# Patient Record
Sex: FEMALE | Race: WHITE | Employment: OTHER | URBAN - METROPOLITAN AREA
[De-identification: names, ages, dates, MRNs, and addresses within clinical notes are randomized per-mention and may not be internally consistent; named-entity substitution may affect disease eponyms.]

---

## 2017-01-29 ENCOUNTER — HOSPITAL ENCOUNTER (EMERGENCY)
Age: 52
Discharge: HOME | End: 2017-01-29
Attending: EMERGENCY MEDICINE
Payer: MEDICARE

## 2017-01-29 ENCOUNTER — APPOINTMENT (OUTPATIENT)
Dept: GENERAL RADIOLOGY | Age: 52
End: 2017-01-29
Attending: NURSE PRACTITIONER
Payer: MEDICARE

## 2017-01-29 VITALS
TEMPERATURE: 98.5 F | HEART RATE: 95 BPM | DIASTOLIC BLOOD PRESSURE: 87 MMHG | RESPIRATION RATE: 16 BRPM | WEIGHT: 230 LBS | OXYGEN SATURATION: 93 % | BODY MASS INDEX: 33.97 KG/M2 | SYSTOLIC BLOOD PRESSURE: 155 MMHG

## 2017-01-29 DIAGNOSIS — J20.9 ACUTE BRONCHITIS, UNSPECIFIED ORGANISM: Primary | ICD-10-CM

## 2017-01-29 LAB
ALBUMIN SERPL BCP-MCNC: 4.6 G/DL (ref 3.5–5)
ALBUMIN/GLOB SERPL: 1 {RATIO} (ref 1.2–3.5)
ALP SERPL-CCNC: 50 U/L (ref 50–136)
ALT SERPL-CCNC: 67 U/L (ref 12–65)
AMPHET UR QL SCN: NEGATIVE
ANION GAP BLD CALC-SCNC: 9 MMOL/L (ref 7–16)
AST SERPL W P-5'-P-CCNC: 46 U/L (ref 15–37)
BARBITURATES UR QL SCN: NEGATIVE
BASOPHILS # BLD AUTO: 0 K/UL (ref 0–0.2)
BASOPHILS # BLD: 0 % (ref 0–2)
BENZODIAZ UR QL: NEGATIVE
BILIRUB SERPL-MCNC: 0.4 MG/DL (ref 0.2–1.1)
BNP SERPL-MCNC: 25 PG/ML
BUN SERPL-MCNC: 8 MG/DL (ref 6–23)
CALCIUM SERPL-MCNC: 9.5 MG/DL (ref 8.3–10.4)
CANNABINOIDS UR QL SCN: NEGATIVE
CHLORIDE SERPL-SCNC: 98 MMOL/L (ref 98–107)
CO2 SERPL-SCNC: 30 MMOL/L (ref 21–32)
COCAINE UR QL SCN: NEGATIVE
CREAT SERPL-MCNC: 0.85 MG/DL (ref 0.6–1)
CRP SERPL-MCNC: 3.5 MG/DL (ref 0–0.9)
DIFFERENTIAL METHOD BLD: ABNORMAL
EOSINOPHIL # BLD: 0.1 K/UL (ref 0–0.8)
EOSINOPHIL NFR BLD: 1 % (ref 0.5–7.8)
ERYTHROCYTE [DISTWIDTH] IN BLOOD BY AUTOMATED COUNT: 13.4 % (ref 11.9–14.6)
GLOBULIN SER CALC-MCNC: 4.6 G/DL (ref 2.3–3.5)
GLUCOSE SERPL-MCNC: 95 MG/DL (ref 65–100)
HCG UR QL: NEGATIVE
HCT VFR BLD AUTO: 44.3 % (ref 35.8–46.3)
HGB BLD-MCNC: 14.8 G/DL (ref 11.7–15.4)
IMM GRANULOCYTES # BLD: 0.1 K/UL (ref 0–0.5)
IMM GRANULOCYTES NFR BLD AUTO: 0.3 % (ref 0–5)
LYMPHOCYTES # BLD AUTO: 12 % (ref 13–44)
LYMPHOCYTES # BLD: 2.3 K/UL (ref 0.5–4.6)
MCH RBC QN AUTO: 29.8 PG (ref 26.1–32.9)
MCHC RBC AUTO-ENTMCNC: 33.4 G/DL (ref 31.4–35)
MCV RBC AUTO: 89.3 FL (ref 79.6–97.8)
METHADONE UR QL: NEGATIVE
MONOCYTES # BLD: 1 K/UL (ref 0.1–1.3)
MONOCYTES NFR BLD AUTO: 5 % (ref 4–12)
NEUTS SEG # BLD: 15.3 K/UL (ref 1.7–8.2)
NEUTS SEG NFR BLD AUTO: 82 % (ref 43–78)
OPIATES UR QL: POSITIVE
PCP UR QL: NEGATIVE
PLATELET # BLD AUTO: 289 K/UL (ref 150–450)
PMV BLD AUTO: 11.1 FL (ref 10.8–14.1)
POTASSIUM SERPL-SCNC: 4.2 MMOL/L (ref 3.5–5.1)
PROT SERPL-MCNC: 9.2 G/DL (ref 6.3–8.2)
RBC # BLD AUTO: 4.96 M/UL (ref 4.05–5.25)
SODIUM SERPL-SCNC: 137 MMOL/L (ref 136–145)
TROPONIN I SERPL-MCNC: <0.04 NG/ML (ref 0.02–0.05)
WBC # BLD AUTO: 18.8 K/UL (ref 4.3–11.1)

## 2017-01-29 PROCEDURE — 86140 C-REACTIVE PROTEIN: CPT | Performed by: NURSE PRACTITIONER

## 2017-01-29 PROCEDURE — 96361 HYDRATE IV INFUSION ADD-ON: CPT | Performed by: NURSE PRACTITIONER

## 2017-01-29 PROCEDURE — 71020 XR CHEST PA LAT: CPT

## 2017-01-29 PROCEDURE — 96374 THER/PROPH/DIAG INJ IV PUSH: CPT | Performed by: NURSE PRACTITIONER

## 2017-01-29 PROCEDURE — 80053 COMPREHEN METABOLIC PANEL: CPT | Performed by: NURSE PRACTITIONER

## 2017-01-29 PROCEDURE — 77030020120 HC VLV RESP PEP HI -B

## 2017-01-29 PROCEDURE — 80307 DRUG TEST PRSMV CHEM ANLYZR: CPT | Performed by: NURSE PRACTITIONER

## 2017-01-29 PROCEDURE — 99285 EMERGENCY DEPT VISIT HI MDM: CPT | Performed by: NURSE PRACTITIONER

## 2017-01-29 PROCEDURE — 81025 URINE PREGNANCY TEST: CPT

## 2017-01-29 PROCEDURE — 93005 ELECTROCARDIOGRAM TRACING: CPT | Performed by: NURSE PRACTITIONER

## 2017-01-29 PROCEDURE — 94640 AIRWAY INHALATION TREATMENT: CPT

## 2017-01-29 PROCEDURE — 74011250636 HC RX REV CODE- 250/636: Performed by: NURSE PRACTITIONER

## 2017-01-29 PROCEDURE — 84484 ASSAY OF TROPONIN QUANT: CPT | Performed by: NURSE PRACTITIONER

## 2017-01-29 PROCEDURE — 83880 ASSAY OF NATRIURETIC PEPTIDE: CPT | Performed by: NURSE PRACTITIONER

## 2017-01-29 PROCEDURE — 81003 URINALYSIS AUTO W/O SCOPE: CPT | Performed by: NURSE PRACTITIONER

## 2017-01-29 PROCEDURE — 74011000250 HC RX REV CODE- 250: Performed by: NURSE PRACTITIONER

## 2017-01-29 PROCEDURE — 74011250637 HC RX REV CODE- 250/637: Performed by: NURSE PRACTITIONER

## 2017-01-29 PROCEDURE — 94664 DEMO&/EVAL PT USE INHALER: CPT

## 2017-01-29 PROCEDURE — 85025 COMPLETE CBC W/AUTO DIFF WBC: CPT | Performed by: NURSE PRACTITIONER

## 2017-01-29 RX ORDER — HYDROCODONE BITARTRATE AND ACETAMINOPHEN 5; 325 MG/1; MG/1
1 TABLET ORAL ONCE
Status: COMPLETED | OUTPATIENT
Start: 2017-01-29 | End: 2017-01-29

## 2017-01-29 RX ORDER — DOXYCYCLINE HYCLATE 100 MG
100 TABLET ORAL 2 TIMES DAILY
Qty: 14 TAB | Refills: 0 | Status: SHIPPED | OUTPATIENT
Start: 2017-01-29 | End: 2017-02-05

## 2017-01-29 RX ORDER — IPRATROPIUM BROMIDE AND ALBUTEROL SULFATE 2.5; .5 MG/3ML; MG/3ML
3 SOLUTION RESPIRATORY (INHALATION)
Status: COMPLETED | OUTPATIENT
Start: 2017-01-29 | End: 2017-01-29

## 2017-01-29 RX ORDER — GUAIFENESIN 600 MG/1
600 TABLET, EXTENDED RELEASE ORAL 2 TIMES DAILY
Qty: 30 TAB | Refills: 0 | Status: SHIPPED | OUTPATIENT
Start: 2017-01-29 | End: 2017-05-23

## 2017-01-29 RX ORDER — ALBUTEROL SULFATE 90 UG/1
2 AEROSOL, METERED RESPIRATORY (INHALATION)
Qty: 1 INHALER | Refills: 0 | Status: SHIPPED | OUTPATIENT
Start: 2017-01-29 | End: 2018-08-29 | Stop reason: SDUPTHER

## 2017-01-29 RX ORDER — BUDESONIDE AND FORMOTEROL FUMARATE DIHYDRATE 160; 4.5 UG/1; UG/1
2 AEROSOL RESPIRATORY (INHALATION) 2 TIMES DAILY
Qty: 1 INHALER | Refills: 0 | Status: SHIPPED | OUTPATIENT
Start: 2017-01-29

## 2017-01-29 RX ORDER — PREDNISONE 20 MG/1
20 TABLET ORAL DAILY
Qty: 7 TAB | Refills: 0 | Status: SHIPPED | OUTPATIENT
Start: 2017-01-29 | End: 2017-02-05

## 2017-01-29 RX ORDER — SODIUM CHLORIDE 9 MG/ML
125 INJECTION, SOLUTION INTRAVENOUS CONTINUOUS
Status: DISCONTINUED | OUTPATIENT
Start: 2017-01-29 | End: 2017-01-29 | Stop reason: HOSPADM

## 2017-01-29 RX ORDER — PREDNISONE 50 MG/1
50 TABLET ORAL DAILY
Qty: 3 TAB | Refills: 0 | Status: SHIPPED | OUTPATIENT
Start: 2017-01-29 | End: 2017-02-01

## 2017-01-29 RX ADMIN — HYDROCODONE BITARTRATE AND ACETAMINOPHEN 1 TABLET: 5; 325 TABLET ORAL at 18:40

## 2017-01-29 RX ADMIN — SODIUM CHLORIDE 125 ML/HR: 900 INJECTION, SOLUTION INTRAVENOUS at 17:22

## 2017-01-29 RX ADMIN — IPRATROPIUM BROMIDE AND ALBUTEROL SULFATE 3 ML: 2.5; .5 SOLUTION RESPIRATORY (INHALATION) at 17:20

## 2017-01-29 RX ADMIN — METHYLPREDNISOLONE SODIUM SUCCINATE 125 MG: 125 INJECTION, POWDER, FOR SOLUTION INTRAMUSCULAR; INTRAVENOUS at 17:21

## 2017-01-29 RX ADMIN — IPRATROPIUM BROMIDE AND ALBUTEROL SULFATE 3 ML: 2.5; .5 SOLUTION RESPIRATORY (INHALATION) at 18:30

## 2017-01-29 NOTE — ED TRIAGE NOTES
Pt reports cough x 3-4 days. Pt reports attempting robitussin. Pt reports feeling drowsy and having fever.     Naina Stevens RN

## 2017-01-29 NOTE — LETTER
[address] [facility] EMERGENCY DEPT 
[facility] [address] [place] [id number] 
[phone] Work/School Note Date: 1/29/2017 To Whom It May concern: 
 
Kathe Morrell was seen and treated today in the emergency room by the following provider(s): 
Attending Provider: Catarino Santiago MD 
Nurse Practitioner: Bren Birmingham NP. Kathe Morrell may return to school/work on Thursday. Sincerely, Bren Birmingham NP

## 2017-01-29 NOTE — ED PROVIDER NOTES
HPI Comments: 53 y/o f w hsx cad s/p mi and cabg, copd, ckd, ferd, depression and recent pna about one month ago to ed with complaint of sob, cough, fever and chills, malaise. Appears to not feel well, with wheezing and rhonchi audible. Sat 98% room air    Patient is a 54 y.o. female presenting with cough. The history is provided by the patient. No  was used. Cough   This is a new problem. Episode onset: 3-4 days ago. The problem occurs constantly. The problem has not changed since onset. The cough is non-productive. Patient reports a subjective fever - was not measured. Associated symptoms include chills, eye redness, headaches, rhinorrhea, sore throat, myalgias, shortness of breath and wheezing. Pertinent negatives include no chest pain, no sweats, no weight loss, no ear congestion, no ear pain, no nausea, no vomiting and no confusion. She is a smoker. Her past medical history is significant for pneumonia and COPD. Past Medical History:   Diagnosis Date    Arthritis     CAD (coronary artery disease) CABG 2009     stented x 1 spring of 2015    Chronic back pain states cannot lie flat due to back pain    Chronic kidney disease     Chronic obstructive pulmonary disease (HCC)     Chronic pain     Coagulation defect ([facility] [address]      effient- did not hold prior to procedure . office and Dr. [address] aware    Depression     GERD (gastroesophageal reflux disease)     Hypercholesterolemia     Hypertension     Joint ache     Memory impairment     MI, old x 2     2009    Obesity (BMI 30-39.9)      36.6    Persistent insomnia      S/P CABG x 4 2009    Smoker      36 yr hx/ 1 pk per day.      Stented coronary artery      EF 55-60% echo 12/15       Past Surgical History:   Procedure Laterality Date    Hx cholecystectomy      Hx coronary stent placement  2015     x1 spring    Hx coronary artery bypass graft  2009     x 4 grafts    Hx other surgical  2010     renal stents     Hx  section       x3    Hx hysterectomy      Hx salpingo-oophorectomy      Hx orthopaedic  06/24     left knee arthroscopy          Family History:   Problem Relation Age of Onset    Diabetes Mother     Heart Disease Mother     Kidney Disease Mother     Lung Disease Mother     Diabetes Father     Heart Disease Father     Kidney Disease Father     Lung Disease Father        Social History     Social History    Marital status: SINGLE     Spouse name: N/A    Number of children: N/A    Years of education: N/A     Occupational History    Not on file. Social History Main Topics    Smoking status: Former Smoker     Quit date: 10/9/2016    Smokeless tobacco: Never Used    Alcohol use No      Comment:      Drug use: No    Sexual activity: Not on file     Other Topics Concern    Not on file     Social History Narrative         ALLERGIES: Review of patient's allergies indicates no known allergies. Review of Systems   Constitutional: Positive for chills, fatigue and fever. Negative for weight loss. HENT: Positive for rhinorrhea and sore throat. Negative for ear pain and postnasal drip. Eyes: Positive for redness. Negative for itching. Respiratory: Positive for cough, shortness of breath and wheezing. Cardiovascular: Negative for chest pain and palpitations. Gastrointestinal: Negative for abdominal pain, nausea and vomiting. Endocrine: Negative for cold intolerance and heat intolerance. Genitourinary: Negative for difficulty urinating and dysuria. Musculoskeletal: Positive for myalgias. Negative for back pain and neck pain. Skin: Negative for pallor and rash. Neurological: Positive for headaches. Negative for dizziness and facial asymmetry. Psychiatric/Behavioral: Negative for confusion and decreased concentration.        Vitals:    01/29/17 1533   BP: 165/90   Pulse: 98   Resp: 16   Temp: 98.5 °F (36.9 °C)   SpO2: 98%   Weight: 104.3 kg (230 lb)            Physical Exam Constitutional: She is oriented to person, place, and time. She appears well-developed and well-nourished. No distress. HENT:   Head: Normocephalic and atraumatic. Right Ear: External ear normal.   Left Ear: External ear normal.   Nose: Nose normal.   Eyes: Conjunctivae and EOM are normal. Pupils are equal, round, and reactive to light. Neck: Normal range of motion. Neck supple. Cardiovascular: Normal rate, regular rhythm and normal heart sounds. Pulmonary/Chest: Effort normal. No respiratory distress. She has wheezes. She has rales. Abdominal: Soft. Bowel sounds are normal. She exhibits no distension. There is no tenderness. Musculoskeletal: Normal range of motion. She exhibits no edema or tenderness. Neurological: She is alert and oriented to person, place, and time. No cranial nerve deficit. Coordination normal.   Pt was sound asleep when i first approached her, and confused for a few minutes after. After reorientation she became alert and oriented. Skin: Skin is warm and dry. No rash noted. Psychiatric: She has a normal mood and affect. Her behavior is normal. Judgment and thought content normal.   Nursing note and vitals reviewed. MDM  Number of Diagnoses or Management Options  Acute bronchitis, unspecified organism:   Diagnosis management comments: 53 y/o f to ed with cough, congestion, fever and chills w mult medical problems. Will eval cxr labs and provide inhaled bd, ck bnp as well. Oral steroids  6:54 PM update- pt has had two nebs and is improved. Still w wheezing and rhonchi on right but much much less pronounced. She says she is feeling better but tired from cough over last month. cxr is neg. Wbc is elevated at 18 - she tells me no steroids in last month. crp slightly above normal at 3.5. Trop and ekg neg.  uds neg except opiates which she reports. Urine dip is neg.    Pt does not want to be admitted and as she is feeling better and has a family md for follow up i think she is safe for dc. Will send home with inhalers, steroids, antibiotics and mucolytics to return to ED for any worsening of condition. She and  agree.        Amount and/or Complexity of Data Reviewed  Clinical lab tests: ordered and reviewed  Tests in the radiology section of CPT®: ordered and reviewed  Discuss the patient with other providers: yes (juan  )    Risk of Complications, Morbidity, and/or Mortality  Presenting problems: minimal  Diagnostic procedures: minimal  Management options: low      ED Course       Procedures

## 2017-01-29 NOTE — ED NOTES
Took report from [address] [place] [address], [address] [place] [address]. Gave med as ordered.

## 2017-01-30 LAB
ATRIAL RATE: 89 BPM
CALCULATED P AXIS, ECG09: 77 DEGREES
CALCULATED R AXIS, ECG10: 50 DEGREES
CALCULATED T AXIS, ECG11: 103 DEGREES
DIAGNOSIS, 93000: NORMAL
DIASTOLIC BP, ECG02: NORMAL MMHG
P-R INTERVAL, ECG05: 140 MS
Q-T INTERVAL, ECG07: 328 MS
QRS DURATION, ECG06: 90 MS
QTC CALCULATION (BEZET), ECG08: 399 MS
SYSTOLIC BP, ECG01: NORMAL MMHG
VENTRICULAR RATE, ECG03: 89 BPM

## 2017-01-30 NOTE — DISCHARGE INSTRUCTIONS
Bronchitis: Care Instructions  Your Care Instructions    Bronchitis is inflammation of the bronchial tubes, which carry air to the lungs. The tubes swell and produce mucus, or phlegm. The mucus and inflamed bronchial tubes make you cough. You may have trouble breathing. Most cases of bronchitis are caused by viruses like those that cause colds. Antibiotics usually do not help and they may be harmful. Bronchitis usually develops rapidly and lasts about 2 to 3 weeks in otherwise healthy people. Follow-up care is a key part of your treatment and safety. Be sure to make and go to all appointments, and call your doctor if you are having problems. It's also a good idea to know your test results and keep a list of the medicines you take. How can you care for yourself at home? · Take all medicines exactly as prescribed. Call your doctor if you think you are having a problem with your medicine. · Get some extra rest.  · Take an over-the-counter pain medicine, such as acetaminophen (Tylenol), ibuprofen (Advil, Motrin), or naproxen (Aleve) to reduce fever and relieve body aches. Read and follow all instructions on the label. · Do not take two or more pain medicines at the same time unless the doctor told you to. Many pain medicines have acetaminophen, which is Tylenol. Too much acetaminophen (Tylenol) can be harmful. · Take an over-the-counter cough medicine that contains dextromethorphan to help quiet a dry, hacking cough so that you can sleep. Avoid cough medicines that have more than one active ingredient. Read and follow all instructions on the label. · Breathe moist air from a humidifier, hot shower, or sink filled with hot water. The heat and moisture will thin mucus so you can cough it out. · Do not smoke. Smoking can make bronchitis worse. If you need help quitting, talk to your doctor about stop-smoking programs and medicines. These can increase your chances of quitting for good.   When should you call for help? Call 911 anytime you think you may need emergency care. For example, call if:  · You have severe trouble breathing. Call your doctor now or seek immediate medical care if:  · You have new or worse trouble breathing. · You cough up dark brown or bloody mucus (sputum). · You have a new or higher fever. · You have a new rash. Watch closely for changes in your health, and be sure to contact your doctor if:  · You cough more deeply or more often, especially if you notice more mucus or a change in the color of your mucus. · You are not getting better as expected. Where can you learn more? Go to http://mitchell-karen.info/. Enter H333 in the search box to learn more about \"Bronchitis: Care Instructions. \"  Current as of: May 23, 2016  Content Version: 11.1  © 7245-0909 [employer]. Care instructions adapted under license by [employer] (which disclaims liability or warranty for this information). If you have questions about a medical condition or this instruction, always ask your healthcare professional. [address] any warranty or liability for your use of this information. Learning About COPD and How to Prevent Lung Infections  How do lung infections affect COPD? Lung infections like pneumonia and acute bronchitis are common causes of COPD flare-ups. And people who have COPD are more likely to get these lung infections, especially if they smoke. When you have COPD, it is important to know the symptoms of pneumonia and acute bronchitis and call your doctor if you have them. Symptoms include:  · A cough that brings up more mucus than usual.  · Fever. · Shortness of breath. What can you do to prevent these infections? Stay healthy  · Get a flu shot every year. · Get a pneumococcal vaccine shot. If you have had one before, ask your doctor whether you need another dose.  Two different types of pneumococcal vaccines are recommended for people ages 72 and older. · If you must be around people with colds or the flu, wash your hands often. · Do not smoke. This is the most important step you can take to prevent more damage to your lungs. If you need help quitting, talk to your doctor about stop-smoking programs and medicines. These can increase your chances of quitting for good. · Avoid secondhand smoke, air pollution, and high altitudes. Also avoid cold, dry air and hot, humid air. Stay at home with your windows closed when air pollution is bad. Exercise and eat well  · If your doctor recommends it, get more exercise. Walking is a good choice. Bit by bit, increase the amount you walk every day. Try for at least 30 minutes on most days of the week. · Eat regular, well-balanced meals. Eating right keeps your energy levels up and helps your body fight infection. · Get plenty of rest and sleep. Follow-up care is a key part of your treatment and safety. Be sure to make and go to all appointments, and call your doctor if you are having problems. It's also a good idea to know your test results and keep a list of the medicines you take. Where can you learn more? Go to http://mitchell-karen.info/. Enter L709 in the search box to learn more about \"Learning About COPD and How to Prevent Lung Infections. \"  Current as of: May 23, 2016  Content Version: 11.1  © 0748-0032 [employer]. Care instructions adapted under license by [employer] (which disclaims liability or warranty for this information). If you have questions about a medical condition or this instruction, always ask your healthcare professional. [address] any warranty or liability for your use of this information.

## 2017-01-31 ENCOUNTER — HOSPITAL ENCOUNTER (EMERGENCY)
Age: 52
Discharge: HOME | End: 2017-01-31
Attending: EMERGENCY MEDICINE
Payer: MEDICARE

## 2017-01-31 ENCOUNTER — APPOINTMENT (OUTPATIENT)
Dept: GENERAL RADIOLOGY | Age: 52
End: 2017-01-31
Attending: EMERGENCY MEDICINE
Payer: MEDICARE

## 2017-01-31 VITALS
DIASTOLIC BLOOD PRESSURE: 74 MMHG | SYSTOLIC BLOOD PRESSURE: 145 MMHG | HEART RATE: 78 BPM | TEMPERATURE: 98 F | BODY MASS INDEX: 36.88 KG/M2 | RESPIRATION RATE: 16 BRPM | OXYGEN SATURATION: 98 % | WEIGHT: 235 LBS | HEIGHT: 67 IN

## 2017-01-31 DIAGNOSIS — E86.0 DEHYDRATION: ICD-10-CM

## 2017-01-31 DIAGNOSIS — R50.9 ACUTE FEBRILE ILLNESS: Primary | ICD-10-CM

## 2017-01-31 LAB
ANION GAP BLD CALC-SCNC: 5 MMOL/L (ref 7–16)
BASOPHILS # BLD AUTO: 0 K/UL (ref 0–0.2)
BASOPHILS # BLD: 0 % (ref 0–2)
BUN SERPL-MCNC: 11 MG/DL (ref 6–23)
CALCIUM SERPL-MCNC: 8.7 MG/DL (ref 8.3–10.4)
CHLORIDE SERPL-SCNC: 105 MMOL/L (ref 98–107)
CO2 SERPL-SCNC: 30 MMOL/L (ref 21–32)
CREAT SERPL-MCNC: 0.78 MG/DL (ref 0.6–1)
DIFFERENTIAL METHOD BLD: ABNORMAL
EOSINOPHIL # BLD: 0 K/UL (ref 0–0.8)
EOSINOPHIL NFR BLD: 0 % (ref 0.5–7.8)
ERYTHROCYTE [DISTWIDTH] IN BLOOD BY AUTOMATED COUNT: 13.7 % (ref 11.9–14.6)
GLUCOSE SERPL-MCNC: 81 MG/DL (ref 65–100)
HCT VFR BLD AUTO: 38.1 % (ref 35.8–46.3)
HGB BLD-MCNC: 12.6 G/DL (ref 11.7–15.4)
IMM GRANULOCYTES # BLD: 0.2 K/UL (ref 0–0.5)
IMM GRANULOCYTES NFR BLD AUTO: 0.6 % (ref 0–5)
LACTATE BLD-SCNC: 1.9 MMOL/L (ref 0.5–1.9)
LYMPHOCYTES # BLD AUTO: 19 % (ref 13–44)
LYMPHOCYTES # BLD: 4.4 K/UL (ref 0.5–4.6)
MCH RBC QN AUTO: 29.6 PG (ref 26.1–32.9)
MCHC RBC AUTO-ENTMCNC: 33.1 G/DL (ref 31.4–35)
MCV RBC AUTO: 89.4 FL (ref 79.6–97.8)
MONOCYTES # BLD: 0.9 K/UL (ref 0.1–1.3)
MONOCYTES NFR BLD AUTO: 4 % (ref 4–12)
NEUTS SEG # BLD: 18.1 K/UL (ref 1.7–8.2)
NEUTS SEG NFR BLD AUTO: 76 % (ref 43–78)
PLATELET # BLD AUTO: 273 K/UL (ref 150–450)
PMV BLD AUTO: 10.7 FL (ref 10.8–14.1)
POTASSIUM SERPL-SCNC: 4.2 MMOL/L (ref 3.5–5.1)
PROCALCITONIN SERPL-MCNC: <0.1 NG/ML
RBC # BLD AUTO: 4.26 M/UL (ref 4.05–5.25)
SODIUM SERPL-SCNC: 140 MMOL/L (ref 136–145)
WBC # BLD AUTO: 23.6 K/UL (ref 4.3–11.1)

## 2017-01-31 PROCEDURE — 71010 XR CHEST PORT: CPT

## 2017-01-31 PROCEDURE — 83605 ASSAY OF LACTIC ACID: CPT

## 2017-01-31 PROCEDURE — 74011000250 HC RX REV CODE- 250: Performed by: EMERGENCY MEDICINE

## 2017-01-31 PROCEDURE — 80048 BASIC METABOLIC PNL TOTAL CA: CPT | Performed by: EMERGENCY MEDICINE

## 2017-01-31 PROCEDURE — 84145 PROCALCITONIN (PCT): CPT | Performed by: EMERGENCY MEDICINE

## 2017-01-31 PROCEDURE — 99284 EMERGENCY DEPT VISIT MOD MDM: CPT | Performed by: EMERGENCY MEDICINE

## 2017-01-31 PROCEDURE — 94644 CONT INHLJ TX 1ST HOUR: CPT

## 2017-01-31 PROCEDURE — 74011000258 HC RX REV CODE- 258: Performed by: EMERGENCY MEDICINE

## 2017-01-31 PROCEDURE — 74011250636 HC RX REV CODE- 250/636: Performed by: EMERGENCY MEDICINE

## 2017-01-31 PROCEDURE — 96361 HYDRATE IV INFUSION ADD-ON: CPT | Performed by: EMERGENCY MEDICINE

## 2017-01-31 PROCEDURE — 74011250637 HC RX REV CODE- 250/637: Performed by: EMERGENCY MEDICINE

## 2017-01-31 PROCEDURE — 85025 COMPLETE CBC W/AUTO DIFF WBC: CPT | Performed by: EMERGENCY MEDICINE

## 2017-01-31 PROCEDURE — 96365 THER/PROPH/DIAG IV INF INIT: CPT | Performed by: EMERGENCY MEDICINE

## 2017-01-31 RX ORDER — DOXYCYCLINE 100 MG/1
100 CAPSULE ORAL
Status: COMPLETED | OUTPATIENT
Start: 2017-01-31 | End: 2017-01-31

## 2017-01-31 RX ORDER — SODIUM CHLORIDE 9 MG/ML
1000 INJECTION, SOLUTION INTRAVENOUS ONCE
Status: COMPLETED | OUTPATIENT
Start: 2017-01-31 | End: 2017-01-31

## 2017-01-31 RX ORDER — ALBUTEROL SULFATE 0.83 MG/ML
10 SOLUTION RESPIRATORY (INHALATION)
Status: COMPLETED | OUTPATIENT
Start: 2017-01-31 | End: 2017-01-31

## 2017-01-31 RX ORDER — HYDROCODONE BITARTRATE AND ACETAMINOPHEN 7.5; 325 MG/15ML; MG/15ML
15 SOLUTION ORAL
Qty: 105 ML | Refills: 0 | Status: SHIPPED | OUTPATIENT
Start: 2017-01-31 | End: 2017-02-01

## 2017-01-31 RX ADMIN — ALBUTEROL SULFATE 10 MG: 2.5 SOLUTION RESPIRATORY (INHALATION) at 09:29

## 2017-01-31 RX ADMIN — CEFTRIAXONE 1 G: 1 INJECTION, POWDER, FOR SOLUTION INTRAMUSCULAR; INTRAVENOUS at 10:59

## 2017-01-31 RX ADMIN — DOXYCYCLINE HYCLATE 100 MG: 100 CAPSULE ORAL at 10:59

## 2017-01-31 RX ADMIN — SODIUM CHLORIDE 1000 ML: 900 INJECTION, SOLUTION INTRAVENOUS at 09:06

## 2017-01-31 NOTE — LETTER
[address] [facility] EMERGENCY DEPT 
[facility] [address] [place] [id number] 
[phone] Work/School Note Date: 1/31/2017 To Whom It May concern: 
 
Albino Huff was seen and treated today in the emergency room by the following provider(s): 
Attending Provider: Heri De Santiago MD. Albino Huff may return to school on 02-06-17.  
 
Sincerely, 
 
 
 
 
Kwasi Ledesma RN

## 2017-01-31 NOTE — ED PROVIDER NOTES
HPI Comments: 82-year-old female seen here several days ago for cough and not feeling well. White count 18,000 at that time. Was given steroids and inhalers and antibiotics and discharged home    She comes in today complaining of persistent fatigue. Lack of energy. Increased sleepiness. Persistent cough. Fevers at home last night to 102    No alleviating. She tried over-the-counter medicines without success. She is taking her medications as directed    She gets worse with activity and exertion    No vomiting. No diarrhea. No abdominal pain        Patient is a 54 y.o. female presenting with cough and fatigue. Cough   Associated symptoms include chills, sore throat and wheezing. Fatigue          Past Medical History:   Diagnosis Date    Arthritis     CAD (coronary artery disease) CABG 2009     stented x 1 spring of 2015    Chronic back pain states cannot lie flat due to back pain    Chronic kidney disease     Chronic obstructive pulmonary disease (HCC)     Chronic pain     Coagulation defect ([address]      effient- did not hold prior to procedure . office and Dr. Karl Mcdonald aware    Depression     GERD (gastroesophageal reflux disease)     Hypercholesterolemia     Hypertension     Joint ache     Memory impairment     MI, old x 2     2009    Obesity (BMI 30-39.9)      36.6    Persistent insomnia      S/P CABG x 4 2009    Smoker      36 yr hx/ 1 pk per day.      Stented coronary artery      EF 55-60% echo 12/15       Past Surgical History:   Procedure Laterality Date    Hx cholecystectomy      Hx coronary stent placement  2015     x1 spring    Hx coronary artery bypass graft  2009     x 4 grafts    Hx other surgical  2010     renal stents     Hx  section       x3    Hx hysterectomy      Hx salpingo-oophorectomy      Hx orthopaedic       left knee arthroscopy          Family History:   Problem Relation Age of Onset    Diabetes Mother     Heart Disease Mother     Kidney Disease Mother     Lung Disease Mother     Diabetes Father     Heart Disease Father     Kidney Disease Father     Lung Disease Father        Social History     Social History    Marital status: SINGLE     Spouse name: N/A    Number of children: N/A    Years of education: N/A     Occupational History    Not on file. Social History Main Topics    Smoking status: Former Smoker     Quit date: 10/9/2016    Smokeless tobacco: Never Used    Alcohol use No      Comment:      Drug use: No    Sexual activity: Not on file     Other Topics Concern    Not on file     Social History Narrative         ALLERGIES: Review of patient's allergies indicates no known allergies. Review of Systems   Constitutional: Positive for chills, fatigue and fever. HENT: Positive for congestion and sore throat. Eyes: Negative. Respiratory: Positive for cough and wheezing. Cardiovascular: Negative. Gastrointestinal: Negative. Genitourinary: Negative. Musculoskeletal: Negative. Neurological: Negative. Psychiatric/Behavioral: Negative. Vitals:    01/31/17 0756   BP: 164/87   Pulse: 92   Resp: 18   Temp: 98.7 °F (37.1 °C)   SpO2: 97%   Weight: 106.6 kg (235 lb)   Height: 5' 7\" (1.702 m)            Physical Exam   Constitutional: She is oriented to person, place, and time. She appears well-developed and well-nourished. No distress. HENT:   Head: Normocephalic and atraumatic. Mouth/Throat: Oropharynx is clear and moist. No oropharyngeal exudate. Eyes: EOM are normal. Pupils are equal, round, and reactive to light. No scleral icterus. Neck: Neck supple. Cardiovascular: Normal rate and regular rhythm. Pulmonary/Chest: She has wheezes. She has no rales. Abdominal: Soft. She exhibits no distension. There is no tenderness. There is no rebound. Musculoskeletal: She exhibits no edema or tenderness. Lymphadenopathy:     She has no cervical adenopathy.    Neurological: She is alert and oriented to person, place, and time. Skin: Skin is warm and dry. Psychiatric: She has a normal mood and affect. Her behavior is normal.   Nursing note and vitals reviewed. MDM  Number of Diagnoses or Management Options  Diagnosis management comments: 72-year-old female recent respiratory infection. Presents with persistent cough. Fatigue. Fever    No alleviating    I have ordered labs. I have ordered a respiratory treatment. I have ordered an x-ray. We'll continue to monitor. Reassess the patient.   Likely discharge home  Tierra Thompson MD; 1/31/2017 @9:26 AM  ===================================================================         Amount and/or Complexity of Data Reviewed  Clinical lab tests: ordered  Tests in the radiology section of CPT®: ordered  Review and summarize past medical records: yes    Risk of Complications, Morbidity, and/or Mortality  Presenting problems: moderate  Diagnostic procedures: minimal  Management options: moderate      ED Course   pt resting  Appears improved  Lactate normal    CXR - no infiltrate  WBC has increased slightly   Suspect viral process-- possibly influenza    Will dc home    Procedures

## 2017-01-31 NOTE — DISCHARGE INSTRUCTIONS
Influenza (Flu): Care Instructions  Your Care Instructions  Influenza (flu) is an infection in the lungs and breathing passages. It is caused by the influenza virus. There are different strains, or types, of the flu virus from year to year. Unlike the common cold, the flu comes on suddenly and the symptoms, such as a cough, congestion, fever, chills, fatigue, aches, and pains, are more severe. These symptoms may last up to 10 days. Although the flu can make you feel very sick, it usually doesn't cause serious health problems. Home treatment is usually all you need for flu symptoms. But your doctor may prescribe antiviral medicine to prevent other health problems, such as pneumonia, from developing. Older people and those who have a long-term health condition, such as lung disease, are most at risk for having pneumonia or other health problems. Follow-up care is a key part of your treatment and safety. Be sure to make and go to all appointments, and call your doctor if you are having problems. Its also a good idea to know your test results and keep a list of the medicines you take. How can you care for yourself at home? · Get plenty of rest.  · Drink plenty of fluids, enough so that your urine is light yellow or clear like water. If you have kidney, heart, or liver disease and have to limit fluids, talk with your doctor before you increase the amount of fluids you drink. · Take an over-the-counter pain medicine if needed, such as acetaminophen (Tylenol), ibuprofen (Advil, Motrin), or naproxen (Aleve), to relieve fever, headache, and muscle aches. Read and follow all instructions on the label. No one younger than 20 should take aspirin. It has been linked to Reye syndrome, a serious illness. · Do not smoke. Smoking can make the flu worse. If you need help quitting, talk to your doctor about stop-smoking programs and medicines. These can increase your chances of quitting for good.   · Breathe moist air from a hot shower or from a sink filled with hot water to help clear a stuffy nose. · Before you use cough and cold medicines, check the label. These medicines may not be safe for young children or for people with certain health problems. · If the skin around your nose and lips becomes sore, put some petroleum jelly on the area. · To ease coughing:  ¨ Drink fluids to soothe a scratchy throat. ¨ Suck on cough drops or plain hard candy. ¨ Take an over-the-counter cough medicine that contains dextromethorphan to help you get some sleep. Read and follow all instructions on the label. ¨ Raise your head at night with an extra pillow. This may help you rest if coughing keeps you awake. · Take any prescribed medicine exactly as directed. Call your doctor if you think you are having a problem with your medicine. To avoid spreading the flu  · Wash your hands regularly, and keep your hands away from your face. · Stay home from school, work, and other public places until you are feeling better and your fever has been gone for at least 24 hours. The fever needs to have gone away on its own without the help of medicine. · Ask people living with you to talk to their doctors about preventing the flu. They may get antiviral medicine to keep from getting the flu from you. · To prevent the flu in the future, get a flu vaccine every fall. Encourage people living with you to get the vaccine. · Cover your mouth when you cough or sneeze. When should you call for help? Call 911 anytime you think you may need emergency care. For example, call if:  · You have severe trouble breathing. Call your doctor now or seek immediate medical care if:  · You have new or worse trouble breathing. · You seem to be getting much sicker. · You feel very sleepy or confused. · You have a new or higher fever. · You get a new rash.   Watch closely for changes in your health, and be sure to contact your doctor if:  · You begin to get better and then get worse. · You are not getting better after 1 week. Where can you learn more? Go to http://mitchell-karen.info/. Enter V961 in the search box to learn more about \"Influenza (Flu): Care Instructions. \"  Current as of: May 23, 2016  Content Version: 11.1  © 8496-5612 [employer]. Care instructions adapted under license by [employer] (which disclaims liability or warranty for this information). If you have questions about a medical condition or this instruction, always ask your healthcare professional. [address] any warranty or liability for your use of this information.

## 2017-03-23 PROBLEM — R53.82 CHRONIC FATIGUE: Status: ACTIVE | Noted: 2017-03-23

## 2017-04-09 ENCOUNTER — HOSPITAL ENCOUNTER (EMERGENCY)
Age: 52
Discharge: HOME | End: 2017-04-09
Attending: EMERGENCY MEDICINE
Payer: MEDICARE

## 2017-04-09 ENCOUNTER — APPOINTMENT (OUTPATIENT)
Dept: GENERAL RADIOLOGY | Age: 52
End: 2017-04-09
Attending: NURSE PRACTITIONER
Payer: MEDICARE

## 2017-04-09 VITALS
WEIGHT: 239 LBS | HEART RATE: 82 BPM | BODY MASS INDEX: 37.51 KG/M2 | TEMPERATURE: 97.8 F | OXYGEN SATURATION: 97 % | DIASTOLIC BLOOD PRESSURE: 83 MMHG | HEIGHT: 67 IN | SYSTOLIC BLOOD PRESSURE: 160 MMHG | RESPIRATION RATE: 16 BRPM

## 2017-04-09 DIAGNOSIS — M54.42 BILATERAL LOW BACK PAIN WITH BILATERAL SCIATICA, UNSPECIFIED CHRONICITY: Primary | ICD-10-CM

## 2017-04-09 DIAGNOSIS — M54.41 BILATERAL LOW BACK PAIN WITH BILATERAL SCIATICA, UNSPECIFIED CHRONICITY: Primary | ICD-10-CM

## 2017-04-09 PROCEDURE — 81003 URINALYSIS AUTO W/O SCOPE: CPT | Performed by: NURSE PRACTITIONER

## 2017-04-09 PROCEDURE — 99284 EMERGENCY DEPT VISIT MOD MDM: CPT | Performed by: NURSE PRACTITIONER

## 2017-04-09 PROCEDURE — 74011250637 HC RX REV CODE- 250/637: Performed by: NURSE PRACTITIONER

## 2017-04-09 PROCEDURE — 96372 THER/PROPH/DIAG INJ SC/IM: CPT | Performed by: NURSE PRACTITIONER

## 2017-04-09 PROCEDURE — 72100 X-RAY EXAM L-S SPINE 2/3 VWS: CPT

## 2017-04-09 PROCEDURE — 72220 X-RAY EXAM SACRUM TAILBONE: CPT

## 2017-04-09 PROCEDURE — 74011250636 HC RX REV CODE- 250/636: Performed by: NURSE PRACTITIONER

## 2017-04-09 RX ORDER — LIDOCAINE 50 MG/G
1 PATCH TOPICAL EVERY 24 HOURS
Status: DISCONTINUED | OUTPATIENT
Start: 2017-04-09 | End: 2017-04-09 | Stop reason: HOSPADM

## 2017-04-09 RX ORDER — DIAZEPAM 2 MG/1
2 TABLET ORAL
Status: COMPLETED | OUTPATIENT
Start: 2017-04-09 | End: 2017-04-09

## 2017-04-09 RX ORDER — DEXAMETHASONE SODIUM PHOSPHATE 100 MG/10ML
10 INJECTION INTRAMUSCULAR; INTRAVENOUS
Status: COMPLETED | OUTPATIENT
Start: 2017-04-09 | End: 2017-04-09

## 2017-04-09 RX ORDER — CYCLOBENZAPRINE HCL 5 MG
10 TABLET ORAL
Qty: 30 TAB | Refills: 0 | Status: SHIPPED | OUTPATIENT
Start: 2017-04-09 | End: 2017-05-23

## 2017-04-09 RX ORDER — PREDNISONE 50 MG/1
50 TABLET ORAL DAILY
Qty: 3 TAB | Refills: 0 | Status: SHIPPED | OUTPATIENT
Start: 2017-04-09 | End: 2017-04-12

## 2017-04-09 RX ORDER — PREDNISONE 20 MG/1
20 TABLET ORAL DAILY
Qty: 7 TAB | Refills: 0 | Status: SHIPPED | OUTPATIENT
Start: 2017-04-09 | End: 2017-04-16

## 2017-04-09 RX ORDER — LIDOCAINE HCL 4 G/100G
CREAM TOPICAL
Qty: 1 TUBE | Refills: 0 | Status: SHIPPED | OUTPATIENT
Start: 2017-04-09 | End: 2017-05-23

## 2017-04-09 RX ORDER — HYDROCODONE BITARTRATE AND ACETAMINOPHEN 5; 325 MG/1; MG/1
1 TABLET ORAL ONCE
Status: COMPLETED | OUTPATIENT
Start: 2017-04-09 | End: 2017-04-09

## 2017-04-09 RX ADMIN — DIAZEPAM 2 MG: 2 TABLET ORAL at 11:30

## 2017-04-09 RX ADMIN — DEXAMETHASONE SODIUM PHOSPHATE 10 MG: 10 INJECTION INTRAMUSCULAR; INTRAVENOUS at 11:30

## 2017-04-09 RX ADMIN — HYDROCODONE BITARTRATE AND ACETAMINOPHEN 1 TABLET: 5; 325 TABLET ORAL at 11:30

## 2017-04-09 NOTE — ED TRIAGE NOTES
PMD-Dr Rene Ortiz. Pt c/o coccyx pain since yesterday around 2:30 pm. Denies any known injury. She states that prior to pain she experienced numbness/tingling to bilateral thighs that lasted about 2 hrs, Woke at 3 am with numbness to her bilateral feet that resolved about 45 minutes later. Denies any known injury. She also states that for about the last 6 months she has had urinary incontinence where she is unaware that she 'has urinated until it runs down her leg\".

## 2017-04-09 NOTE — DISCHARGE INSTRUCTIONS
Learning About Relief for Back Pain  What is back tension and strain? Back strain happens when you overstretch, or pull, a muscle in your back. You may hurt your back in an accident or when you exercise or lift something. Most back pain will get better with rest and time. You can take care of yourself at home to help your back heal.  What can you do first to relieve back pain? When you first feel back pain, try these steps:  · Walk. Take a short walk (10 to 20 minutes) on a level surface (no slopes, hills, or stairs) every 2 to 3 hours. Walk only distances you can manage without pain, especially leg pain. · Relax. Find a comfortable position for rest. Some people are comfortable on the floor or a medium-firm bed with a small pillow under their head and another under their knees. Some people prefer to lie on their side with a pillow between their knees. Don't stay in one position for too long. · Try heat or ice. Try using a heating pad on a low or medium setting, or take a warm shower, for 15 to 20 minutes every 2 to 3 hours. Or you can buy single-use heat wraps that last up to 8 hours. You can also try an ice pack for 10 to 15 minutes every 2 to 3 hours. You can use an ice pack or a bag of frozen vegetables wrapped in a thin towel. There is not strong evidence that either heat or ice will help, but you can try them to see if they help. You may also want to try switching between heat and cold. · Take pain medicine exactly as directed. ¨ If the doctor gave you a prescription medicine for pain, take it as prescribed. ¨ If you are not taking a prescription pain medicine, ask your doctor if you can take an over-the-counter medicine. What else can you do? · Stretch and exercise. Exercises that increase flexibility may relieve your pain and make it easier for your muscles to keep your spine in a good, neutral position. And don't forget to keep walking. · Do self-massage.  You can use self-massage to unwind after work or school or to energize yourself in the morning. You can easily massage your feet, hands, or neck. Self-massage works best if you are in comfortable clothes and are sitting or lying in a comfortable position. Use oil or lotion to massage bare skin. · Reduce stress. Back pain can lead to a vicious [place]: Distress about the pain tenses the muscles in your back, which in turn causes more pain. Learn how to relax your mind and your muscles to lower your stress. Where can you learn more? Go to http://mitchell-karen.info/. Enter J760 in the search box to learn more about \"Learning About Relief for Back Pain. \"  Current as of: May 23, 2016  Content Version: 11.2  © 3937-0488 [employer]. Care instructions adapted under license by [employer] (which disclaims liability or warranty for this information). If you have questions about a medical condition or this instruction, always ask your healthcare professional. [place] [address] any warranty or liability for your use of this information. Sciatica: Exercises  Your Care Instructions  Here are some examples of typical rehabilitation exercises for your condition. Start each exercise slowly. Ease off the exercise if you start to have pain. Your doctor or physical therapist will tell you when you can start these exercises and which ones will work best for you. When you are not being active, find a comfortable position for rest. Some people are comfortable on the floor or a medium-firm bed with a small pillow under their head and another under their knees. Some people prefer to lie on their side with a pillow between their knees. Don't stay in one position for too long. Take short walks (10 to 20 minutes) every 2 to 3 hours. Avoid slopes, hills, and stairs until you feel better. Walk only distances you can manage without pain, especially leg pain. How to do the exercises  Back stretches    1.  Get down on your hands and knees on the floor. 2. Relax your head and allow it to droop. Round your back up toward the ceiling until you feel a nice stretch in your upper, middle, and lower back. Hold this stretch for as long as it feels comfortable, or about 15 to 30 seconds. 3. Return to the starting position with a flat back while you are on your hands and knees. 4. Let your back sway by pressing your stomach toward the floor. Lift your buttocks toward the ceiling. 5. Hold this position for 15 to 30 seconds. 6. Repeat 2 to 4 times. Follow-up care is a key part of your treatment and safety. Be sure to make and go to all appointments, and call your doctor if you are having problems. It's also a good idea to know your test results and keep a list of the medicines you take. Where can you learn more? Go to http://mitchell-karen.info/. Enter Q978 in the search box to learn more about \"Sciatica: Exercises. \"  Current as of: May 23, 2016  Content Version: 11.2  © 0026-1765 [employer]. Care instructions adapted under license by [employer] (which disclaims liability or warranty for this information). If you have questions about a medical condition or this instruction, always ask your healthcare professional. [address] any warranty or liability for your use of this information. Sciatica: Care Instructions  Your Care Instructions    Sciatica (say \"igv-GA-cv-kuh\") is an irritation of one of the sciatic nerves, which come from the spinal cord in the lower back. The sciatic nerves and their branches extend down through the buttock to the foot. Sciatica can develop when an injured disc in the back presses against a spinal nerve root. Its main symptom is pain, numbness, or weakness that is often worse in the leg or foot than in the back. Sciatica often will improve and go away with time.  Early treatment usually includes medicines and exercises to relieve pain. Follow-up care is a key part of your treatment and safety. Be sure to make and go to all appointments, and call your doctor if you are having problems. It's also a good idea to know your test results and keep a list of the medicines you take. How can you care for yourself at home? · Take pain medicines exactly as directed. ¨ If the doctor gave you a prescription medicine for pain, take it as prescribed. ¨ If you are not taking a prescription pain medicine, ask your doctor if you can take an over-the-counter medicine. · Use heat or ice to relieve pain. ¨ To apply heat, put a warm water bottle, heating pad set on low, or warm cloth on your back. Do not go to sleep with a heating pad on your skin. ¨ To use ice, put ice or a cold pack on the area for 10 to 20 minutes at a time. Put a thin cloth between the ice and your skin. · Avoid sitting if possible, unless it feels better than standing. · Alternate lying down with short walks. Increase your walking distance as you are able to without making your symptoms worse. · Do not do anything that makes your symptoms worse. When should you call for help? Call 911 anytime you think you may need emergency care. For example, call if:  · You are unable to move a leg at all. Call your doctor now or seek immediate medical care if:  · You have new or worse symptoms in your legs or buttocks. Symptoms may include:  ¨ Numbness or tingling. ¨ Weakness. ¨ Pain. · You lose bladder or bowel control. Watch closely for changes in your health, and be sure to contact your doctor if:  · You are not getting better as expected. Where can you learn more? Go to http://mitchell-karen.info/. Enter [phone] in the search box to learn more about \"Sciatica: Care Instructions. \"  Current as of: May 23, 2016  Content Version: 11.2  © 7114-1600 [employer]  Care instructions adapted under license by [employer] (which disclaims liability or warranty for this information). If you have questions about a medical condition or this instruction, always ask your healthcare professional. [place] [address] any warranty or liability for your use of this information.

## 2017-04-09 NOTE — ED PROVIDER NOTES
HPI Comments: 53 y/o f w hsx chronic low back pain to ed with numbness in thighs and coccyx pain since yesterday. Had taken grand children to [facility] egg hun, and when getting back into car noted her anterior thighs were numb and painful. While still in car, had to stop and let her daughter drive due to pain that began in coccyx region. She went home and rested, but in night woke up to both feet being numb. She admits over the last 6 mos she has had episodes of urinary incontinence that she attributed to age and child birth history. Now, she denies numbness, has moderate pain with any movement. Patient is a 54 y.o. female presenting with coccyx pain. The history is provided by the patient. No  was used. Tailbone Pain    This is a new problem. The current episode started yesterday. The problem has not changed since onset. The pain is associated with no known injury. The pain is present in the lower back. The pain is moderate. The pain is the same all the time. Associated symptoms include numbness (bilat thighs and feet) and bladder incontinence (last 6 mos). Pertinent negatives include no chest pain, no fever, no weight loss, no headaches, no abdominal pain, no abdominal swelling, no bowel incontinence, no perianal numbness, no dysuria, no pelvic pain, no leg pain, no paresthesias, no paresis, no tingling and no weakness. Past Medical History:   Diagnosis Date    Arthritis     CAD (coronary artery disease) CABG 2009    stented x 1 spring of 2015    Chronic back pain states cannot lie flat due to back pain    Chronic kidney disease     Chronic obstructive pulmonary disease (HCC)     Chronic pain     Coagulation defect ([facility] [address]     effient- did not hold prior to procedure .  office and Dr. Vang  aware    Depression     GERD (gastroesophageal reflux disease)     Hypercholesterolemia     Hypertension     Joint ache     Memory impairment     MI, old x 2    2009    Obesity (BMI 30-39.9)     36.6    Persistent insomnia      S/P CABG x 4     Smoker     38 yr hx/ 1 pk per day.  Stented coronary artery     EF 55-60% echo 12/15       Past Surgical History:   Procedure Laterality Date    HX  SECTION      x3    HX CHOLECYSTECTOMY      HX CORONARY ARTERY BYPASS GRAFT  2009    x 4 grafts    HX CORONARY STENT PLACEMENT  2015    x1 spring    HX HYSTERECTOMY      HX ORTHOPAEDIC      left knee arthroscopy     HX OTHER SURGICAL  2010    renal stents     HX SALPINGO-OOPHORECTOMY           Family History:   Problem Relation Age of Onset    Diabetes Mother     Heart Disease Mother     Kidney Disease Mother     Lung Disease Mother     Diabetes Father     Heart Disease Father     Kidney Disease Father     Lung Disease Father        Social History     Social History    Marital status: SINGLE     Spouse name: N/A    Number of children: N/A    Years of education: N/A     Occupational History    Not on file. Social History Main Topics    Smoking status: Former Smoker     Quit date: 10/9/2016    Smokeless tobacco: Never Used    Alcohol use No      Comment:      Drug use: No    Sexual activity: Not on file     Other Topics Concern    Not on file     Social History Narrative         ALLERGIES: Review of patient's allergies indicates no known allergies. Review of Systems   Constitutional: Negative for chills, fever and weight loss. HENT: Negative for facial swelling and mouth sores. Eyes: Negative for discharge and redness. Respiratory: Negative for cough and shortness of breath. Cardiovascular: Negative for chest pain. Gastrointestinal: Positive for nausea and vomiting (yestserday evening with pain). Negative for abdominal pain and bowel incontinence. Endocrine: Negative for cold intolerance and heat intolerance. Genitourinary: Positive for bladder incontinence (last 6 mos) and difficulty urinating.  Negative for dysuria, flank pain and pelvic pain. Urinary incontinence last 6 mos     Musculoskeletal: Positive for back pain, gait problem and myalgias. Negative for neck pain and neck stiffness. Skin: Negative for pallor and rash. Neurological: Positive for numbness (bilat thighs and feet). Negative for dizziness, tingling, tremors, facial asymmetry, weakness, light-headedness, headaches and paresthesias. Psychiatric/Behavioral: Negative for confusion and decreased concentration. Vitals:    04/09/17 1104   BP: 137/73   Pulse: 72   Resp: 16   Temp: 98.4 °F (36.9 °C)   SpO2: 99%   Weight: 108.4 kg (239 lb)   Height: 5' 7\" (1.702 m)            Physical Exam   Constitutional: She is oriented to person, place, and time. She appears well-developed and well-nourished. No distress. HENT:   Head: Normocephalic and atraumatic. Right Ear: External ear normal.   Left Ear: External ear normal.   Nose: Nose normal.   Eyes: Conjunctivae and EOM are normal. Pupils are equal, round, and reactive to light. Neck: Normal range of motion. Neck supple. Cardiovascular: Normal rate, regular rhythm and normal heart sounds. Pulmonary/Chest: Effort normal and breath sounds normal. No respiratory distress. She has no wheezes. Abdominal: Soft. Bowel sounds are normal. She exhibits no distension. There is no tenderness. Musculoskeletal: Normal range of motion. She exhibits tenderness. She exhibits no edema. Lumbar back: She exhibits tenderness. Back:    Neurological: She is alert and oriented to person, place, and time. No cranial nerve deficit. Coordination normal.   Right patellar reflex normal.  Left patellar reflex - she withdrew from pain but did not kick forward. She admits hsx of knee surgery to same about one year ago   Skin: Skin is warm and dry. No rash noted. Psychiatric: She has a normal mood and affect. Her behavior is normal. Judgment and thought content normal.   Nursing note and vitals reviewed.        [facility]  Number of Diagnoses or Management Options  Diagnosis management comments: 53 y/o f w hsx chronic lumbar pain, cad s/p stents and 4 v cabg, ckd, htn, copd, depression, on chronic effient to ed with new coccyx pain since yesterday accompanied by numbness in her legs and feet. Also complicating hsx includes urinary incontinence last 6 mos. Will eval baseline xrays, urine, and provide pain control. D/w dr Amber Rees  12:52 PM  Feeling a little better after meds. Moving easier. Still with pain however.   xrays normal. Urine dip normal   Will dc home with acute on chronic back pain, to follow with spine center       Amount and/or Complexity of Data Reviewed  Clinical lab tests: ordered and reviewed  Tests in the radiology section of CPT®: ordered and reviewed  Discuss the patient with other providers: yes (paloma)    Risk of Complications, Morbidity, and/or Mortality  Presenting problems: minimal  Diagnostic procedures: minimal  Management options: low    Patient Progress  Patient progress: stable    ED Course       Procedures

## 2017-04-12 ENCOUNTER — HOSPITAL ENCOUNTER (EMERGENCY)
Age: 52
Discharge: HOME | End: 2017-04-12
Attending: EMERGENCY MEDICINE
Payer: MEDICARE

## 2017-04-12 VITALS
OXYGEN SATURATION: 100 % | WEIGHT: 240 LBS | DIASTOLIC BLOOD PRESSURE: 88 MMHG | RESPIRATION RATE: 18 BRPM | BODY MASS INDEX: 37.67 KG/M2 | SYSTOLIC BLOOD PRESSURE: 162 MMHG | HEIGHT: 67 IN | TEMPERATURE: 98.2 F | HEART RATE: 90 BPM

## 2017-04-12 DIAGNOSIS — M53.3 COCCYX PAIN: Primary | ICD-10-CM

## 2017-04-12 LAB
ALBUMIN SERPL BCP-MCNC: 4.2 G/DL (ref 3.5–5)
ALBUMIN/GLOB SERPL: 1.2 {RATIO} (ref 1.2–3.5)
ALP SERPL-CCNC: 36 U/L (ref 50–136)
ALT SERPL-CCNC: 41 U/L (ref 12–65)
ANION GAP BLD CALC-SCNC: 9 MMOL/L (ref 7–16)
AST SERPL W P-5'-P-CCNC: 36 U/L (ref 15–37)
BASOPHILS # BLD AUTO: 0 K/UL (ref 0–0.2)
BASOPHILS # BLD: 0 % (ref 0–2)
BILIRUB SERPL-MCNC: 0.3 MG/DL (ref 0.2–1.1)
BUN SERPL-MCNC: 13 MG/DL (ref 6–23)
CALCIUM SERPL-MCNC: 9 MG/DL (ref 8.3–10.4)
CHLORIDE SERPL-SCNC: 103 MMOL/L (ref 98–107)
CO2 SERPL-SCNC: 26 MMOL/L (ref 21–32)
CREAT SERPL-MCNC: 0.99 MG/DL (ref 0.6–1)
DIFFERENTIAL METHOD BLD: ABNORMAL
EOSINOPHIL # BLD: 0.2 K/UL (ref 0–0.8)
EOSINOPHIL NFR BLD: 1 % (ref 0.5–7.8)
ERYTHROCYTE [DISTWIDTH] IN BLOOD BY AUTOMATED COUNT: 13.5 % (ref 11.9–14.6)
GLOBULIN SER CALC-MCNC: 3.6 G/DL (ref 2.3–3.5)
GLUCOSE SERPL-MCNC: 116 MG/DL (ref 65–100)
HCT VFR BLD AUTO: 41.9 % (ref 35.8–46.3)
HGB BLD-MCNC: 14.3 G/DL (ref 11.7–15.4)
IMM GRANULOCYTES # BLD: 0 K/UL (ref 0–0.5)
IMM GRANULOCYTES NFR BLD AUTO: 0.3 % (ref 0–5)
LYMPHOCYTES # BLD AUTO: 35 % (ref 13–44)
LYMPHOCYTES # BLD: 4 K/UL (ref 0.5–4.6)
MCH RBC QN AUTO: 30.4 PG (ref 26.1–32.9)
MCHC RBC AUTO-ENTMCNC: 34.1 G/DL (ref 31.4–35)
MCV RBC AUTO: 89.1 FL (ref 79.6–97.8)
MONOCYTES # BLD: 0.6 K/UL (ref 0.1–1.3)
MONOCYTES NFR BLD AUTO: 5 % (ref 4–12)
NEUTS SEG # BLD: 6.7 K/UL (ref 1.7–8.2)
NEUTS SEG NFR BLD AUTO: 59 % (ref 43–78)
PLATELET # BLD AUTO: 258 K/UL (ref 150–450)
PMV BLD AUTO: 11.6 FL (ref 10.8–14.1)
POTASSIUM SERPL-SCNC: 4.6 MMOL/L (ref 3.5–5.1)
PROT SERPL-MCNC: 7.8 G/DL (ref 6.3–8.2)
RBC # BLD AUTO: 4.7 M/UL (ref 4.05–5.25)
SODIUM SERPL-SCNC: 138 MMOL/L (ref 136–145)
WBC # BLD AUTO: 11.6 K/UL (ref 4.3–11.1)

## 2017-04-12 PROCEDURE — 80053 COMPREHEN METABOLIC PANEL: CPT | Performed by: NURSE PRACTITIONER

## 2017-04-12 PROCEDURE — 74011250636 HC RX REV CODE- 250/636: Performed by: NURSE PRACTITIONER

## 2017-04-12 PROCEDURE — 99283 EMERGENCY DEPT VISIT LOW MDM: CPT | Performed by: NURSE PRACTITIONER

## 2017-04-12 PROCEDURE — 74011250637 HC RX REV CODE- 250/637: Performed by: NURSE PRACTITIONER

## 2017-04-12 PROCEDURE — 96375 TX/PRO/DX INJ NEW DRUG ADDON: CPT | Performed by: NURSE PRACTITIONER

## 2017-04-12 PROCEDURE — 85025 COMPLETE CBC W/AUTO DIFF WBC: CPT | Performed by: NURSE PRACTITIONER

## 2017-04-12 PROCEDURE — 96374 THER/PROPH/DIAG INJ IV PUSH: CPT | Performed by: NURSE PRACTITIONER

## 2017-04-12 PROCEDURE — 96376 TX/PRO/DX INJ SAME DRUG ADON: CPT | Performed by: NURSE PRACTITIONER

## 2017-04-12 RX ORDER — HYDROMORPHONE HYDROCHLORIDE 1 MG/ML
0.5 INJECTION, SOLUTION INTRAMUSCULAR; INTRAVENOUS; SUBCUTANEOUS
Status: COMPLETED | OUTPATIENT
Start: 2017-04-12 | End: 2017-04-12

## 2017-04-12 RX ORDER — LIDOCAINE 50 MG/G
1 PATCH TOPICAL EVERY 24 HOURS
Status: DISCONTINUED | OUTPATIENT
Start: 2017-04-12 | End: 2017-04-12 | Stop reason: HOSPADM

## 2017-04-12 RX ORDER — DIAZEPAM 2 MG/1
2 TABLET ORAL
Status: COMPLETED | OUTPATIENT
Start: 2017-04-12 | End: 2017-04-12

## 2017-04-12 RX ORDER — ONDANSETRON 2 MG/ML
4 INJECTION INTRAMUSCULAR; INTRAVENOUS
Status: COMPLETED | OUTPATIENT
Start: 2017-04-12 | End: 2017-04-12

## 2017-04-12 RX ADMIN — HYDROMORPHONE HYDROCHLORIDE 0.5 MG: 1 INJECTION, SOLUTION INTRAMUSCULAR; INTRAVENOUS; SUBCUTANEOUS at 15:31

## 2017-04-12 RX ADMIN — HYDROMORPHONE HYDROCHLORIDE 0.5 MG: 1 INJECTION, SOLUTION INTRAMUSCULAR; INTRAVENOUS; SUBCUTANEOUS at 16:08

## 2017-04-12 RX ADMIN — DIAZEPAM 2 MG: 2 TABLET ORAL at 15:31

## 2017-04-12 RX ADMIN — ONDANSETRON 4 MG: 2 INJECTION INTRAMUSCULAR; INTRAVENOUS at 15:31

## 2017-04-12 NOTE — ED NOTES
I have reviewed discharge instructions with the patient. The patient verbalized understanding. Opportunity given for any questions. Patient advised that since given narcotic medication that the patient is not allowed to drive while under influence of narcotic medication. Patient is having no signs of distress upon discharging patient. Signature obtained on discharge paper work for discharge.  coming to drive patient home.

## 2017-04-12 NOTE — ED PROVIDER NOTES
HPI Comments: 55 y/o f w chronic back pain seen by me on 4/09 with low back pain after twisting to get into her car the day before, returns to ed today with severe coccyx pain with numbness continued to bilat anterior thighs. Xrays at that time were neg. After dc 4/9, she tried to get in with spine center but could not (as notes in computer reflect). She has progressed to severe pain that is constant. Controls her bowel function but reports severe pain with bm. Admits about 6 mos hsx of urinary incontinence. She has attibuted this to age and child birth. Numbness persists in bilat anterior thighs. No fever or chills. No n/v/d. Appears in severe pain at present time. Says she can walk but very very slowly. Patient is a 54 y.o. female presenting with back pain. The history is provided by the patient. No  was used. Back Pain    This is a new problem. The problem has been gradually worsening. The problem occurs constantly. The pain is associated with twisting. The pain is present in the lower back. The quality of the pain is described as stabbing, shooting and aching. The pain radiates to the left thigh and right thigh. The pain is severe. The symptoms are aggravated by bending, twisting and certain positions. The pain is the same all the time. Associated symptoms include numbness (thighs), leg pain and weakness. Pertinent negatives include no chest pain, no fever, no weight loss, no headaches, no abdominal pain, no abdominal swelling, no bowel incontinence, no perianal numbness, no bladder incontinence, no dysuria, no pelvic pain, no paresthesias, no paresis and no tingling.         Past Medical History:   Diagnosis Date    Arthritis     CAD (coronary artery disease) CABG 2009    stented x 1 spring of 2015    Chronic back pain states cannot lie flat due to back pain    Chronic kidney disease     Chronic obstructive pulmonary disease (HCC)     Chronic pain     Coagulation defect ([facility] [address]     effient- did not hold prior to procedure . office and Dr. Antonieta Flores aware    Depression     GERD (gastroesophageal reflux disease)     Hypercholesterolemia     Hypertension     Joint ache     Memory impairment     MI, old x 2    2009    Obesity (BMI 30-39.9)     36.6    Persistent insomnia      S/P CABG x 4 2009    Smoker     38 yr hx/ 1 pk per day.  Stented coronary artery     EF 55-60% echo 12/15       Past Surgical History:   Procedure Laterality Date    HX  SECTION      x3    HX CHOLECYSTECTOMY      HX CORONARY ARTERY BYPASS GRAFT  2009    x 4 grafts    HX CORONARY STENT PLACEMENT  2015    x1 spring    HX HYSTERECTOMY      HX ORTHOPAEDIC      left knee arthroscopy     HX OTHER SURGICAL  2010    renal stents     HX SALPINGO-OOPHORECTOMY           Family History:   Problem Relation Age of Onset    Diabetes Mother     Heart Disease Mother     Kidney Disease Mother     Lung Disease Mother     Diabetes Father     Heart Disease Father     Kidney Disease Father     Lung Disease Father        Social History     Social History    Marital status: SINGLE     Spouse name: N/A    Number of children: N/A    Years of education: N/A     Occupational History    Not on file. Social History Main Topics    Smoking status: Former Smoker     Quit date: 10/9/2016    Smokeless tobacco: Never Used    Alcohol use No      Comment:      Drug use: No    Sexual activity: Not on file     Other Topics Concern    Not on file     Social History Narrative         ALLERGIES: Review of patient's allergies indicates no known allergies. Review of Systems   Constitutional: Negative for chills, fever and weight loss. HENT: Negative for facial swelling and mouth sores. Eyes: Negative for discharge and redness. Respiratory: Negative for cough and shortness of breath. Cardiovascular: Negative for chest pain and palpitations.    Gastrointestinal: Negative for abdominal pain, bowel incontinence, nausea and vomiting. Endocrine: Negative for cold intolerance and heat intolerance. Genitourinary: Positive for difficulty urinating. Negative for bladder incontinence, dysuria and pelvic pain. Musculoskeletal: Positive for back pain and gait problem. Skin: Negative for pallor and rash. Neurological: Positive for weakness and numbness (thighs). Negative for dizziness, tingling, headaches and paresthesias. Psychiatric/Behavioral: Negative for confusion and decreased concentration. Vitals:    04/12/17 1447   BP: 198/90   Pulse: 93   Resp: 18   Temp: 98.2 °F (36.8 °C)   SpO2: 99%   Weight: 108.9 kg (240 lb)   Height: 5' 7\" (1.702 m)            Physical Exam   Constitutional: She is oriented to person, place, and time. She appears well-developed and well-nourished. No distress. HENT:   Head: Normocephalic and atraumatic. Right Ear: External ear normal.   Left Ear: External ear normal.   Nose: Nose normal.   Eyes: Conjunctivae and EOM are normal. Pupils are equal, round, and reactive to light. Neck: Normal range of motion. Neck supple. Cardiovascular: Normal rate, regular rhythm and normal heart sounds. Pulmonary/Chest: Effort normal and breath sounds normal. No respiratory distress. She has no wheezes. Abdominal: Soft. Bowel sounds are normal. She exhibits no distension. There is no tenderness. Musculoskeletal: Normal range of motion. She exhibits tenderness. She exhibits no edema. Legs:  Neurological: She is alert and oriented to person, place, and time. She has normal reflexes. She displays normal reflexes. No cranial nerve deficit. She exhibits normal muscle tone. Coordination normal.   Skin: Skin is warm and dry. No rash noted. Psychiatric: She has a normal mood and affect. Her behavior is normal. Judgment and thought content normal.   Nursing note and vitals reviewed.        MDM  Number of Diagnoses or Management Options  Diagnosis management comments: 54 y/o f w chronic back pain to ed for return visit after twisting to get into car 4/8 and noting pain in low back that radiates into anterior thighs. Was seen here 4/9 by me, xrays were neg.  tsx with steroids and muscle relaxers. Tried to follow with spine center but did not meet qualifications. Back today with very severe pain. Patellar dtrs are intact, but pt having episodes of incontinence that have been ongoing for last 6 mos prior to arrival.  May or may not be related. Will provide pain control and get mri.  3:23 PM  Radiologist will not do mri now. Says pt needs to be done as an out pt. . . . . . . .   Will control pt pain. 3:55 PM)  Crying with pain. Will repeat dilaudid. Pt has called her family md and they will try to get mri scheduled  4:27 PM\  Pain much improved after second dose dilaudid. She has appt set up now with her md tomorrow.   Will dc home with family        Amount and/or Complexity of Data Reviewed  Tests in the radiology section of CPT®: ordered    Risk of Complications, Morbidity, and/or Mortality  Presenting problems: minimal  Diagnostic procedures: minimal  Management options: low    Patient Progress  Patient progress: stable    ED Course       Procedures

## 2017-04-12 NOTE — DISCHARGE INSTRUCTIONS
Coccyx Pain: Care Instructions  Your Care Instructions  The coccyx is your tailbone. You can have pain in your tailbone from a fall or other injury. Pregnancy and childbirth also can cause tailbone pain. Sometimes, the cause of pain is not known. A tailbone injury causes pain when you sit, especially when you slump or sit on a hard seat. Straining to have a bowel movement also can be very painful. Tailbone injuries can take several months to heal, but in some cases the pain goes even longer. You can take steps at home to ease the pain. In some cases, a doctor injects a corticosteroid medicine into the coccyx to reduce swelling and pain. Follow-up care is a key part of your treatment and safety. Be sure to make and go to all appointments, and call your doctor if you are having problems. It's also a good idea to know your test results and keep a list of the medicines you take. How can you care for yourself at home? · Take pain medicines exactly as directed. ¨ If the doctor gave you a prescription medicine for pain, take it as prescribed. ¨ If you are not taking a prescription pain medicine, take an over-the-counter medicine to reduce pain. · Put ice or a cold pack on your tailbone for 10 to 20 minutes at a time. Try to do this every 1 to 2 hours for the next 3 days (when you are awake) or until the swelling goes down. Put a thin cloth between the ice and your skin. · About 2 or 3 days after your injury, you can alternate ice and heat. To soothe the tailbone area, take a warm bath for 20 minutes, 3 or 4 times a day. · Sit on soft, padded surfaces. A doughnut-shaped pillow can take pressure off the tailbone. · Avoid constipation, because straining to have a bowel movement will increase your tailbone pain. ¨ Include fruits, vegetables, beans, and whole grains in your diet each day. These foods are high in fiber. ¨ Drink plenty of fluids, enough so that your urine is light yellow or clear like water.  If you have kidney, heart, or liver disease and have to limit fluids, talk with your doctor before you increase the amount of fluids you drink. ¨ Get some exercise every day. Build up slowly to 30 to 60 minutes a day on 5 or more days of the week. ¨ Take a fiber supplement, such as Citrucel or Metamucil, every day if needed. Read and follow all instructions on the label. ¨ Schedule time each day for a bowel movement. A daily routine may help. Take your time and do not strain when having a bowel movement. · Follow your doctor's directions for stretching and other exercises that might help with pain. When should you call for help? Call 911 anytime you think you may need emergency care. For example, call if:  · You are unable to move a leg at all. Call your doctor now or seek immediate medical care if:  · You have new or worse symptoms in your legs or buttocks. Symptoms may include:  ¨ Numbness or tingling. ¨ Weakness. ¨ Pain. · You lose bladder or bowel control. Watch closely for changes in your health, and be sure to contact your doctor if:  · You are not getting better as expected. Where can you learn more? Go to http://mitchell-karen.info/. Enter W386 in the search box to learn more about \"Coccyx Pain: Care Instructions. \"  Current as of: May 27, 2016  Content Version: 11.2  © 7498-2015 [employer]. Care instructions adapted under license by [email or web address] (which disclaims liability or warranty for this information). If you have questions about a medical condition or this instruction, always ask your healthcare professional. [place] [address] any warranty or liability for your use of this information.

## 2017-04-12 NOTE — ED TRIAGE NOTES
Patient complains of lower back pain that began Saturday. States seen here Sunday and dx with sciatica. States pain is worse today.

## 2017-05-24 ENCOUNTER — HOSPITAL ENCOUNTER (OUTPATIENT)
Dept: SLEEP MEDICINE | Age: 52
Discharge: HOME | End: 2017-05-24
Payer: MEDICARE

## 2017-05-24 PROCEDURE — 95810 POLYSOM 6/> YRS 4/> PARAM: CPT

## 2017-06-01 PROBLEM — R06.83 SNORING: Status: ACTIVE | Noted: 2017-06-01

## 2017-06-14 ENCOUNTER — HOSPITAL ENCOUNTER (OUTPATIENT)
Dept: SLEEP MEDICINE | Age: 52
Discharge: HOME | End: 2017-06-14
Payer: MEDICARE

## 2017-06-14 PROCEDURE — 95810 POLYSOM 6/> YRS 4/> PARAM: CPT

## 2017-07-26 ENCOUNTER — HOSPITAL ENCOUNTER (OUTPATIENT)
Dept: SLEEP MEDICINE | Age: 52
Discharge: HOME | End: 2017-07-26
Payer: MEDICARE

## 2017-07-26 PROCEDURE — 95806 SLEEP STUDY UNATT&RESP EFFT: CPT

## 2017-08-16 ENCOUNTER — HOSPITAL ENCOUNTER (OUTPATIENT)
Dept: SLEEP MEDICINE | Age: 52
Discharge: HOME | End: 2017-08-16
Payer: MEDICARE

## 2017-08-16 PROCEDURE — 95806 SLEEP STUDY UNATT&RESP EFFT: CPT

## 2017-10-11 ENCOUNTER — HOSPITAL ENCOUNTER (OUTPATIENT)
Dept: MAMMOGRAPHY | Age: 52
Discharge: HOME | End: 2017-10-11
Attending: INTERNAL MEDICINE
Payer: MEDICARE

## 2017-10-11 DIAGNOSIS — Z12.31 ENCOUNTER FOR SCREENING MAMMOGRAM FOR MALIGNANT NEOPLASM OF BREAST: ICD-10-CM

## 2017-10-11 PROCEDURE — 77067 SCR MAMMO BI INCL CAD: CPT

## 2017-11-13 ENCOUNTER — HOSPITAL ENCOUNTER (EMERGENCY)
Age: 52
Discharge: HOME | End: 2017-11-13
Attending: EMERGENCY MEDICINE
Payer: MEDICARE

## 2017-11-13 VITALS
SYSTOLIC BLOOD PRESSURE: 148 MMHG | RESPIRATION RATE: 18 BRPM | HEART RATE: 87 BPM | OXYGEN SATURATION: 98 % | TEMPERATURE: 98.8 F | DIASTOLIC BLOOD PRESSURE: 87 MMHG | HEIGHT: 68 IN | BODY MASS INDEX: 32.89 KG/M2 | WEIGHT: 217 LBS

## 2017-11-13 DIAGNOSIS — M79.18 MUSCULOSKELETAL PAIN: Primary | ICD-10-CM

## 2017-11-13 PROCEDURE — 99283 EMERGENCY DEPT VISIT LOW MDM: CPT | Performed by: EMERGENCY MEDICINE

## 2017-11-13 RX ORDER — DICLOFENAC SODIUM 50 MG/1
50 TABLET, DELAYED RELEASE ORAL 2 TIMES DAILY
Qty: 14 TAB | Refills: 0 | Status: SHIPPED | OUTPATIENT
Start: 2017-11-13 | End: 2018-01-02

## 2017-11-13 RX ORDER — CYCLOBENZAPRINE HCL 10 MG
10 TABLET ORAL
Qty: 15 TAB | Refills: 0 | Status: SHIPPED | OUTPATIENT
Start: 2017-11-13 | End: 2018-01-02
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
## 2017-11-13 NOTE — ED NOTES
I have reviewed discharge instructions with the patient. The patient verbalized understanding. Patient left ED via Discharge Method: ambulatory to Home with (insert name of family/friend, self, transport self). Opportunity for questions and clarification provided. Patient given 2 scripts. To continue your aftercare when you leave the hospital, you may receive an automated call from our care team to check in on how you are doing. This is a free service and part of our promise to provide the best care and service to meet your aftercare needs.  If you have questions, or wish to unsubscribe from this service please call [phone]. Thank you for Choosing our [facility] Emergency Department.
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
## 2017-11-13 NOTE — DISCHARGE INSTRUCTIONS
Return with any fevers, vomiting, abdominal pain, worsening symptoms, or additional concerns. You will get worse for 2-3 days and it may be 2-3 weeks before you are back to normal. Try to be up moving arround as much as possible. Follow up with your regular doctor for further care as needed.

## 2017-12-26 ENCOUNTER — HOSPITAL ENCOUNTER (EMERGENCY)
Age: 52
Discharge: HOME | End: 2017-12-27
Attending: EMERGENCY MEDICINE
Payer: MEDICARE

## 2017-12-26 ENCOUNTER — APPOINTMENT (OUTPATIENT)
Dept: ULTRASOUND IMAGING | Age: 52
End: 2017-12-26
Attending: EMERGENCY MEDICINE
Payer: MEDICARE

## 2017-12-26 DIAGNOSIS — R20.2 PARESTHESIA AND PAIN OF BOTH UPPER EXTREMITIES: ICD-10-CM

## 2017-12-26 DIAGNOSIS — M79.602 PARESTHESIA AND PAIN OF BOTH UPPER EXTREMITIES: ICD-10-CM

## 2017-12-26 DIAGNOSIS — M79.605 PAIN OF LEFT LOWER EXTREMITY: Primary | ICD-10-CM

## 2017-12-26 DIAGNOSIS — M79.601 PARESTHESIA AND PAIN OF BOTH UPPER EXTREMITIES: ICD-10-CM

## 2017-12-26 LAB
ALBUMIN SERPL-MCNC: 3.7 G/DL (ref 3.5–5)
ALBUMIN/GLOB SERPL: 1 {RATIO} (ref 1.2–3.5)
ALP SERPL-CCNC: 44 U/L (ref 50–136)
ALT SERPL-CCNC: 27 U/L (ref 12–65)
ANION GAP SERPL CALC-SCNC: 9 MMOL/L (ref 7–16)
AST SERPL-CCNC: 23 U/L (ref 15–37)
ATRIAL RATE: 73 BPM
BASOPHILS # BLD: 0 K/UL (ref 0–0.2)
BASOPHILS NFR BLD: 0 % (ref 0–2)
BILIRUB DIRECT SERPL-MCNC: 0.1 MG/DL
BILIRUB SERPL-MCNC: 0.3 MG/DL (ref 0.2–1.1)
BUN SERPL-MCNC: 12 MG/DL (ref 6–23)
CALCIUM SERPL-MCNC: 9.2 MG/DL (ref 8.3–10.4)
CALCULATED P AXIS, ECG09: 77 DEGREES
CALCULATED R AXIS, ECG10: 71 DEGREES
CALCULATED T AXIS, ECG11: 82 DEGREES
CHLORIDE SERPL-SCNC: 102 MMOL/L (ref 98–107)
CO2 SERPL-SCNC: 28 MMOL/L (ref 21–32)
CREAT SERPL-MCNC: 0.78 MG/DL (ref 0.6–1)
DIAGNOSIS, 93000: NORMAL
DIFFERENTIAL METHOD BLD: ABNORMAL
EOSINOPHIL # BLD: 0.1 K/UL (ref 0–0.8)
EOSINOPHIL NFR BLD: 2 % (ref 0.5–7.8)
ERYTHROCYTE [DISTWIDTH] IN BLOOD BY AUTOMATED COUNT: 13 % (ref 11.9–14.6)
GLOBULIN SER CALC-MCNC: 3.8 G/DL (ref 2.3–3.5)
GLUCOSE SERPL-MCNC: 86 MG/DL (ref 65–100)
HCT VFR BLD AUTO: 40.6 % (ref 35.8–46.3)
HGB BLD-MCNC: 13.9 G/DL (ref 11.7–15.4)
IMM GRANULOCYTES # BLD: 0 K/UL (ref 0–0.5)
IMM GRANULOCYTES NFR BLD AUTO: 0 % (ref 0–5)
LYMPHOCYTES # BLD: 3.4 K/UL (ref 0.5–4.6)
LYMPHOCYTES NFR BLD: 35 % (ref 13–44)
MAGNESIUM SERPL-MCNC: 1.8 MG/DL (ref 1.8–2.4)
MCH RBC QN AUTO: 30.5 PG (ref 26.1–32.9)
MCHC RBC AUTO-ENTMCNC: 34.2 G/DL (ref 31.4–35)
MCV RBC AUTO: 89.2 FL (ref 79.6–97.8)
MONOCYTES # BLD: 0.7 K/UL (ref 0.1–1.3)
MONOCYTES NFR BLD: 7 % (ref 4–12)
NEUTS SEG # BLD: 5.4 K/UL (ref 1.7–8.2)
NEUTS SEG NFR BLD: 56 % (ref 43–78)
P-R INTERVAL, ECG05: 156 MS
PLATELET # BLD AUTO: 228 K/UL (ref 150–450)
PMV BLD AUTO: 10.6 FL (ref 10.8–14.1)
POTASSIUM SERPL-SCNC: 3.7 MMOL/L (ref 3.5–5.1)
PROT SERPL-MCNC: 7.5 G/DL (ref 6.3–8.2)
Q-T INTERVAL, ECG07: 412 MS
QRS DURATION, ECG06: 96 MS
QTC CALCULATION (BEZET), ECG08: 453 MS
RBC # BLD AUTO: 4.55 M/UL (ref 4.05–5.25)
SODIUM SERPL-SCNC: 139 MMOL/L (ref 136–145)
TROPONIN I BLD-MCNC: 0.01 NG/ML (ref 0.02–0.05)
VENTRICULAR RATE, ECG03: 73 BPM
WBC # BLD AUTO: 9.7 K/UL (ref 4.3–11.1)

## 2017-12-26 PROCEDURE — 93005 ELECTROCARDIOGRAM TRACING: CPT | Performed by: EMERGENCY MEDICINE

## 2017-12-26 PROCEDURE — 93971 EXTREMITY STUDY: CPT

## 2017-12-26 PROCEDURE — 99285 EMERGENCY DEPT VISIT HI MDM: CPT | Performed by: EMERGENCY MEDICINE

## 2017-12-26 PROCEDURE — 80048 BASIC METABOLIC PNL TOTAL CA: CPT | Performed by: EMERGENCY MEDICINE

## 2017-12-26 PROCEDURE — 85025 COMPLETE CBC W/AUTO DIFF WBC: CPT | Performed by: EMERGENCY MEDICINE

## 2017-12-26 PROCEDURE — 80076 HEPATIC FUNCTION PANEL: CPT | Performed by: EMERGENCY MEDICINE

## 2017-12-26 PROCEDURE — 84484 ASSAY OF TROPONIN QUANT: CPT

## 2017-12-26 PROCEDURE — 83735 ASSAY OF MAGNESIUM: CPT | Performed by: EMERGENCY MEDICINE

## 2017-12-26 RX ORDER — SODIUM CHLORIDE 0.9 % (FLUSH) 0.9 %
5-10 SYRINGE (ML) INJECTION AS NEEDED
Status: DISCONTINUED | OUTPATIENT
Start: 2017-12-26 | End: 2017-12-27 | Stop reason: HOSPADM

## 2017-12-26 RX ORDER — SODIUM CHLORIDE 0.9 % (FLUSH) 0.9 %
5-10 SYRINGE (ML) INJECTION EVERY 8 HOURS
Status: DISCONTINUED | OUTPATIENT
Start: 2017-12-26 | End: 2017-12-27 | Stop reason: HOSPADM

## 2017-12-26 NOTE — ED TRIAGE NOTES
Pt in c/o left leg pain, tingling in left toes. Left arm pain left hand swelling x 2 days. States she is not sure what happened. Pt also states she vomited once today. Pt ambulatory to triage without difficulty.

## 2017-12-26 NOTE — ED PROVIDER NOTES
HPI Comments: 26-year-old white female with history of coronary artery disease status post bypass presents with multiple complaints. She reports for the past 2 days she has had pain in her left calf. Pain is aggravated by walking and palpation. She is also complaining of tingling to her extremities and some discomfort in her left axilla. Today she had one episode of vomiting. She she has had intermittent dizziness and sensation that she might \"pass out\". She has not had a syncopal episode. No fever or cough. No urinary symptoms. No abdominal pain. She has tried hydrocodone for pain. Patient is a 50 y.o. female presenting with leg pain. The history is provided by the patient. Leg Pain    Pertinent negatives include no back pain and no neck pain. Past Medical History:   Diagnosis Date    Arthritis     CAD (coronary artery disease) CABG 2009    stented x 1 spring of 2015    Chronic back pain states cannot lie flat due to back pain    Chronic kidney disease     Chronic obstructive pulmonary disease (HCC)     Chronic pain     Coagulation defect ([address]     effient- did not hold prior to procedure . office and Dr. Karl Mcdonald aware    Depression     GERD (gastroesophageal reflux disease)     Hypercholesterolemia     Hypertension     Joint ache     Memory impairment     MI, old x 2    2009    Obesity (BMI 30-39.9)     36.6    Persistent insomnia      S/P CABG x 4 2009    Smoker     36 yr hx/ 1 pk per day.      Stented coronary artery     EF 55-60% echo 12/15       Past Surgical History:   Procedure Laterality Date    HX  SECTION      x3    HX CHOLECYSTECTOMY      HX CORONARY ARTERY BYPASS GRAFT  2009    x 4 grafts    HX CORONARY STENT PLACEMENT  2015    x1 spring    HX HYSTERECTOMY      HX ORTHOPAEDIC      left knee arthroscopy     HX OTHER SURGICAL      renal stents     HX SALPINGO-OOPHORECTOMY           Family History:   Problem Relation Age of Onset    Diabetes Mother     Heart Disease Mother     Kidney Disease Mother     Lung Disease Mother     Diabetes Father     Heart Disease Father     Kidney Disease Father     Lung Disease Father        Social History     Social History    Marital status: SINGLE     Spouse name: N/A    Number of children: N/A    Years of education: N/A     Occupational History    Not on file. Social History Main Topics    Smoking status: Former Smoker     Packs/day: 1.00     Years: 30.00     Types: Cigarettes     Quit date: 10/9/2016    Smokeless tobacco: Never Used    Alcohol use No      Comment:      Drug use: No    Sexual activity: Not on file     Other Topics Concern    Not on file     Social History Narrative         ALLERGIES: Review of patient's allergies indicates no known allergies. Review of Systems   Constitutional: Negative for fever. HENT: Negative for congestion. Respiratory: Negative for cough and shortness of breath. Cardiovascular: Negative for chest pain and leg swelling. Gastrointestinal: Positive for nausea and vomiting. Negative for abdominal pain. Genitourinary: Negative for dysuria. Musculoskeletal: Negative for back pain and neck pain. Skin: Negative for rash. Neurological: Negative for headaches. Vitals:    12/26/17 1702   BP: (!) 174/97   Pulse: 86   Resp: 17   Temp: 97.9 °F (36.6 °C)   SpO2: 96%   Weight: 93 kg (205 lb)   Height: 5' 7\" (1.702 m)            Physical Exam   Constitutional: She is oriented to person, place, and time. She appears well-developed and well-nourished. No distress. HENT:   Head: Normocephalic and atraumatic. Mouth/Throat: Oropharynx is clear and moist.   Eyes: Conjunctivae are normal. Pupils are equal, round, and reactive to light. Neck: Normal range of motion. Neck supple. Cardiovascular: Normal rate and regular rhythm. No murmur heard. Pulmonary/Chest: Effort normal and breath sounds normal. She has no wheezes. Abdominal: Soft.  She exhibits no distension. There is no tenderness. Musculoskeletal: Normal range of motion. She exhibits tenderness. She exhibits no edema. Left calf is tender to palpation without palpable cords or edema. Good range of motion. Good distal pulses sensation and   Neurological: She is alert and oriented to person, place, and time. Skin: Skin is warm and dry. Psychiatric: She has a normal mood and affect. Nursing note and vitals reviewed. MDM  Number of Diagnoses or Management Options  Diagnosis management comments: Blood work is normal.  EKG shows no concerning changes. Ultrasound shows no evidence of DVT. Etiology for patient's symptoms are not identified but does not appear to be any acute infectious or surgical emergency. No electrolyte abnormalities. She appears safe for discharge home.   Advised to monitor symptoms and follow up with primary care later this week       Amount and/or Complexity of Data Reviewed  Clinical lab tests: ordered and reviewed  Tests in the radiology section of CPT®: ordered and reviewed      ED Course       Procedures

## 2017-12-27 VITALS
BODY MASS INDEX: 32.18 KG/M2 | SYSTOLIC BLOOD PRESSURE: 150 MMHG | RESPIRATION RATE: 18 BRPM | WEIGHT: 205 LBS | HEART RATE: 74 BPM | OXYGEN SATURATION: 97 % | TEMPERATURE: 98 F | HEIGHT: 67 IN | DIASTOLIC BLOOD PRESSURE: 64 MMHG

## 2017-12-27 NOTE — DISCHARGE INSTRUCTIONS
Numbness and Tingling: Care Instructions  Your Care Instructions    Many things can cause numbness or tingling. Swelling may put pressure on a nerve. This could cause you to lose feeling or have a pins-and-needles sensation on part of your body. Nerves may be damaged from trauma, toxins, or diseases, such as diabetes or multiple sclerosis (MS). Sometimes, though, the cause is not clear. If there is no clear reason for your symptoms, and you are not having any other symptoms, your doctor may suggest watching and waiting for a while to see if the numbness or tingling goes away on its own. Your doctor may want you to have blood or nerve tests to find the cause of your symptoms. Follow-up care is a key part of your treatment and safety. Be sure to make and go to all appointments, and call your doctor if you are having problems. It's also a good idea to know your test results and keep a list of the medicines you take. How can you care for yourself at home? · If your doctor prescribes medicine, take it exactly as directed. Call your doctor if you think you are having a problem with your medicine. · If you have any swelling, put ice or a cold pack on the area for 10 to 20 minutes at a time. Put a thin cloth between the ice and your skin. When should you call for help? Call 911 anytime you think you may need emergency care. For example, call if:  ? · You have weakness, numbness, or tingling in both legs. ? · You lose bowel or bladder control. ? · You have symptoms of a stroke. These may include:  ¨ Sudden numbness, tingling, weakness, or loss of movement in your face, arm, or leg, especially on only one side of your body. ¨ Sudden vision changes. ¨ Sudden trouble speaking. ¨ Sudden confusion or trouble understanding simple statements. ¨ Sudden problems with walking or balance. ¨ A sudden, severe headache that is different from past headaches. ? Watch closely for changes in your health, and be sure to contact your doctor if you have any problems, or if:  ? · You do not get better as expected. Where can you learn more? Go to http://mitchell-karen.info/. Enter X320 in the search box to learn more about \"Numbness and Tingling: Care Instructions. \"  Current as of: October 14, 2016  Content Version: 11.4  © 6616-5052 [employer]. Care instructions adapted under license by [employer] (which disclaims liability or warranty for this information). If you have questions about a medical condition or this instruction, always ask your healthcare professional. [place] [address] any warranty or liability for your use of this information.

## 2017-12-27 NOTE — ED NOTES
I have reviewed discharge instructions with the patient. The patient verbalized understanding. Patient left ED via Discharge Method: ambulatory to Home with (family member). Opportunity for questions and clarification provided. Patient given 0 scripts. To continue your aftercare when you leave the hospital, you may receive an automated call from our care team to check in on how you are doing. This is a free service and part of our promise to provide the best care and service to meet your aftercare needs.  If you have questions, or wish to unsubscribe from this service please call [phone]. Thank you for Choosing our [facility] Emergency Department.

## 2018-01-02 ENCOUNTER — HOSPITAL ENCOUNTER (OUTPATIENT)
Dept: ULTRASOUND IMAGING | Age: 53
Discharge: HOME | End: 2018-01-02
Attending: INTERNAL MEDICINE
Payer: MEDICARE

## 2018-01-02 DIAGNOSIS — R09.89 DIMINISHED PULSES IN LOWER EXTREMITY: ICD-10-CM

## 2018-01-02 DIAGNOSIS — M79.662 TENDERNESS OF LEFT CALF: ICD-10-CM

## 2018-01-02 PROCEDURE — 93926 LOWER EXTREMITY STUDY: CPT

## 2018-01-10 PROBLEM — I77.1 ARTERIAL STENOSIS (HCC): Status: ACTIVE | Noted: 2018-01-10

## 2018-01-11 ENCOUNTER — HOSPITAL ENCOUNTER (OUTPATIENT)
Dept: SURGERY | Age: 53
Discharge: HOME | End: 2018-01-11
Payer: MEDICARE

## 2018-01-11 LAB
ANION GAP SERPL CALC-SCNC: 6 MMOL/L (ref 7–16)
BUN SERPL-MCNC: 13 MG/DL (ref 6–23)
CALCIUM SERPL-MCNC: 8.9 MG/DL (ref 8.3–10.4)
CHLORIDE SERPL-SCNC: 105 MMOL/L (ref 98–107)
CO2 SERPL-SCNC: 28 MMOL/L (ref 21–32)
CREAT SERPL-MCNC: 0.96 MG/DL (ref 0.6–1)
ERYTHROCYTE [DISTWIDTH] IN BLOOD BY AUTOMATED COUNT: 13 % (ref 11.9–14.6)
GLUCOSE SERPL-MCNC: 105 MG/DL (ref 65–100)
HCT VFR BLD AUTO: 41.5 % (ref 35.8–46.3)
HGB BLD-MCNC: 14.2 G/DL (ref 11.7–15.4)
MCH RBC QN AUTO: 30.5 PG (ref 26.1–32.9)
MCHC RBC AUTO-ENTMCNC: 34.2 G/DL (ref 31.4–35)
MCV RBC AUTO: 89.2 FL (ref 79.6–97.8)
PLATELET # BLD AUTO: 277 K/UL (ref 150–450)
PMV BLD AUTO: 10.4 FL (ref 10.8–14.1)
POTASSIUM SERPL-SCNC: 4.3 MMOL/L (ref 3.5–5.1)
RBC # BLD AUTO: 4.65 M/UL (ref 4.05–5.25)
SODIUM SERPL-SCNC: 139 MMOL/L (ref 136–145)
WBC # BLD AUTO: 8.8 K/UL (ref 4.3–11.1)

## 2018-01-11 PROCEDURE — 85027 COMPLETE CBC AUTOMATED: CPT | Performed by: SURGERY

## 2018-01-11 PROCEDURE — 36415 COLL VENOUS BLD VENIPUNCTURE: CPT | Performed by: SURGERY

## 2018-01-11 PROCEDURE — 80048 BASIC METABOLIC PNL TOTAL CA: CPT | Performed by: SURGERY

## 2018-01-12 VITALS — BODY MASS INDEX: 34.53 KG/M2 | HEIGHT: 67 IN | WEIGHT: 220 LBS

## 2018-01-12 NOTE — PERIOP NOTES
Patient verified name, , and surgery as listed in [facility]. Patient provided medical/health information and PTA medications to the best of their ability. TYPE  CASE: 1b            Orders: On chart--- dated 1/10/18  Labs per surgeon:  Tyrell Braxton-- was drawn 18---results in cc--ok for surg   Labs per anesthesia protocol: no add't  EKG  : In cc-- dated 18--- printed and placed on chart-- cardiac note from dr Aubree Torres in cc-- under care everywhere tab-- dated 18    Instructed patient to continue previous medications as prescribed prior to surgery unless otherwise directed and to take the following medications the day of surgery according to anesthesia guidelines : norvasc, asa 81 mg, coreg, imdur, synthroid, omeprazole, effient, ranexa, symbicort, bring albuterol inhaler and if needed klonopin, norco .  Instructed patient to hold  the following medications: none-- per dr Vickie Alvarez orders pt to continue effient. Patient instructed on the following:  Arrive at main entrance, time of arrival to be called the day before by 1700. Responsible adult must drive patient to and from the hospital, stay during surgery, and       patient will need supervision 24 hours after anesthesia  NPO after midnight including gum, mints and ice chips. Shower the night before and the morning of surgery with a non-moisturizing soap. Leave all valuables at home. Bring insurance card and ID. No jewelry or body piercings on the dos. No perfumes, oil, powder, colognes, makeup or  lotions on the skin. Patient teach back successful and patient demonstrates knowledge of instruction.

## 2018-01-14 ENCOUNTER — ANESTHESIA EVENT (OUTPATIENT)
Dept: SURGERY | Age: 53
DRG: 271 | End: 2018-01-14
Payer: MEDICARE

## 2018-01-15 ENCOUNTER — ANESTHESIA (OUTPATIENT)
Dept: SURGERY | Age: 53
DRG: 271 | End: 2018-01-15
Payer: MEDICARE

## 2018-01-15 ENCOUNTER — HOSPITAL ENCOUNTER (OUTPATIENT)
Age: 53
Setting detail: OUTPATIENT SURGERY
Discharge: HOME | DRG: 271 | End: 2018-01-15
Attending: SURGERY | Admitting: SURGERY
Payer: MEDICARE

## 2018-01-15 ENCOUNTER — APPOINTMENT (OUTPATIENT)
Dept: INTERVENTIONAL RADIOLOGY/VASCULAR | Age: 53
DRG: 271 | End: 2018-01-15
Attending: SURGERY
Payer: MEDICARE

## 2018-01-15 VITALS
DIASTOLIC BLOOD PRESSURE: 74 MMHG | OXYGEN SATURATION: 95 % | WEIGHT: 225 LBS | HEART RATE: 75 BPM | HEIGHT: 67 IN | RESPIRATION RATE: 18 BRPM | BODY MASS INDEX: 35.31 KG/M2 | SYSTOLIC BLOOD PRESSURE: 152 MMHG | TEMPERATURE: 98.5 F

## 2018-01-15 PROCEDURE — 74011250636 HC RX REV CODE- 250/636

## 2018-01-15 PROCEDURE — C1887 CATHETER, GUIDING: HCPCS

## 2018-01-15 PROCEDURE — 77030020782 HC GWN BAIR PAWS FLX 3M -B: Performed by: ANESTHESIOLOGY

## 2018-01-15 PROCEDURE — 76060000035 HC ANESTHESIA 2 TO 2.5 HR: Performed by: SURGERY

## 2018-01-15 PROCEDURE — C1769 GUIDE WIRE: HCPCS

## 2018-01-15 PROCEDURE — 74011636320 HC RX REV CODE- 636/320: Performed by: SURGERY

## 2018-01-15 PROCEDURE — 74011250636 HC RX REV CODE- 250/636: Performed by: SURGERY

## 2018-01-15 PROCEDURE — C1725 CATH, TRANSLUMIN NON-LASER: HCPCS

## 2018-01-15 PROCEDURE — 74011250636 HC RX REV CODE- 250/636: Performed by: ANESTHESIOLOGY

## 2018-01-15 PROCEDURE — C1876 STENT, NON-COA/NON-COV W/DEL: HCPCS

## 2018-01-15 PROCEDURE — C1894 INTRO/SHEATH, NON-LASER: HCPCS

## 2018-01-15 PROCEDURE — 76937 US GUIDE VASCULAR ACCESS: CPT

## 2018-01-15 PROCEDURE — 77030020143 HC AIRWY LARYN INTUB CGAS -A: Performed by: ANESTHESIOLOGY

## 2018-01-15 PROCEDURE — C1724 CATH, TRANS ATHEREC,ROTATION: HCPCS

## 2018-01-15 PROCEDURE — 74011000250 HC RX REV CODE- 250: Performed by: SURGERY

## 2018-01-15 PROCEDURE — C1760 CLOSURE DEV, VASC: HCPCS

## 2018-01-15 PROCEDURE — 047N3DZ DILATION OF LEFT POPLITEAL ARTERY WITH INTRALUMINAL DEVICE, PERCUTANEOUS APPROACH: ICD-10-PCS | Performed by: SURGERY

## 2018-01-15 PROCEDURE — 77030006078 HC TAPE GLOW N TEL LEMV -B

## 2018-01-15 PROCEDURE — 75710 ARTERY X-RAYS ARM/LEG: CPT

## 2018-01-15 PROCEDURE — 76010000131 HC OR TIME 2 TO 2.5 HR: Performed by: SURGERY

## 2018-01-15 PROCEDURE — 74011000250 HC RX REV CODE- 250

## 2018-01-15 PROCEDURE — 76210000017 HC OR PH I REC 1.5 TO 2 HR: Performed by: SURGERY

## 2018-01-15 PROCEDURE — 047L34Z DILATION OF LEFT FEMORAL ARTERY WITH DRUG-ELUTING INTRALUMINAL DEVICE, PERCUTANEOUS APPROACH: ICD-10-PCS | Performed by: SURGERY

## 2018-01-15 PROCEDURE — 76210000021 HC REC RM PH II 0.5 TO 1 HR: Performed by: SURGERY

## 2018-01-15 DEVICE — ABSOLUTE PRO VASCULAR SELF-EXPANDING STENT SYSTEM 7.0 MM X 30 MM X 135 CM / OVER-THE-WIRE
Type: IMPLANTABLE DEVICE | Site: ARTERIAL | Status: FUNCTIONAL
Brand: ABSOLUTE PRO

## 2018-01-15 DEVICE — ZILVER PTX, DRUG-ELUTING PERIPHERAL STENT
Type: IMPLANTABLE DEVICE | Site: GROIN | Status: FUNCTIONAL
Brand: ZILVER PTX

## 2018-01-15 RX ORDER — HYDROCODONE BITARTRATE AND ACETAMINOPHEN 5; 325 MG/1; MG/1
1 TABLET ORAL
Status: DISCONTINUED | OUTPATIENT
Start: 2018-01-15 | End: 2018-01-15 | Stop reason: HOSPADM

## 2018-01-15 RX ORDER — FENTANYL CITRATE 50 UG/ML
INJECTION, SOLUTION INTRAMUSCULAR; INTRAVENOUS AS NEEDED
Status: DISCONTINUED | OUTPATIENT
Start: 2018-01-15 | End: 2018-01-15 | Stop reason: HOSPADM

## 2018-01-15 RX ORDER — FENTANYL CITRATE 50 UG/ML
100 INJECTION, SOLUTION INTRAMUSCULAR; INTRAVENOUS ONCE
Status: DISCONTINUED | OUTPATIENT
Start: 2018-01-15 | End: 2018-01-15 | Stop reason: HOSPADM

## 2018-01-15 RX ORDER — HEPARIN SODIUM 1000 [USP'U]/ML
INJECTION, SOLUTION INTRAVENOUS; SUBCUTANEOUS AS NEEDED
Status: DISCONTINUED | OUTPATIENT
Start: 2018-01-15 | End: 2018-01-15 | Stop reason: HOSPADM

## 2018-01-15 RX ORDER — OXYCODONE HYDROCHLORIDE 5 MG/1
5 TABLET ORAL
Status: DISCONTINUED | OUTPATIENT
Start: 2018-01-15 | End: 2018-01-15 | Stop reason: HOSPADM

## 2018-01-15 RX ORDER — PROTAMINE SULFATE 10 MG/ML
INJECTION, SOLUTION INTRAVENOUS AS NEEDED
Status: DISCONTINUED | OUTPATIENT
Start: 2018-01-15 | End: 2018-01-15 | Stop reason: HOSPADM

## 2018-01-15 RX ORDER — SODIUM CHLORIDE, SODIUM LACTATE, POTASSIUM CHLORIDE, CALCIUM CHLORIDE 600; 310; 30; 20 MG/100ML; MG/100ML; MG/100ML; MG/100ML
100 INJECTION, SOLUTION INTRAVENOUS CONTINUOUS
Status: DISCONTINUED | OUTPATIENT
Start: 2018-01-15 | End: 2018-01-15 | Stop reason: HOSPADM

## 2018-01-15 RX ORDER — ONDANSETRON 2 MG/ML
4 INJECTION INTRAMUSCULAR; INTRAVENOUS
Status: DISCONTINUED | OUTPATIENT
Start: 2018-01-15 | End: 2018-01-15 | Stop reason: HOSPADM

## 2018-01-15 RX ORDER — MIDAZOLAM HYDROCHLORIDE 1 MG/ML
2 INJECTION, SOLUTION INTRAMUSCULAR; INTRAVENOUS
Status: DISCONTINUED | OUTPATIENT
Start: 2018-01-15 | End: 2018-01-15 | Stop reason: HOSPADM

## 2018-01-15 RX ORDER — HYDROMORPHONE HYDROCHLORIDE 2 MG/ML
0.5 INJECTION, SOLUTION INTRAMUSCULAR; INTRAVENOUS; SUBCUTANEOUS
Status: COMPLETED | OUTPATIENT
Start: 2018-01-15 | End: 2018-01-15

## 2018-01-15 RX ORDER — ONDANSETRON 2 MG/ML
INJECTION INTRAMUSCULAR; INTRAVENOUS AS NEEDED
Status: DISCONTINUED | OUTPATIENT
Start: 2018-01-15 | End: 2018-01-15 | Stop reason: HOSPADM

## 2018-01-15 RX ORDER — SODIUM CHLORIDE 9 MG/ML
100 INJECTION, SOLUTION INTRAVENOUS CONTINUOUS
Status: DISCONTINUED | OUTPATIENT
Start: 2018-01-15 | End: 2018-01-15 | Stop reason: HOSPADM

## 2018-01-15 RX ORDER — IODIXANOL 320 MG/ML
INJECTION, SOLUTION INTRAVASCULAR AS NEEDED
Status: DISCONTINUED | OUTPATIENT
Start: 2018-01-15 | End: 2018-01-15 | Stop reason: HOSPADM

## 2018-01-15 RX ORDER — LIDOCAINE HYDROCHLORIDE 10 MG/ML
INJECTION INFILTRATION; PERINEURAL AS NEEDED
Status: DISCONTINUED | OUTPATIENT
Start: 2018-01-15 | End: 2018-01-15 | Stop reason: HOSPADM

## 2018-01-15 RX ORDER — ACETAMINOPHEN 325 MG/1
650 TABLET ORAL
Status: DISCONTINUED | OUTPATIENT
Start: 2018-01-15 | End: 2018-01-15 | Stop reason: HOSPADM

## 2018-01-15 RX ORDER — DIPHENHYDRAMINE HCL 25 MG
25 CAPSULE ORAL
Status: DISCONTINUED | OUTPATIENT
Start: 2018-01-15 | End: 2018-01-15 | Stop reason: HOSPADM

## 2018-01-15 RX ORDER — LIDOCAINE HYDROCHLORIDE 20 MG/ML
INJECTION, SOLUTION EPIDURAL; INFILTRATION; INTRACAUDAL; PERINEURAL AS NEEDED
Status: DISCONTINUED | OUTPATIENT
Start: 2018-01-15 | End: 2018-01-15 | Stop reason: HOSPADM

## 2018-01-15 RX ORDER — HYDROMORPHONE HYDROCHLORIDE 2 MG/ML
0.5 INJECTION, SOLUTION INTRAMUSCULAR; INTRAVENOUS; SUBCUTANEOUS
Status: DISCONTINUED | OUTPATIENT
Start: 2018-01-15 | End: 2018-01-15 | Stop reason: HOSPADM

## 2018-01-15 RX ORDER — LIDOCAINE HYDROCHLORIDE 10 MG/ML
0.1 INJECTION INFILTRATION; PERINEURAL AS NEEDED
Status: DISCONTINUED | OUTPATIENT
Start: 2018-01-15 | End: 2018-01-15 | Stop reason: HOSPADM

## 2018-01-15 RX ORDER — PROPOFOL 10 MG/ML
INJECTION, EMULSION INTRAVENOUS AS NEEDED
Status: DISCONTINUED | OUTPATIENT
Start: 2018-01-15 | End: 2018-01-15 | Stop reason: HOSPADM

## 2018-01-15 RX ORDER — EPHEDRINE SULFATE 50 MG/ML
INJECTION, SOLUTION INTRAVENOUS AS NEEDED
Status: DISCONTINUED | OUTPATIENT
Start: 2018-01-15 | End: 2018-01-15 | Stop reason: HOSPADM

## 2018-01-15 RX ORDER — DEXAMETHASONE SODIUM PHOSPHATE 4 MG/ML
INJECTION, SOLUTION INTRA-ARTICULAR; INTRALESIONAL; INTRAMUSCULAR; INTRAVENOUS; SOFT TISSUE AS NEEDED
Status: DISCONTINUED | OUTPATIENT
Start: 2018-01-15 | End: 2018-01-15 | Stop reason: HOSPADM

## 2018-01-15 RX ORDER — CEFAZOLIN SODIUM/WATER 2 G/20 ML
2 SYRINGE (ML) INTRAVENOUS ONCE
Status: COMPLETED | OUTPATIENT
Start: 2018-01-15 | End: 2018-01-15

## 2018-01-15 RX ORDER — MIDAZOLAM HYDROCHLORIDE 1 MG/ML
2 INJECTION, SOLUTION INTRAMUSCULAR; INTRAVENOUS ONCE
Status: COMPLETED | OUTPATIENT
Start: 2018-01-15 | End: 2018-01-15

## 2018-01-15 RX ADMIN — HYDROMORPHONE HYDROCHLORIDE 0.5 MG: 2 INJECTION, SOLUTION INTRAMUSCULAR; INTRAVENOUS; SUBCUTANEOUS at 14:28

## 2018-01-15 RX ADMIN — PROTAMINE SULFATE 10 MG: 10 INJECTION, SOLUTION INTRAVENOUS at 14:01

## 2018-01-15 RX ADMIN — PROTAMINE SULFATE 10 MG: 10 INJECTION, SOLUTION INTRAVENOUS at 13:59

## 2018-01-15 RX ADMIN — FENTANYL CITRATE 25 MCG: 50 INJECTION, SOLUTION INTRAMUSCULAR; INTRAVENOUS at 13:45

## 2018-01-15 RX ADMIN — PROTAMINE SULFATE 10 MG: 10 INJECTION, SOLUTION INTRAVENOUS at 14:02

## 2018-01-15 RX ADMIN — EPHEDRINE SULFATE 10 MG: 50 INJECTION, SOLUTION INTRAVENOUS at 12:53

## 2018-01-15 RX ADMIN — EPHEDRINE SULFATE 10 MG: 50 INJECTION, SOLUTION INTRAVENOUS at 12:35

## 2018-01-15 RX ADMIN — HEPARIN SODIUM 10000 UNITS: 1000 INJECTION, SOLUTION INTRAVENOUS; SUBCUTANEOUS at 12:49

## 2018-01-15 RX ADMIN — FENTANYL CITRATE 25 MCG: 50 INJECTION, SOLUTION INTRAMUSCULAR; INTRAVENOUS at 12:31

## 2018-01-15 RX ADMIN — FENTANYL CITRATE 25 MCG: 50 INJECTION, SOLUTION INTRAMUSCULAR; INTRAVENOUS at 14:10

## 2018-01-15 RX ADMIN — SODIUM CHLORIDE, SODIUM LACTATE, POTASSIUM CHLORIDE, AND CALCIUM CHLORIDE: 600; 310; 30; 20 INJECTION, SOLUTION INTRAVENOUS at 12:30

## 2018-01-15 RX ADMIN — HYDROMORPHONE HYDROCHLORIDE 0.5 MG: 2 INJECTION, SOLUTION INTRAMUSCULAR; INTRAVENOUS; SUBCUTANEOUS at 14:43

## 2018-01-15 RX ADMIN — Medication 2 G: at 12:04

## 2018-01-15 RX ADMIN — PROPOFOL 200 MG: 10 INJECTION, EMULSION INTRAVENOUS at 11:59

## 2018-01-15 RX ADMIN — FENTANYL CITRATE 25 MCG: 50 INJECTION, SOLUTION INTRAMUSCULAR; INTRAVENOUS at 13:41

## 2018-01-15 RX ADMIN — FENTANYL CITRATE 25 MCG: 50 INJECTION, SOLUTION INTRAMUSCULAR; INTRAVENOUS at 12:05

## 2018-01-15 RX ADMIN — EPHEDRINE SULFATE 10 MG: 50 INJECTION, SOLUTION INTRAVENOUS at 12:23

## 2018-01-15 RX ADMIN — MIDAZOLAM 2 MG: 1 INJECTION INTRAMUSCULAR; INTRAVENOUS at 09:41

## 2018-01-15 RX ADMIN — HYDROMORPHONE HYDROCHLORIDE 0.5 MG: 2 INJECTION, SOLUTION INTRAMUSCULAR; INTRAVENOUS; SUBCUTANEOUS at 15:15

## 2018-01-15 RX ADMIN — HYDROMORPHONE HYDROCHLORIDE 0.5 MG: 2 INJECTION, SOLUTION INTRAMUSCULAR; INTRAVENOUS; SUBCUTANEOUS at 10:23

## 2018-01-15 RX ADMIN — ONDANSETRON 4 MG: 2 INJECTION INTRAMUSCULAR; INTRAVENOUS at 13:55

## 2018-01-15 RX ADMIN — FENTANYL CITRATE 50 MCG: 50 INJECTION, SOLUTION INTRAMUSCULAR; INTRAVENOUS at 13:27

## 2018-01-15 RX ADMIN — LIDOCAINE HYDROCHLORIDE 100 MG: 20 INJECTION, SOLUTION EPIDURAL; INFILTRATION; INTRACAUDAL; PERINEURAL at 11:59

## 2018-01-15 RX ADMIN — FENTANYL CITRATE 25 MCG: 50 INJECTION, SOLUTION INTRAMUSCULAR; INTRAVENOUS at 13:32

## 2018-01-15 RX ADMIN — HYDROMORPHONE HYDROCHLORIDE 0.5 MG: 2 INJECTION, SOLUTION INTRAMUSCULAR; INTRAVENOUS; SUBCUTANEOUS at 14:56

## 2018-01-15 RX ADMIN — DEXAMETHASONE SODIUM PHOSPHATE 4 MG: 4 INJECTION, SOLUTION INTRA-ARTICULAR; INTRALESIONAL; INTRAMUSCULAR; INTRAVENOUS; SOFT TISSUE at 12:14

## 2018-01-15 RX ADMIN — PROTAMINE SULFATE 10 MG: 10 INJECTION, SOLUTION INTRAVENOUS at 14:00

## 2018-01-15 RX ADMIN — SODIUM CHLORIDE, SODIUM LACTATE, POTASSIUM CHLORIDE, AND CALCIUM CHLORIDE 100 ML/HR: 600; 310; 30; 20 INJECTION, SOLUTION INTRAVENOUS at 09:19

## 2018-01-15 NOTE — OP NOTES
Myles [address]  MR#: [id number]  : 1965  ACCOUNT #: [de-identified]   DATE OF SERVICE: 01/15/2018    PREOPERATIVE DIAGNOSIS:  Left lower extremity peripheral arterial disease with claudication and possible atheroembolism. POSTOPERATIVE DIAGNOSIS:  Left lower extremity peripheral arterial disease with claudication and possible atheroembolism. PROCEDURE:  Left superficial femoral artery balloon angioplasty, stent and atherectomy and left popliteal artery balloon angioplasty and stent intravascular or ultrasound-guided access of right common femoral artery and closure device, right common femoral artery. SURGEON:  Dr. Ilana Pineda. ANESTHESIA:  General.    ESTIMATED BLOOD LOSS:  Less than 10 mL. COMPLICATIONS:  None. SPECIMEN:  None. FLUOROSCOPY TIME:  11 minutes 54 seconds. CONTRAST DOSAGE:  70 mL of Visipaque. IMPLANTS:  stent    STATEMENT OF MEDICAL NECESSITY:  The patient is a 53-year-old smoker with significant peripheral arterial disease who presented to the office with significant and lifestyle-limiting, possibly disabling pain of the left lower extremity. She also has ischemic changes of the toes that are highly suggestive of atheroembolism. A CT angiogram was performed in [facility] and we were able to obtain the images for review and the report. There is a segmental occlusion of the distal left superficial femoral artery as well as a focal stenosis in the mid left popliteal artery. PROCEDURE:  The patient was taken to the hybrid operating room 12 and general anesthesia was provided. The groins were prepped and draped in the usual sterile fashion. Right common femoral artery was identified with a combination of fluoroscopic marking of the right femoral head and direct ultrasound guidance.   The overlying skin was anesthetized with 2% Xylocaine and using direct ultrasound guidance and micropuncture technique, the right common femoral artery was cannulated with a micropuncture needle. The micropuncture wire was advanced under fluoroscopic guidance into the distal aorta and then a micropuncture catheter was placed through which an 0.035 Bentson wire was placed and positioned to the level of the proximal abdominal aorta. A 6-Canadian Hunter sheath was then placed over the wire and positioned to the level of the right common iliac artery. A 5-Canadian Omniflush catheter was then used to select the left common iliac artery and an 0.035 Glidewire was advanced under fluoroscopic guidance into the left superficial femoral artery. The catheter was removed and a straight 5-Canadian glide catheter was placed over the wire and also advanced into the left superficial femoral artery under fluoroscopic guidance and then the sheath was advanced over the catheter and positioned to the proximal left superficial femoral artery. The sheath was flushed in the usual standard fashion and then arteriographic images of the leg were performed through the sheath. The occlusion was confirmed at the distal left superficial femoral artery. Prior to attempts at crossing, the patient was fully anticoagulated with 100 units of heparin per kilogram body weight intravenously. The left superficial femoral artery occlusion was then crossed with a 5-Canadian Quick-Cross catheter and an 0.035 Glidewire. Intraluminal positioning of the catheter distally was confirmed angiographically. An 0.018 Viper wire was then placed through the catheter and the catheter was removed. Next, a Phoenix atherectomy device with a 2.2 mm head was then positioned and multiple passages were used to treat the occluded segment of the left superficial femoral artery. Followup imaging was then performed showing restoration of patency, but still over 90% stenosis.     This lesion was then dilated with a 4 mm x 8 cm long balloon angioplasty catheter with suboptimal results with still greater than 75% stenosis. The left superficial femoral artery was then stented with a 6 mm diameter x 8 cm long self-expanding Payton Lolling PTX stent and then this was dilated sequentially with a 5 mm and then a 6 x 80 mm balloon angioplasty catheter with good angiographic results. The focal irregular lesion at the mid popliteal artery approximately at the level of the mid patella also was of concern and this had approximately 70% stenosis so this was treated as well. This was primarily stented with a 7 mm x 3 cm Absolute Pro self-expanding stent and then this was dilated with a 6 mm x 2 cm balloon with two different positions of inflations. Followup imaging was then performed showing an excellent angiographic result and no evidence of tibial embolization. The right femoral bifurcation was then imaged arteriographically through the sheath after it was pulled back to the right side and then a ProGlide closure device was placed in the usual fashion. Protamine was then reversed with heparin after placement of the closure device where there was good hemostasis. ANGIOGRAPHIC FINDINGS:  1. The proximal left superficial and mid left superficial femoral arteries are widely patent. 2.  At the adductor hiatus, there is an occlusion extending approximately 4 to 4.5 cm and some distal irregularity of the popliteal artery below this. There is also a 70% focal very irregular-appearing plaque with stenosis at the mid left popliteal artery approximately at the level of the mid patella. There is good distal 3 vessel runoff. 3.  Following balloon angioplasty and stenting and atherectomy as described above, the final result is excellent with no residual stenosis in the left superficial femoral artery occluded segment which is now widely patent. 4.  The left popliteal artery after balloon angioplasty and stenting has no residual stenosis and no evidence of embolization.     FINAL IMPRESSION:  Segmental occlusion of distal left superficial femoral artery successfully treated with atherectomy, balloon angioplasty and stenting. Focally irregular high grade stenosis of mid left popliteal artery successfully treated with primary stenting and balloon angioplasty. Good 3-vessel runoff.       MD Margie Colvin / [place]  D: 01/15/2018 14:48     T: 01/15/2018 15:39  JOB #: [id number]

## 2018-01-15 NOTE — PERIOP NOTES
PT and family verbalized understanding of discharge paperwork including reasons to call MD, s/s of infection, diet, follow up, activity, medication (time to take next dose, signs of respiratory depression and interaction with home medication) and wound care. Denies questions     Groin site soft and non tender after pt ambulated.

## 2018-01-15 NOTE — H&P
Referring physician: Rika Good MD  [address] [facility],[address] Floor for Adult and [place], [address] [place] [address] Avenue     Reason for referral: Left foot and leg pain, PAD.     Nadine Arambula is a 50 y.o. female sent in consultation for       Chief Complaint   Patient presents with    Claudication         HPI: 53-year-old former smoker with extensive vascular history is sent for evaluation of a 2 week history of pain in the left lower extremity. Her symptoms are somewhat difficult to ascertain given her history, however it is clear that she is having discomfort and pain in the left lower extremity. The pain is at times diffusely located throughout the left lower extremity and at times only involving the lower leg and calf. She has numbness in the left foot but minimal pain. She has a history of previous coronary artery bypass grafting and bilateral renal stenting. CTA was recently done at [facility]. We do not have the images but we were able to obtain the report. The report indicates bilateral renal artery stenting with occlusion of the left renal stent. The infrarenal aorta has a significant stenosis. There is a 4 cm long segmental occlusion of the distal left superficial femoral artery. We have requested to have these images pushed over into our system.     Lower extremity arterial duplex scanning was done recently in our radiology department. This indicates short segment occlusion of the left superficial femoral artery but there is no mention of problems with the aorta. The ankle-brachial index is 1.13 on the right and 0.7 on the left. Toe pressures however are not measurable and are rated 0 and the left foot. This was checked again today as this seems discordant with the duplex findings. On our study the left superficial femoral artery distal occlusion is confirmed with monophasic flow in the tibial vessels.   There also appears to be greater than 50% stenosis in the right proximal superficial femoral artery. The ankle-brachial index is 1.11 on the right and 0.67 on the left. This is consistent with the previous study. However the great toe pressure while 97 on the right is 0 on the left with flat waveforms.     Patient sees Dr. Chelsea Hernandez of [place] [facility]. She is on aspirin and Effient. She says her last coronary stenting was about 2 years ago. She denies chest pain or shortness of breath.     A limited ultrasound was repeated today in the office to also look at her aorta after we obtain the study from [facility]. I was present while this is being done in the infrarenal aorta is widely patent and there does not appear to be a stenosis.     ROS: Comprehensive ROS is negative except as noted in HPI.     Medical history:        Past Medical History:   Diagnosis Date    Arthritis      CAD (coronary artery disease) CABG 2009     stented x 1 spring of 2015    Chronic back pain states cannot lie flat due to back pain    Chronic kidney disease      Chronic obstructive pulmonary disease (HCC)      Chronic pain      Coagulation defect ([address]       effient- did not hold prior to procedure . office and Dr. Timur Ball aware    Depression      GERD (gastroesophageal reflux disease)      Hypercholesterolemia      Hypertension      Joint ache      Memory impairment      MI, old x 2     2009    Obesity (BMI 30-39. 9)       36.6    Persistent insomnia       S/P CABG x 4 2009    Smoker       36 yr hx/ 1 pk per day.      Stented coronary artery       EF 55-60% echo 12/15         Surgical history:         Past Surgical History:   Procedure Laterality Date    HX  SECTION         x3    HX CHOLECYSTECTOMY        HX CORONARY ARTERY BYPASS GRAFT   2009     x 4 grafts    HX CORONARY STENT PLACEMENT   2015     x1 spring    HX HYSTERECTOMY        HX ORTHOPAEDIC        left knee arthroscopy     HX OTHER SURGICAL   2010     renal stents     HX SALPINGO-OOPHORECTOMY             Allergies: No Known Allergies     Current medications:         Current Outpatient Prescriptions   Medication Sig Dispense    clonazePAM (KLONOPIN) 1 mg tablet Take 1 Tab by mouth daily as needed. 30 Tab    HYDROcodone-acetaminophen (NORCO) 7.5-325 mg per tablet Take 1 Tab by mouth every six (6) hours as needed for Pain. Max Daily Amount: 4 Tabs. 120 Tab    levothyroxine (SYNTHROID) 25 mcg tablet Take 1 Tab by mouth Daily (before breakfast). 90 Tab    omeprazole (PRILOSEC) 20 mg capsule TAKE 1 CAPSULE BY MOUTH DAILY. INDICATIONS: GASTROESOPHAGEAL REFLUX, HEARTBURN 30 Cap    Armodafinil (NUVIGIL) 250 mg tab tablet Take 1 Tab by mouth Daily (before breakfast). Max Daily Amount: 250 mg. 30 Tab    furosemide (LASIX) 40 mg tablet TAKE 1 TABLET BY MOUTH EVERY DAY 30 Tab    clobetasol (TEMOVATE) 0.05 % topical cream Apply  to affected area two (2) times a day. (Patient taking differently: Apply  to affected area two (2) times daily as needed.) 60 g    aspirin delayed-release 81 mg tablet Take 81 mg by mouth.      ranolazine ER (RANEXA) 500 mg SR tablet Take 500 mg by mouth.      amLODIPine (NORVASC) 10 mg tablet TAKE 1 TABLET (10 MG) BY MOUTH DAILY.      albuterol (PROVENTIL HFA, VENTOLIN HFA, PROAIR HFA) 90 mcg/actuation inhaler Take 2 Puffs by inhalation every six (6) hours as needed for Wheezing or Shortness of Breath. 1 Inhaler    budesonide-formoterol (SYMBICORT) 160-4.5 mcg/actuation HFA inhaler Take 2 Puffs by inhalation two (2) times a day. 1 Inhaler    olmesartan (BENICAR) 40 mg tablet Take 1 Tab by mouth every evening. 30 Tab    atorvastatin (LIPITOR) 80 mg tablet Take 80 mg by mouth.      carvedilol (COREG) 12.5 mg tablet Take 12.5 mg by mouth two (2) times a day.      isosorbide mononitrate ER (IMDUR) 60 mg CR tablet Take 60 mg by mouth two (2) times a day.      nitroglycerin (NITROSTAT) 0.4 mg SL tablet 1 Tab by SubLINGual route every five (5) minutes as needed.  (Patient taking differently: 0.4 mg by SubLINGual route every five (5) minutes as needed. Indications: bring on the dos) 25 Tab    prasugrel (EFFIENT) 10 mg tablet Take 10 mg by mouth every morning. Indications: did not hold      multivitamin (ONE A DAY) tablet Take 1 Tab by mouth every morning. Indications: did not hold        No current facility-administered medications for this visit.          Social history:        History   Smoking Status    Former Smoker    Packs/day: 1.00    Years: 30.00    Types: Cigarettes    Quit date: 10/9/2016   Smokeless Tobacco    Never Used                History   Alcohol use Not on file       Comment:            Imaging: CTA from [facility] report reviewed. Lower extremity arterial from radiology department report and images reviewed and described above. Limited repeat arterial study as described above in the office today.     Vitals:       Vitals:     01/08/18 0843   BP: 127/83   Pulse: 82   Resp: 22   SpO2: 99%   Weight: 226 lb (102.5 kg)   Height: 5' 6\" (1.676 m)         Physical exam:       Visit Vitals    /83    Pulse 82    Resp 22    Ht 5' 6\" (1.676 m)    Wt 226 lb (102.5 kg)    SpO2 99%    BMI 36.48 kg/m2      General appearance: alert, cooperative, no distress, appears stated age, moderately obese  Head: Normocephalic, without obvious abnormality, atraumatic  Lungs: clear to auscultation bilaterally  Heart: regular rate and rhythm, S1, S2 normal, no murmur, click, rub or gallop  Abdomen: soft, non-tender. Bowel sounds normal. No masses,  no organomegaly  Extremities: extremities normal, atraumatic, no cyanosis or edema -except the toes where there are some cyanotic changes bilaterally. There are no ulcerations present. Pulses: There are good Doppler signals in the right foot but diminished monophasic Doppler signals in the left dorsalis pedis and posterior tibial arteries but the signals are clearly present.   Skin: Skin color, texture, turgor normal. No rashes or lesions  Neurologic: Grossly normal    Imaging: CTA images from [address] obtained and reviewed, as well as the report. There is a 4 cm-long occlusion of the distal left SFA. There is no aortoiliac PAD, no aortic stenosis.     Impression:       ICD-10-CM ICD-9-CM     1. Femoral artery occlusion, left (HCC) I74.3 444.22 DUPLEX LOWER EXT ARTERY LTD W/ RICCI (27975/86111)   2. Leg pain, left M79.605 729.5 DUPLEX LOWER EXT ARTERY LTD W/ RICCI (47403/77828)   3. History of stent insertion of renal artery Z98.890 V45.89           Plan: Left leg arteriogram and intervention. The need for the procedure, its risks, and alternatives were discussed recently in the office. She has some symptoms that are not entirely consistent with her level of PAD (eg - pain in upper left thigh), and I explained that not all of these symptoms would be expected to improve after the procedure, and that other, additional conditions might be present. She understands all of this and her questions were answered, and she agrees to proceed.

## 2018-01-15 NOTE — ANESTHESIA POSTPROCEDURE EVALUATION
Post-Anesthesia Evaluation and Assessment    Patient: Brea Goetz MRN: [id number]  SSN: [id number]    YOB: 1965  Age: 50 y.o. Sex: female       Cardiovascular Function/Vital Signs  Visit Vitals    /83    Pulse 84    Temp 37 °C (98.6 °F)    Resp 13    Ht 5' 7\" (1.702 m)    Wt 102.1 kg (225 lb)    SpO2 92%    BMI 35.24 kg/m2     Bedside sat 95%. Patient is status post general anesthesia for Procedure(s):  LEFT LEG ARTERIOGRAM, LEFT SFA  ANGIOPLASTY, LEFT SFA  ARTHROECTOMY STENT PTA, LEFT POP STENT PTA, ULTRASOUND VASCULAR ACCESS, CLOSURE DEVICE. Nausea/Vomiting: None    Postoperative hydration reviewed and adequate. Pain:  Pain Scale 1: Numeric (0 - 10) (01/15/18 1455)  Pain Intensity 1: 8 (01/15/18 1455)   Managed    Neurological Status:   Neuro (WDL): Within Defined Limits (01/15/18 0905)  Neuro  Neurologic State: Drowsy; Eyes open to voice (01/15/18 1420)  Orientation Level: Oriented X4 (01/15/18 1420)  Cognition: Follows commands (01/15/18 1420)  Speech: Clear (01/15/18 1420)  LUE Motor Response: Purposeful (01/15/18 1420)  LLE Motor Response: Purposeful (01/15/18 1420)  RUE Motor Response: Purposeful (01/15/18 1420)  RLE Motor Response: Purposeful (01/15/18 1420)   At baseline    Mental Status and Level of Consciousness: Awake. Pulmonary Status:   O2 Device: Nasal cannula (01/15/18 1420)   Adequate oxygenation and airway patent    Complications related to anesthesia: None    Post-anesthesia assessment completed.  No concerns    Signed By: Jesusita Nelson MD     January 15, 2018

## 2018-01-15 NOTE — ANESTHESIA PREPROCEDURE EVALUATION
Anesthetic History               Review of Systems / Medical History  Patient summary reviewed, nursing notes reviewed and pertinent labs reviewed    Pulmonary    COPD: mild      Undiagnosed apnea and smoker         Neuro/Psych         Psychiatric history     Cardiovascular    Hypertension: well controlled          Past MI (2009), CAD, PAD, cardiac stents (4/15) and CABG (2009)    Exercise tolerance: <4 METS  Comments: Stent in 4/2015   GI/Hepatic/Renal     GERD: well controlled           Endo/Other      Hypothyroidism  Obesity and arthritis     Other Findings            Physical Exam    Airway  Mallampati: II  TM Distance: 4 - 6 cm  Neck ROM: normal range of motion   Mouth opening: Normal     Cardiovascular    Rhythm: regular  Rate: normal         Dental    Dentition: Full upper dentures     Pulmonary  Breath sounds clear to auscultation               Abdominal         Other Findings            Anesthetic Plan    ASA: 3  Anesthesia type: general          Induction: Intravenous  Anesthetic plan and risks discussed with: Patient and Spouse

## 2018-01-15 NOTE — DISCHARGE INSTRUCTIONS
ANGIOGRAPHY DISCHARGE INSTRUCTIONS    1. Check puncture site frequently for swelling or bleeding. If there is any bleeding, lie down and apply pressure over the area with a clean towel or washcloth. Notify your doctor for any redness, swelling, drainage, or oozing from the puncture site. Notify your doctor for any fever or chills. 2. If the extremity becomes cold, numb, or painful call Dr. Teresa Thomas at [phone].  3. Activity should be limited for the next 48 hours. Climb stairs as little as possible and avoid any stooping, bending, or strenuous activity for 48 hours. No heavy lifting (anything over 10 pounds) for 3 days. 4. You may resume your usual diet. Drink more fluids than usual.  5. Have a responsible person drive you home and stay with you for at least 24 hours after your heart catheterization/angiography. 6. You may remove bandage from your Right and Groin in 48 hours. You may shower in 24 hours. No tub baths, hot tubs, or swimming for 1 week. Do not place any lotions, creams, powders, or ointments over puncture site for 1 week. You may place a clean band-aid over the puncture site each day for 5 days. Change daily. I have read the above instructions and have had the opportunity to ask questions. After general anesthesia or intravenous sedation, for 24 hours or while taking prescription Narcotics:  · Limit your activities  · Do not drive and operate hazardous machinery  · Do not make important personal or business decisions  · Do  not drink alcoholic beverages  · If you have not urinated within 8 hours after discharge, please contact your surgeon on call. *  Please give a list of your current medications to your Primary Care Provider. *  Please update this list whenever your medications are discontinued, doses are      changed, or new medications (including over-the-counter products) are added. *  Please carry medication information at all times in case of emergency situations.     These are general instructions for a healthy lifestyle:  No smoking/ No tobacco products/ Avoid exposure to second hand smoke  Surgeon General's Warning:  Quitting smoking now greatly reduces serious risk to your health. Obesity, smoking, and sedentary lifestyle greatly increases your risk for illness  A healthy diet, regular physical exercise & weight monitoring are important for maintaining a healthy lifestyle    You may be retaining fluid if you have a history of heart failure or if you experience any of the following symptoms:  Weight gain of 3 pounds or more overnight or 5 pounds in a week, increased swelling in our hands or feet or shortness of breath while lying flat in bed. Please call your doctor as soon as you notice any of these symptoms; do not wait until your next office visit. Recognize signs and symptoms of STROKE:  F-face looks uneven  A-arms unable to move or move unevenly  S-speech slurred or non-existent  T-time-call 911 as soon as signs and symptoms begin-DO NOT go       Back to bed or wait to see if you get better-TIME IS BRAIN.

## 2018-01-15 NOTE — IP AVS SNAPSHOT
Candy Gonzalez 
 
 
 [address] [facility] [address] 
[phone] Patient: Dejan Corey MRN: [id number] :1965 About your hospitalization You were admitted on:  January 15, 2018 You last received care in the:  [facility] PACU You were discharged on:  January 15, 2018 Why you were hospitalized Your primary diagnosis was:  Not on File Follow-up Information Follow up With Details Comments Contact Info Petr Linares MD Follow up on 2018 10:15 AM [place] [address] [facility] [address] 
[phone] Your Scheduled Appointments 2018 10:15 AM EST Global Post Op with Petr Linares MD  
[facility] ([facility]) [address] [place] [id number] [phone] Discharge Orders None A check sandi indicates which time of day the medication should be taken. My Medications CHANGE how you take these medications Instructions Each Dose to Equal  
 Morning Noon Evening Bedtime  
 clobetasol 0.05 % topical cream  
Commonly known as:  Shaquille Felipe What changed:   
- when to take this 
- reasons to take this Your last dose was: Your next dose is:    
   
   
 Apply  to affected area two (2) times a day. CONTINUE taking these medications Instructions Each Dose to Equal  
 Morning Noon Evening Bedtime  
 albuterol 90 mcg/actuation inhaler Commonly known as:  PROVENTIL HFA, VENTOLIN HFA, PROAIR HFA Your last dose was: Your next dose is: Take 2 Puffs by inhalation every six (6) hours as needed for Wheezing or Shortness of Breath. 2 Puff  
    
   
   
   
  
 amLODIPine 10 mg tablet Commonly known as:  Claudell Lzizy Your last dose was: Your next dose is: TAKE 1 TABLET (10 MG) BY MOUTH DAILY. Armodafinil 250 mg Tab tablet Commonly known as:  Deep Doty Your last dose was: Your next dose is: Take 1 Tab by mouth Daily (before breakfast). Max Daily Amount: 250 mg.  
 250 mg  
    
   
   
   
  
 aspirin delayed-release 81 mg tablet Your last dose was: Your next dose is: Take 81 mg by mouth daily. 81 mg  
    
   
   
   
  
 atorvastatin 80 mg tablet Commonly known as:  LIPITOR Your last dose was: Your next dose is: Take 80 mg by mouth nightly. 80 mg  
    
   
   
   
  
 budesonide-formoterol 160-4.5 mcg/actuation Hfaa Commonly known as:  SYMBICORT Your last dose was: Your next dose is: Take 2 Puffs by inhalation two (2) times a day. 2 Puff  
    
   
   
   
  
 carvedilol 12.5 mg tablet Commonly known as:  Emre Jeanie Your last dose was: Your next dose is: Take 12.5 mg by mouth two (2) times a day. 12.5 mg  
    
   
   
   
  
 clonazePAM 1 mg tablet Commonly known as:  John Liberty Your last dose was: Your next dose is: Take 1 Tab by mouth daily as needed. 1 mg EFFIENT 10 mg tablet Generic drug:  prasugrel Your last dose was: Your next dose is: Take 10 mg by mouth daily. 10 mg  
    
   
   
   
  
 furosemide 40 mg tablet Commonly known as:  LASIX Your last dose was: Your next dose is: TAKE 1 TABLET BY MOUTH EVERY DAY  
     
   
   
   
  
 HYDROcodone-acetaminophen 7.5-325 mg per tablet Commonly known as:  Susannah Porter Your last dose was: Your next dose is: Take 1 Tab by mouth every six (6) hours as needed for Pain. Max Daily Amount: 4 Tabs. 1 Tab  
    
   
   
   
  
 isosorbide mononitrate ER 60 mg CR tablet Commonly known as:  IMDUR  
   
 Your last dose was: Your next dose is: Take 60 mg by mouth two (2) times a day. 60 mg  
    
   
   
   
  
 levothyroxine 25 mcg tablet Commonly known as:  SYNTHROID Your last dose was: Your next dose is: Take 1 Tab by mouth Daily (before breakfast). 25 mcg  
    
   
   
   
  
 meclizine 25 mg tablet Commonly known as:  ANTIVERT Your last dose was: Your next dose is: One tab three times a day as needed for vertigo  
     
   
   
   
  
 multivitamin tablet Commonly known as:  ONE A DAY Your last dose was: Your next dose is: Take 1 Tab by mouth daily. Stopped 1/12/18  Indications: did not hold 1 Tab  
    
   
   
   
  
 nitroglycerin 0.4 mg SL tablet Commonly known as:  NITROSTAT Your last dose was: Your next dose is:    
   
   
 1 Tab by SubLINGual route every five (5) minutes as needed. 0.4 mg  
    
   
   
   
  
 olmesartan 40 mg tablet Commonly known as:  [employer] Your last dose was: Your next dose is: Take 1 Tab by mouth every evening. 40 mg  
    
   
   
   
  
 omeprazole 20 mg capsule Commonly known as:  PRILOSEC Your last dose was: Your next dose is: TAKE 1 CAPSULE BY MOUTH DAILY. INDICATIONS: GASTROESOPHAGEAL REFLUX, HEARTBURN  
     
   
   
   
  
 ranolazine  mg SR tablet Commonly known as:  RANEXA Your last dose was: Your next dose is: Take 500 mg by mouth daily. 500 mg Discharge Instructions ANGIOGRAPHY DISCHARGE INSTRUCTIONS 1. Check puncture site frequently for swelling or bleeding. If there is any bleeding, lie down and apply pressure over the area with a clean towel or washcloth. Notify your doctor for any redness, swelling, drainage, or oozing from the puncture site. Notify your doctor for any fever or chills. 2. If the extremity becomes cold, numb, or painful call Dr. Noy Gilbert at [phone]. 
3. Activity should be limited for the next 48 hours. Climb stairs as little as possible and avoid any stooping, bending, or strenuous activity for 48 hours. No heavy lifting (anything over 10 pounds) for 3 days. 4. You may resume your usual diet. Drink more fluids than usual. 
5. Have a responsible person drive you home and stay with you for at least 24 hours after your heart catheterization/angiography. 6. You may remove bandage from your Right and Groin in 48 hours. You may shower in 24 hours. No tub baths, hot tubs, or swimming for 1 week. Do not place any lotions, creams, powders, or ointments over puncture site for 1 week. You may place a clean band-aid over the puncture site each day for 5 days. Change daily. I have read the above instructions and have had the opportunity to ask questions. After general anesthesia or intravenous sedation, for 24 hours or while taking prescription Narcotics: · Limit your activities · Do not drive and operate hazardous machinery · Do not make important personal or business decisions · Do  not drink alcoholic beverages · If you have not urinated within 8 hours after discharge, please contact your surgeon on call. *  Please give a list of your current medications to your Primary Care Provider. *  Please update this list whenever your medications are discontinued, doses are 
    changed, or new medications (including over-the-counter products) are added. *  Please carry medication information at all times in case of emergency situations. These are general instructions for a healthy lifestyle: No smoking/ No tobacco products/ Avoid exposure to second hand smoke Surgeon General's Warning:  Quitting smoking now greatly reduces serious risk to your health. Obesity, smoking, and sedentary lifestyle greatly increases your risk for illness A healthy diet, regular physical exercise & weight monitoring are important for maintaining a healthy lifestyle You may be retaining fluid if you have a history of heart failure or if you experience any of the following symptoms:  Weight gain of 3 pounds or more overnight or 5 pounds in a week, increased swelling in our hands or feet or shortness of breath while lying flat in bed. Please call your doctor as soon as you notice any of these symptoms; do not wait until your next office visit. Recognize signs and symptoms of STROKE: 
F-face looks uneven A-arms unable to move or move unevenly S-speech slurred or non-existent T-time-call 911 as soon as signs and symptoms begin-DO NOT go Back to bed or wait to see if you get better-TIME IS BRAIN. Introducing hospitals & HEALTH SERVICES! Dear Yoly Shi: Thank you for requesting a Netskope account. Our records indicate that you have previously registered for a Netskope account but its currently inactive. Please call our Netsk[employer] support line at [phone]. Additional Information If you have questions, please visit the Frequently Asked Questions section of the [employer]ets[employer] website at [email or web address] Remember, Netskope is NOT to be used for urgent needs. For medical emergencies, dial 911. Now available from your iPhone and Android! Unresulted Labs-Please follow up with your PCP about these lab tests Order Current Status IR ANGIO EXT LOWER LT In process Providers Seen During Your Hospitalization Provider Specialty Primary office phone Filemon Greco MD Vascular Surgery [phone] Your Primary Care Physician (PCP) ** None ** You are allergic to the following No active allergies Recent Documentation Height Weight BMI OB Status Smoking Status 1.702 m 102.1 kg 35.24 kg/m2 Hysterectomy Former Smoker Emergency Contacts Name Discharge Info Relation Home Work Mobile Frederick,Jorge DISCHARGE CAREGIVER [3] Friend [5] [phone] Patient Belongings The following personal items are in your possession at time of discharge: 
  Dental Appliances: Uppers  Visual Aid: Glasses      Home Medications: None   Jewelry: None  Clothing: Footwear, Pants, Shirt, Undergarments    Other Valuables: Eyeglasses Please provide this summary of care documentation to your next provider. Signatures-by signing, you are acknowledging that this After Visit Summary has been reviewed with you and you have received a copy. Patient Signature:  ____________________________________________________________ Date:  ____________________________________________________________  
  
[facility] Provider Signature:  ____________________________________________________________ Date:  ____________________________________________________________

## 2018-01-16 ENCOUNTER — HOSPITAL ENCOUNTER (EMERGENCY)
Age: 53
Discharge: HOME | DRG: 271 | End: 2018-01-16
Attending: EMERGENCY MEDICINE
Payer: MEDICARE

## 2018-01-16 ENCOUNTER — APPOINTMENT (OUTPATIENT)
Dept: INTERVENTIONAL RADIOLOGY/VASCULAR | Age: 53
DRG: 271 | End: 2018-01-16
Attending: SURGERY
Payer: MEDICARE

## 2018-01-16 ENCOUNTER — ANESTHESIA (OUTPATIENT)
Dept: SURGERY | Age: 53
DRG: 271 | End: 2018-01-16
Payer: MEDICARE

## 2018-01-16 ENCOUNTER — ANESTHESIA EVENT (OUTPATIENT)
Dept: SURGERY | Age: 53
DRG: 271 | End: 2018-01-16
Payer: MEDICARE

## 2018-01-16 ENCOUNTER — HOSPITAL ENCOUNTER (INPATIENT)
Age: 53
LOS: 2 days | Discharge: HOME | DRG: 271 | End: 2018-01-18
Attending: SURGERY | Admitting: SURGERY
Payer: MEDICARE

## 2018-01-16 VITALS
OXYGEN SATURATION: 96 % | SYSTOLIC BLOOD PRESSURE: 188 MMHG | DIASTOLIC BLOOD PRESSURE: 95 MMHG | BODY MASS INDEX: 35.31 KG/M2 | HEIGHT: 67 IN | WEIGHT: 225 LBS | RESPIRATION RATE: 17 BRPM | HEART RATE: 90 BPM | TEMPERATURE: 98.5 F

## 2018-01-16 DIAGNOSIS — M79.609 POSTOPERATIVE PAIN OF EXTREMITY: Primary | ICD-10-CM

## 2018-01-16 DIAGNOSIS — I77.1 ARTERIAL STENOSIS (HCC): ICD-10-CM

## 2018-01-16 DIAGNOSIS — G89.18 POSTOPERATIVE PAIN OF EXTREMITY: Primary | ICD-10-CM

## 2018-01-16 PROBLEM — I74.3 THROMBOSIS OF LEFT FEMORAL ARTERY (HCC): Status: ACTIVE | Noted: 2018-01-16

## 2018-01-16 LAB
APTT PPP: 134.9 SEC (ref 23.2–35.3)
BASE EXCESS BLD CALC-SCNC: 3 MMOL/L
CA-I BLD-MCNC: 1.09 MMOL/L (ref 1.12–1.32)
ERYTHROCYTE [DISTWIDTH] IN BLOOD BY AUTOMATED COUNT: 13.3 % (ref 11.9–14.6)
FIBRINOGEN PPP-MCNC: 396 MG/DL (ref 190–501)
GLUCOSE BLD STRIP.AUTO-MCNC: 109 MG/DL (ref 65–100)
HCO3 BLD-SCNC: 27.6 MMOL/L (ref 22–26)
HCT VFR BLD AUTO: 35.2 % (ref 35.8–46.3)
HGB BLD-MCNC: 12.2 G/DL (ref 11.7–15.4)
MCH RBC QN AUTO: 30.9 PG (ref 26.1–32.9)
MCHC RBC AUTO-ENTMCNC: 34.7 G/DL (ref 31.4–35)
MCV RBC AUTO: 89.1 FL (ref 79.6–97.8)
PCO2 BLD: 43 MMHG (ref 35–45)
PH BLD: 7.42 [PH] (ref 7.35–7.45)
PLATELET # BLD AUTO: 239 K/UL (ref 150–450)
PMV BLD AUTO: 10.8 FL (ref 10.8–14.1)
PO2 BLD: 143 MMHG (ref 80–105)
POTASSIUM BLD-SCNC: 3.6 MMOL/L (ref 3.5–5.1)
RBC # BLD AUTO: 3.95 M/UL (ref 4.05–5.25)
SAO2 % BLD: 99 % (ref 95–98)
SODIUM BLD-SCNC: 139 MMOL/L (ref 136–145)
WBC # BLD AUTO: 11.2 K/UL (ref 4.3–11.1)

## 2018-01-16 PROCEDURE — C1757 CATH, THROMBECTOMY/EMBOLECT: HCPCS

## 2018-01-16 PROCEDURE — 74011250636 HC RX REV CODE- 250/636

## 2018-01-16 PROCEDURE — C1769 GUIDE WIRE: HCPCS

## 2018-01-16 PROCEDURE — 37184 PRIM ART M-THRMBC 1ST VSL: CPT

## 2018-01-16 PROCEDURE — 85384 FIBRINOGEN ACTIVITY: CPT | Performed by: SURGERY

## 2018-01-16 PROCEDURE — 74011250636 HC RX REV CODE- 250/636: Performed by: ANESTHESIOLOGY

## 2018-01-16 PROCEDURE — 99284 EMERGENCY DEPT VISIT MOD MDM: CPT | Performed by: EMERGENCY MEDICINE

## 2018-01-16 PROCEDURE — C1887 CATHETER, GUIDING: HCPCS

## 2018-01-16 PROCEDURE — 96372 THER/PROPH/DIAG INJ SC/IM: CPT | Performed by: EMERGENCY MEDICINE

## 2018-01-16 PROCEDURE — 3E05317 INTRODUCTION OF OTHER THROMBOLYTIC INTO PERIPHERAL ARTERY, PERCUTANEOUS APPROACH: ICD-10-PCS | Performed by: SURGERY

## 2018-01-16 PROCEDURE — 85730 THROMBOPLASTIN TIME PARTIAL: CPT | Performed by: SURGERY

## 2018-01-16 PROCEDURE — 74011000250 HC RX REV CODE- 250

## 2018-01-16 PROCEDURE — 77030020782 HC GWN BAIR PAWS FLX 3M -B: Performed by: ANESTHESIOLOGY

## 2018-01-16 PROCEDURE — 85027 COMPLETE CBC AUTOMATED: CPT | Performed by: SURGERY

## 2018-01-16 PROCEDURE — 77030019908 HC STETH ESOPH SIMS -A: Performed by: ANESTHESIOLOGY

## 2018-01-16 PROCEDURE — C1894 INTRO/SHEATH, NON-LASER: HCPCS

## 2018-01-16 PROCEDURE — 94640 AIRWAY INHALATION TREATMENT: CPT

## 2018-01-16 PROCEDURE — 04CN3ZZ EXTIRPATION OF MATTER FROM LEFT POPLITEAL ARTERY, PERCUTANEOUS APPROACH: ICD-10-PCS | Performed by: SURGERY

## 2018-01-16 PROCEDURE — 85049 AUTOMATED PLATELET COUNT: CPT | Performed by: SURGERY

## 2018-01-16 PROCEDURE — 76010000153 HC OR TIME 1.5 TO 2 HR: Performed by: SURGERY

## 2018-01-16 PROCEDURE — 74011250637 HC RX REV CODE- 250/637: Performed by: ANESTHESIOLOGY

## 2018-01-16 PROCEDURE — 36415 COLL VENOUS BLD VENIPUNCTURE: CPT | Performed by: SURGERY

## 2018-01-16 PROCEDURE — 82803 BLOOD GASES ANY COMBINATION: CPT

## 2018-01-16 PROCEDURE — 74011250637 HC RX REV CODE- 250/637: Performed by: SURGERY

## 2018-01-16 PROCEDURE — 74011250636 HC RX REV CODE- 250/636: Performed by: SURGERY

## 2018-01-16 PROCEDURE — 74011250637 HC RX REV CODE- 250/637: Performed by: EMERGENCY MEDICINE

## 2018-01-16 PROCEDURE — 76210000063 HC OR PH I REC FIRST 0.5 HR: Performed by: SURGERY

## 2018-01-16 PROCEDURE — 74011250636 HC RX REV CODE- 250/636: Performed by: EMERGENCY MEDICINE

## 2018-01-16 PROCEDURE — 04CL3ZZ EXTIRPATION OF MATTER FROM LEFT FEMORAL ARTERY, PERCUTANEOUS APPROACH: ICD-10-PCS | Performed by: SURGERY

## 2018-01-16 PROCEDURE — 74011000250 HC RX REV CODE- 250: Performed by: SURGERY

## 2018-01-16 PROCEDURE — 85610 PROTHROMBIN TIME: CPT | Performed by: SURGERY

## 2018-01-16 PROCEDURE — B41G1ZZ FLUOROSCOPY OF LEFT LOWER EXTREMITY ARTERIES USING LOW OSMOLAR CONTRAST: ICD-10-PCS | Performed by: SURGERY

## 2018-01-16 PROCEDURE — 84295 ASSAY OF SERUM SODIUM: CPT

## 2018-01-16 PROCEDURE — 85018 HEMOGLOBIN: CPT | Performed by: SURGERY

## 2018-01-16 PROCEDURE — 76060000034 HC ANESTHESIA 1.5 TO 2 HR: Performed by: SURGERY

## 2018-01-16 PROCEDURE — 77030008477 HC STYL SATN SLP COVD -A: Performed by: ANESTHESIOLOGY

## 2018-01-16 PROCEDURE — 65610000006 HC RM INTENSIVE CARE

## 2018-01-16 PROCEDURE — 74011636320 HC RX REV CODE- 636/320: Performed by: SURGERY

## 2018-01-16 PROCEDURE — 77030008703 HC TU ET UNCUF COVD -A: Performed by: ANESTHESIOLOGY

## 2018-01-16 RX ORDER — SODIUM CHLORIDE, SODIUM LACTATE, POTASSIUM CHLORIDE, CALCIUM CHLORIDE 600; 310; 30; 20 MG/100ML; MG/100ML; MG/100ML; MG/100ML
INJECTION, SOLUTION INTRAVENOUS
Status: DISCONTINUED | OUTPATIENT
Start: 2018-01-16 | End: 2018-01-16 | Stop reason: HOSPADM

## 2018-01-16 RX ORDER — AMLODIPINE BESYLATE 5 MG/1
10 TABLET ORAL DAILY
Status: DISCONTINUED | OUTPATIENT
Start: 2018-01-17 | End: 2018-01-18 | Stop reason: HOSPADM

## 2018-01-16 RX ORDER — LIDOCAINE HYDROCHLORIDE 20 MG/ML
INJECTION, SOLUTION EPIDURAL; INFILTRATION; INTRACAUDAL; PERINEURAL AS NEEDED
Status: DISCONTINUED | OUTPATIENT
Start: 2018-01-16 | End: 2018-01-16 | Stop reason: HOSPADM

## 2018-01-16 RX ORDER — LABETALOL HYDROCHLORIDE 5 MG/ML
INJECTION, SOLUTION INTRAVENOUS AS NEEDED
Status: DISCONTINUED | OUTPATIENT
Start: 2018-01-16 | End: 2018-01-16 | Stop reason: HOSPADM

## 2018-01-16 RX ORDER — ONDANSETRON 2 MG/ML
4 INJECTION INTRAMUSCULAR; INTRAVENOUS
Status: DISCONTINUED | OUTPATIENT
Start: 2018-01-16 | End: 2018-01-16

## 2018-01-16 RX ORDER — CARVEDILOL 12.5 MG/1
12.5 TABLET ORAL 2 TIMES DAILY WITH MEALS
Status: DISCONTINUED | OUTPATIENT
Start: 2018-01-17 | End: 2018-01-18 | Stop reason: HOSPADM

## 2018-01-16 RX ORDER — SUCCINYLCHOLINE CHLORIDE 20 MG/ML
INJECTION INTRAMUSCULAR; INTRAVENOUS AS NEEDED
Status: DISCONTINUED | OUTPATIENT
Start: 2018-01-16 | End: 2018-01-16 | Stop reason: HOSPADM

## 2018-01-16 RX ORDER — ACETAMINOPHEN 500 MG
500 TABLET ORAL
Status: DISCONTINUED | OUTPATIENT
Start: 2018-01-16 | End: 2018-01-18 | Stop reason: HOSPADM

## 2018-01-16 RX ORDER — LORAZEPAM 2 MG/ML
1 INJECTION INTRAMUSCULAR
Status: DISCONTINUED | OUTPATIENT
Start: 2018-01-16 | End: 2018-01-18 | Stop reason: HOSPADM

## 2018-01-16 RX ORDER — HYDROMORPHONE HCL IN 0.9% NACL 50 MG/50ML
1 PLASTIC BAG, INJECTION (ML) INJECTION
Status: DISCONTINUED | OUTPATIENT
Start: 2018-01-16 | End: 2018-01-16

## 2018-01-16 RX ORDER — SODIUM CHLORIDE 9 MG/ML
INJECTION, SOLUTION INTRAVENOUS
Status: DISCONTINUED | OUTPATIENT
Start: 2018-01-16 | End: 2018-01-16 | Stop reason: HOSPADM

## 2018-01-16 RX ORDER — HYDROMORPHONE HYDROCHLORIDE 2 MG/ML
0.5 INJECTION, SOLUTION INTRAMUSCULAR; INTRAVENOUS; SUBCUTANEOUS
Status: DISCONTINUED | OUTPATIENT
Start: 2018-01-16 | End: 2018-01-18 | Stop reason: HOSPADM

## 2018-01-16 RX ORDER — LEVOTHYROXINE SODIUM 50 UG/1
25 TABLET ORAL
Status: DISCONTINUED | OUTPATIENT
Start: 2018-01-17 | End: 2018-01-18 | Stop reason: HOSPADM

## 2018-01-16 RX ORDER — PROPOFOL 10 MG/ML
INJECTION, EMULSION INTRAVENOUS AS NEEDED
Status: DISCONTINUED | OUTPATIENT
Start: 2018-01-16 | End: 2018-01-16 | Stop reason: HOSPADM

## 2018-01-16 RX ORDER — ALBUTEROL SULFATE 90 UG/1
2 AEROSOL, METERED RESPIRATORY (INHALATION)
Status: DISCONTINUED | OUTPATIENT
Start: 2018-01-16 | End: 2018-01-18 | Stop reason: HOSPADM

## 2018-01-16 RX ORDER — HYDROMORPHONE HYDROCHLORIDE 2 MG/ML
1 INJECTION, SOLUTION INTRAMUSCULAR; INTRAVENOUS; SUBCUTANEOUS ONCE
Status: COMPLETED | OUTPATIENT
Start: 2018-01-16 | End: 2018-01-16

## 2018-01-16 RX ORDER — ESMOLOL HYDROCHLORIDE 10 MG/ML
INJECTION INTRAVENOUS AS NEEDED
Status: DISCONTINUED | OUTPATIENT
Start: 2018-01-16 | End: 2018-01-16 | Stop reason: HOSPADM

## 2018-01-16 RX ORDER — HEPARIN SODIUM 200 [USP'U]/100ML
400 INJECTION, SOLUTION INTRAVENOUS CONTINUOUS
Status: DISCONTINUED | OUTPATIENT
Start: 2018-01-16 | End: 2018-01-16 | Stop reason: SDUPTHER

## 2018-01-16 RX ORDER — ONDANSETRON 4 MG/1
4 TABLET, ORALLY DISINTEGRATING ORAL
Status: COMPLETED | OUTPATIENT
Start: 2018-01-16 | End: 2018-01-16

## 2018-01-16 RX ORDER — DEXAMETHASONE SODIUM PHOSPHATE 4 MG/ML
INJECTION, SOLUTION INTRA-ARTICULAR; INTRALESIONAL; INTRAMUSCULAR; INTRAVENOUS; SOFT TISSUE AS NEEDED
Status: DISCONTINUED | OUTPATIENT
Start: 2018-01-16 | End: 2018-01-16 | Stop reason: HOSPADM

## 2018-01-16 RX ORDER — SODIUM CHLORIDE, SODIUM LACTATE, POTASSIUM CHLORIDE, CALCIUM CHLORIDE 600; 310; 30; 20 MG/100ML; MG/100ML; MG/100ML; MG/100ML
75 INJECTION, SOLUTION INTRAVENOUS CONTINUOUS
Status: DISCONTINUED | OUTPATIENT
Start: 2018-01-16 | End: 2018-01-16 | Stop reason: HOSPADM

## 2018-01-16 RX ORDER — FENTANYL CITRATE 50 UG/ML
INJECTION, SOLUTION INTRAMUSCULAR; INTRAVENOUS AS NEEDED
Status: DISCONTINUED | OUTPATIENT
Start: 2018-01-16 | End: 2018-01-16 | Stop reason: HOSPADM

## 2018-01-16 RX ORDER — NALOXONE HYDROCHLORIDE 0.4 MG/ML
0.2 INJECTION, SOLUTION INTRAMUSCULAR; INTRAVENOUS; SUBCUTANEOUS AS NEEDED
Status: DISCONTINUED | OUTPATIENT
Start: 2018-01-16 | End: 2018-01-16 | Stop reason: HOSPADM

## 2018-01-16 RX ORDER — OLMESARTAN MEDOXOMIL 20 MG/1
40 TABLET ORAL EVERY EVENING
Status: DISCONTINUED | OUTPATIENT
Start: 2018-01-16 | End: 2018-01-18 | Stop reason: HOSPADM

## 2018-01-16 RX ORDER — HEPARIN SODIUM 5000 [USP'U]/100ML
400 INJECTION, SOLUTION INTRAVENOUS CONTINUOUS
Status: DISCONTINUED | OUTPATIENT
Start: 2018-01-16 | End: 2018-01-17

## 2018-01-16 RX ORDER — HEPARIN SODIUM 5000 [USP'U]/ML
INJECTION, SOLUTION INTRAVENOUS; SUBCUTANEOUS AS NEEDED
Status: DISCONTINUED | OUTPATIENT
Start: 2018-01-16 | End: 2018-01-16 | Stop reason: HOSPADM

## 2018-01-16 RX ORDER — HYDRALAZINE HYDROCHLORIDE 20 MG/ML
10 INJECTION INTRAMUSCULAR; INTRAVENOUS
Status: COMPLETED | OUTPATIENT
Start: 2018-01-16 | End: 2018-01-16

## 2018-01-16 RX ORDER — HYDROMORPHONE HYDROCHLORIDE 2 MG/ML
0.5 INJECTION, SOLUTION INTRAMUSCULAR; INTRAVENOUS; SUBCUTANEOUS
Status: COMPLETED | OUTPATIENT
Start: 2018-01-16 | End: 2018-01-16

## 2018-01-16 RX ORDER — HYDROMORPHONE HYDROCHLORIDE 2 MG/ML
INJECTION, SOLUTION INTRAMUSCULAR; INTRAVENOUS; SUBCUTANEOUS AS NEEDED
Status: DISCONTINUED | OUTPATIENT
Start: 2018-01-16 | End: 2018-01-16 | Stop reason: HOSPADM

## 2018-01-16 RX ORDER — ALBUTEROL SULFATE 2.5 MG/.5ML
2.5 SOLUTION RESPIRATORY (INHALATION)
Status: DISCONTINUED | OUTPATIENT
Start: 2018-01-16 | End: 2018-01-18 | Stop reason: HOSPADM

## 2018-01-16 RX ORDER — ISOSORBIDE MONONITRATE 60 MG/1
60 TABLET, EXTENDED RELEASE ORAL 2 TIMES DAILY
Status: DISCONTINUED | OUTPATIENT
Start: 2018-01-16 | End: 2018-01-18 | Stop reason: HOSPADM

## 2018-01-16 RX ORDER — NICARDIPINE HYDROCHLORIDE 0.1 MG/ML
5-15 INJECTION INTRAVENOUS
Status: DISCONTINUED | OUTPATIENT
Start: 2018-01-16 | End: 2018-01-16 | Stop reason: SDUPTHER

## 2018-01-16 RX ORDER — OXYCODONE HYDROCHLORIDE 5 MG/1
5 TABLET ORAL
Status: DISCONTINUED | OUTPATIENT
Start: 2018-01-16 | End: 2018-01-16 | Stop reason: HOSPADM

## 2018-01-16 RX ORDER — SODIUM CHLORIDE 0.9 % (FLUSH) 0.9 %
5-10 SYRINGE (ML) INJECTION AS NEEDED
Status: DISCONTINUED | OUTPATIENT
Start: 2018-01-16 | End: 2018-01-16

## 2018-01-16 RX ORDER — LIDOCAINE HYDROCHLORIDE 20 MG/ML
INJECTION, SOLUTION INFILTRATION; PERINEURAL AS NEEDED
Status: DISCONTINUED | OUTPATIENT
Start: 2018-01-16 | End: 2018-01-16 | Stop reason: HOSPADM

## 2018-01-16 RX ORDER — CLONAZEPAM 1 MG/1
1 TABLET ORAL
Status: DISCONTINUED | OUTPATIENT
Start: 2018-01-16 | End: 2018-01-18 | Stop reason: HOSPADM

## 2018-01-16 RX ORDER — BUDESONIDE 0.5 MG/2ML
500 INHALANT ORAL
Status: DISCONTINUED | OUTPATIENT
Start: 2018-01-16 | End: 2018-01-18 | Stop reason: HOSPADM

## 2018-01-16 RX ORDER — CARVEDILOL 12.5 MG/1
12.5 TABLET ORAL
Status: COMPLETED | OUTPATIENT
Start: 2018-01-16 | End: 2018-01-16

## 2018-01-16 RX ORDER — NITROGLYCERIN 0.4 MG/1
0.4 TABLET SUBLINGUAL
Status: DISCONTINUED | OUTPATIENT
Start: 2018-01-16 | End: 2018-01-18 | Stop reason: HOSPADM

## 2018-01-16 RX ORDER — PANTOPRAZOLE SODIUM 40 MG/1
40 TABLET, DELAYED RELEASE ORAL
Status: DISCONTINUED | OUTPATIENT
Start: 2018-01-17 | End: 2018-01-18 | Stop reason: HOSPADM

## 2018-01-16 RX ORDER — ROCURONIUM BROMIDE 10 MG/ML
INJECTION, SOLUTION INTRAVENOUS AS NEEDED
Status: DISCONTINUED | OUTPATIENT
Start: 2018-01-16 | End: 2018-01-16 | Stop reason: HOSPADM

## 2018-01-16 RX ORDER — ARMODAFINIL 250 MG/1
250 TABLET ORAL
Status: DISCONTINUED | OUTPATIENT
Start: 2018-01-17 | End: 2018-01-18 | Stop reason: HOSPADM

## 2018-01-16 RX ORDER — SODIUM CHLORIDE 0.9 % (FLUSH) 0.9 %
5-10 SYRINGE (ML) INJECTION EVERY 8 HOURS
Status: DISCONTINUED | OUTPATIENT
Start: 2018-01-16 | End: 2018-01-16

## 2018-01-16 RX ORDER — PRASUGREL 10 MG/1
10 TABLET, FILM COATED ORAL DAILY
Status: DISCONTINUED | OUTPATIENT
Start: 2018-01-17 | End: 2018-01-18 | Stop reason: HOSPADM

## 2018-01-16 RX ORDER — ONDANSETRON 2 MG/ML
4 INJECTION INTRAMUSCULAR; INTRAVENOUS
Status: DISCONTINUED | OUTPATIENT
Start: 2018-01-16 | End: 2018-01-18 | Stop reason: HOSPADM

## 2018-01-16 RX ORDER — NICARDIPINE HYDROCHLORIDE 0.1 MG/ML
5-15 INJECTION INTRAVENOUS
Status: DISCONTINUED | OUTPATIENT
Start: 2018-01-16 | End: 2018-01-17 | Stop reason: SDUPTHER

## 2018-01-16 RX ORDER — ASPIRIN 81 MG/1
81 TABLET ORAL DAILY
Status: DISCONTINUED | OUTPATIENT
Start: 2018-01-17 | End: 2018-01-18 | Stop reason: HOSPADM

## 2018-01-16 RX ORDER — DIPHENHYDRAMINE HYDROCHLORIDE 50 MG/ML
12.5 INJECTION, SOLUTION INTRAMUSCULAR; INTRAVENOUS
Status: DISCONTINUED | OUTPATIENT
Start: 2018-01-16 | End: 2018-01-18 | Stop reason: HOSPADM

## 2018-01-16 RX ORDER — RANOLAZINE 500 MG/1
500 TABLET, EXTENDED RELEASE ORAL DAILY
Status: DISCONTINUED | OUTPATIENT
Start: 2018-01-17 | End: 2018-01-18 | Stop reason: HOSPADM

## 2018-01-16 RX ORDER — ATORVASTATIN CALCIUM 40 MG/1
80 TABLET, FILM COATED ORAL
Status: DISCONTINUED | OUTPATIENT
Start: 2018-01-16 | End: 2018-01-18 | Stop reason: HOSPADM

## 2018-01-16 RX ORDER — CEFAZOLIN SODIUM/WATER 2 G/20 ML
2 SYRINGE (ML) INTRAVENOUS
Status: COMPLETED | OUTPATIENT
Start: 2018-01-16 | End: 2018-01-16

## 2018-01-16 RX ORDER — HEPARIN SODIUM 1000 [USP'U]/ML
INJECTION, SOLUTION INTRAVENOUS; SUBCUTANEOUS AS NEEDED
Status: DISCONTINUED | OUTPATIENT
Start: 2018-01-16 | End: 2018-01-16 | Stop reason: HOSPADM

## 2018-01-16 RX ORDER — FUROSEMIDE 40 MG/1
40 TABLET ORAL DAILY
Status: DISCONTINUED | OUTPATIENT
Start: 2018-01-17 | End: 2018-01-18 | Stop reason: HOSPADM

## 2018-01-16 RX ORDER — SODIUM CHLORIDE, SODIUM LACTATE, POTASSIUM CHLORIDE, CALCIUM CHLORIDE 600; 310; 30; 20 MG/100ML; MG/100ML; MG/100ML; MG/100ML
75 INJECTION, SOLUTION INTRAVENOUS CONTINUOUS
Status: DISCONTINUED | OUTPATIENT
Start: 2018-01-16 | End: 2018-01-17

## 2018-01-16 RX ADMIN — ESMOLOL HYDROCHLORIDE 20 MG: 10 INJECTION INTRAVENOUS at 17:34

## 2018-01-16 RX ADMIN — HYDRALAZINE HYDROCHLORIDE 10 MG: 20 INJECTION INTRAMUSCULAR; INTRAVENOUS at 18:52

## 2018-01-16 RX ADMIN — ESMOLOL HYDROCHLORIDE 20 MG: 10 INJECTION INTRAVENOUS at 17:03

## 2018-01-16 RX ADMIN — BUDESONIDE 500 MCG: 0.5 INHALANT RESPIRATORY (INHALATION) at 21:05

## 2018-01-16 RX ADMIN — HEPARIN SODIUM 10000 UNITS: 1000 INJECTION, SOLUTION INTRAVENOUS; SUBCUTANEOUS at 17:27

## 2018-01-16 RX ADMIN — ALBUTEROL SULFATE 2.5 MG: 2.5 SOLUTION RESPIRATORY (INHALATION) at 21:05

## 2018-01-16 RX ADMIN — CARVEDILOL 12.5 MG: 12.5 TABLET, FILM COATED ORAL at 16:47

## 2018-01-16 RX ADMIN — ROCURONIUM BROMIDE 10 MG: 10 INJECTION, SOLUTION INTRAVENOUS at 17:42

## 2018-01-16 RX ADMIN — ROCURONIUM BROMIDE 5 MG: 10 INJECTION, SOLUTION INTRAVENOUS at 17:03

## 2018-01-16 RX ADMIN — ESMOLOL HYDROCHLORIDE 30 MG: 10 INJECTION INTRAVENOUS at 18:20

## 2018-01-16 RX ADMIN — HYDROMORPHONE HYDROCHLORIDE 1 MG: 2 INJECTION, SOLUTION INTRAMUSCULAR; INTRAVENOUS; SUBCUTANEOUS at 12:06

## 2018-01-16 RX ADMIN — LABETALOL HYDROCHLORIDE 10 MG: 5 INJECTION, SOLUTION INTRAVENOUS at 18:23

## 2018-01-16 RX ADMIN — ALTEPLASE 1 MG/HR: KIT at 19:50

## 2018-01-16 RX ADMIN — ATORVASTATIN CALCIUM 80 MG: 40 TABLET, FILM COATED ORAL at 21:37

## 2018-01-16 RX ADMIN — NICARDIPINE HYDROCHLORIDE 5 MG/HR: 0.1 INJECTION, SOLUTION INTRAVENOUS at 20:38

## 2018-01-16 RX ADMIN — Medication 2 G: at 17:06

## 2018-01-16 RX ADMIN — ISOSORBIDE MONONITRATE 60 MG: 60 TABLET, EXTENDED RELEASE ORAL at 21:37

## 2018-01-16 RX ADMIN — HYDROMORPHONE HYDROCHLORIDE 0.5 MG: 2 INJECTION, SOLUTION INTRAMUSCULAR; INTRAVENOUS; SUBCUTANEOUS at 23:44

## 2018-01-16 RX ADMIN — HYDROMORPHONE HYDROCHLORIDE 0.5 MG: 2 INJECTION, SOLUTION INTRAMUSCULAR; INTRAVENOUS; SUBCUTANEOUS at 20:30

## 2018-01-16 RX ADMIN — ESMOLOL HYDROCHLORIDE 20 MG: 10 INJECTION INTRAVENOUS at 18:21

## 2018-01-16 RX ADMIN — SODIUM CHLORIDE, SODIUM LACTATE, POTASSIUM CHLORIDE, AND CALCIUM CHLORIDE 75 ML/HR: 600; 310; 30; 20 INJECTION, SOLUTION INTRAVENOUS at 16:48

## 2018-01-16 RX ADMIN — HYDROMORPHONE HYDROCHLORIDE 0.5 MG: 2 INJECTION, SOLUTION INTRAMUSCULAR; INTRAVENOUS; SUBCUTANEOUS at 19:40

## 2018-01-16 RX ADMIN — SODIUM CHLORIDE: 9 INJECTION, SOLUTION INTRAVENOUS at 17:15

## 2018-01-16 RX ADMIN — HYDRALAZINE HYDROCHLORIDE 10 MG: 20 INJECTION INTRAMUSCULAR; INTRAVENOUS at 19:40

## 2018-01-16 RX ADMIN — ALTEPLASE 1 MG/HR: KIT at 18:19

## 2018-01-16 RX ADMIN — LIDOCAINE HYDROCHLORIDE 100 MG: 20 INJECTION, SOLUTION EPIDURAL; INFILTRATION; INTRACAUDAL; PERINEURAL at 17:03

## 2018-01-16 RX ADMIN — HYDROMORPHONE HYDROCHLORIDE 0.5 MG: 2 INJECTION, SOLUTION INTRAMUSCULAR; INTRAVENOUS; SUBCUTANEOUS at 21:22

## 2018-01-16 RX ADMIN — CLONAZEPAM 1 MG: 1 TABLET ORAL at 22:54

## 2018-01-16 RX ADMIN — PROPOFOL 200 MG: 10 INJECTION, EMULSION INTRAVENOUS at 17:03

## 2018-01-16 RX ADMIN — FENTANYL CITRATE 100 MCG: 50 INJECTION, SOLUTION INTRAMUSCULAR; INTRAVENOUS at 17:03

## 2018-01-16 RX ADMIN — HEPARIN SODIUM AND DEXTROSE 400 UNITS/HR: 5000; 5 INJECTION INTRAVENOUS at 18:18

## 2018-01-16 RX ADMIN — SUCCINYLCHOLINE CHLORIDE 160 MG: 20 INJECTION INTRAMUSCULAR; INTRAVENOUS at 17:03

## 2018-01-16 RX ADMIN — HYDROMORPHONE HYDROCHLORIDE 0.5 MG: 2 INJECTION, SOLUTION INTRAMUSCULAR; INTRAVENOUS; SUBCUTANEOUS at 19:23

## 2018-01-16 RX ADMIN — DEXAMETHASONE SODIUM PHOSPHATE 10 MG: 4 INJECTION, SOLUTION INTRA-ARTICULAR; INTRALESIONAL; INTRAMUSCULAR; INTRAVENOUS; SOFT TISSUE at 17:06

## 2018-01-16 RX ADMIN — OLMESARTAN MEDOXOMIL 40 MG: 20 TABLET, FILM COATED ORAL at 21:37

## 2018-01-16 RX ADMIN — ROCURONIUM BROMIDE 25 MG: 10 INJECTION, SOLUTION INTRAVENOUS at 17:13

## 2018-01-16 RX ADMIN — ONDANSETRON 4 MG: 4 TABLET, ORALLY DISINTEGRATING ORAL at 12:07

## 2018-01-16 RX ADMIN — SODIUM CHLORIDE, SODIUM LACTATE, POTASSIUM CHLORIDE, CALCIUM CHLORIDE: 600; 310; 30; 20 INJECTION, SOLUTION INTRAVENOUS at 16:52

## 2018-01-16 RX ADMIN — HYDROMORPHONE HYDROCHLORIDE 0.5 MG: 2 INJECTION, SOLUTION INTRAMUSCULAR; INTRAVENOUS; SUBCUTANEOUS at 18:20

## 2018-01-16 NOTE — IP AVS SNAPSHOT
Summary of Care Report The Summary of Care report has been created to help improve care coordination. Users with access to [employer] or [address] (Web-based application) may access additional patient information including the Discharge Summary. If you are not currently a [address] user and need more information, please call the number listed below in the [place] [address] section and ask to be connected with Medical Records. Facility Information Name Address Phone [address] [address] [address] [phone] Patient Information Patient Name Sex  Adolm Manual ([id number]) Female 1965 Discharge Information Admitting Provider Service Area Unit Susan Daniels MD / [address] [facility] Final 1 Cv Intensive Cr / [phone] Discharge Provider Discharge Date/Time Discharge Disposition Destination (none) 2018 (Pending) AHR (none) Patient Language Language ENGLISH [13] Hospital Problems as of 2018  Reviewed: 2018  6:38 PM by Susan Daniels MD  
  
  
  
 Class Noted - Resolved Last Modified POA Active Problems * (Principal)Thrombosis of left femoral artery ([address]  2018 - Present 2018 by Susan Daniels MD Yes Entered by Susan Daniels MD  
  
Non-Hospital Problems as of 2018  Reviewed: 2018  6:38 PM by Susan Daniels MD  
  
  
  
 Class Noted - Resolved Last Modified Active Problems   Bilateral low back pain without sciatica  2015 - Present 2015 by Traci Galindo MD  
  Entered by Traci Galindo MD  
  Hypersomnia  2015 - Present 2017 by Dianelys Cervantes MD  
  Entered by Traci Galindo MD  
  Obesity  2015 - Present 2015 by Traci Galindo MD  
  Entered by Traci Galindo MD  
 Obesity, Class I, BMI 30-34.9  12/6/2015 - Present 9/29/2017 by Coral Hopkins MD  
  Entered by Ayza Pedro MD  
  Hyperlipidemia  1/28/2016 - Present 7/21/2017 by Ayaz Pedro MD  
  Entered by Ayaz Pedro MD  
  Essential hypertension  1/28/2016 - Present 1/28/2016 by Ayaz Pedro MD  
  Entered by Ayaz Pedro MD  
  Coronary artery disease involving native coronary artery without angina pectoris  1/28/2016 - Present 1/28/2016 by Ayaz Pedro MD  
  Entered by Ayaz Pedro MD  
  Chronic fatigue  3/23/2017 - Present 3/23/2017 by Ayaz Pedro MD  
  Entered by Ayaz Pedro MD  
  Snoring  6/1/2017 - Present 6/1/2017 by Coral Hopkins MD  
  Entered by Coral Hopkins MD  
  Arterial stenosis ([address]  1/10/2018 - Present 1/10/2018 by Ayaz Pedro MD  
  Entered by Ayaz Pedro MD  
  Overview Signed 1/10/2018  3:55 PM by Ayaz Pedro MD  
   Multiple areas. Under care of vascular doctors and cardiologist. 
  
  
  
Yohana Bowman are allergic to the following No active allergies Current Discharge Medication List  
  
START taking these medications Dose & Instructions Dispensing Information Comments  
 rivaroxaban 20 mg Tab tablet Commonly known as:  Litzy Shepherd Start taking on:  1/19/2018 Dose:  20 mg Take 1 Tab by mouth daily (with breakfast). Indications: left femoral artery stent thrombosis Quantity:  90 Tab Refills:  0 CONTINUE these medications which have CHANGED Dose & Instructions Dispensing Information Comments  
 clobetasol 0.05 % topical cream  
Commonly known as:  Royal Donovan What changed:   
- when to take this 
- reasons to take this Apply  to affected area two (2) times a day. Quantity:  60 g Refills:  2 CONTINUE these medications which have NOT CHANGED Dose & Instructions Dispensing Information Comments albuterol 90 mcg/actuation inhaler Commonly known as:  PROVENTIL HFA, VENTOLIN HFA, PROAIR HFA Dose:  2 Puff Take 2 Puffs by inhalation every six (6) hours as needed for Wheezing or Shortness of Breath. Quantity:  1 Inhaler Refills:  0  
   
 amLODIPine 10 mg tablet Commonly known as:  Nuno Nearing TAKE 1 TABLET (10 MG) BY MOUTH DAILY. Refills:  11 Armodafinil 250 mg Tab tablet Commonly known as:  Tomma Meuse Dose:  250 mg Take 1 Tab by mouth Daily (before breakfast). Max Daily Amount: 250 mg.  
 Quantity:  30 Tab Refills:  5  
   
 aspirin delayed-release 81 mg tablet Dose:  81 mg Take 81 mg by mouth daily. Refills:  0  
   
 atorvastatin 80 mg tablet Commonly known as:  LIPITOR Dose:  80 mg Take 80 mg by mouth nightly. Refills:  0  
   
 budesonide-formoterol 160-4.5 mcg/actuation Hfaa Commonly known as:  SYMBICORT Dose:  2 Puff Take 2 Puffs by inhalation two (2) times a day. Quantity:  1 Inhaler Refills:  0  
   
 carvedilol 12.5 mg tablet Commonly known as:  Manus Camila Dose:  12.5 mg Take 12.5 mg by mouth two (2) times a day. Refills:  0  
   
 clonazePAM 1 mg tablet Commonly known as:  Thresea Gavel Dose:  1 mg Take 1 Tab by mouth daily as needed. Quantity:  30 Tab Refills:  0  
   
 EFFIENT 10 mg tablet Generic drug:  prasugrel Dose:  10 mg Take 10 mg by mouth daily. Refills:  0  
   
 furosemide 40 mg tablet Commonly known as:  LASIX TAKE 1 TABLET BY MOUTH EVERY DAY Quantity:  30 Tab Refills:  1  
   
 isosorbide mononitrate ER 60 mg CR tablet Commonly known as:  IMDUR Dose:  60 mg Take 60 mg by mouth two (2) times a day. Refills:  0  
   
 levothyroxine 25 mcg tablet Commonly known as:  SYNTHROID Dose:  25 mcg Take 1 Tab by mouth Daily (before breakfast). Quantity:  90 Tab Refills:  0  
   
 meclizine 25 mg tablet Commonly known as:  ANTIVERT One tab three times a day as needed for vertigo Quantity:  15 Tab Refills:  0  
   
 multivitamin tablet Commonly known as:  ONE A DAY Dose:  1 Tab Take 1 Tab by mouth daily. Stopped 1/12/18  Indications: did not hold Refills:  0  
   
 nitroglycerin 0.4 mg SL tablet Commonly known as:  NITROSTAT Dose:  0.4 mg  
1 Tab by SubLINGual route every five (5) minutes as needed. Quantity:  25 Tab Refills:  3  
   
 olmesartan 40 mg tablet Commonly known as:  [employer] Dose:  40 mg Take 1 Tab by mouth every evening. Quantity:  30 Tab Refills:  6  
   
 omeprazole 20 mg capsule Commonly known as:  PRILOSEC  
 TAKE 1 CAPSULE BY MOUTH DAILY. INDICATIONS: GASTROESOPHAGEAL REFLUX, HEARTBURN Quantity:  30 Cap Refills:  2  
   
 ranolazine  mg SR tablet Commonly known as:  RANEXA Dose:  500 mg Take 500 mg by mouth daily. Refills:  0 Current Immunizations Name Date Influenza Vaccine 11/9/2016, 11/3/2015, 11/28/2011 Influenza Vaccine (Quad) Mdck Pf 10/4/2017 Pneumococcal Conjugate (PCV-13) 8/31/2016 TD Vaccine 1/29/2001 Surgery Information ID Date/Time Status Primary Surgeon All Procedures Location [id number] 1/16/2018  4:55 PM Tonny Crump MD LEFT LEG ARTERIOGRAM, MECHANICAL THROMBECTOMY OF LEFT FEMORAL ARTERY, THROMBOLYSIS LEFT FEMORAL ARTERY [facility] MAIN OR    
 [id number] 1/17/2018  7:30 AM Posted Aleksey Mills MD LYSIS FOLLOW UP [facility] OUT OF OR ANESTHESIA    
 [id number] 1/17/2018 Unposted   [facility] RAD ANGIO IR OR-DO NOT SCHEDULE Follow-up Information Follow up With Details Comments Contact Info Provider Unknown   Patient not available to ask Discharge Instructions ANGIOGRAPHY DISCHARGE INSTRUCTIONS 1. Check puncture site frequently for swelling or bleeding. If there is any bleeding, lie down and apply pressure over the area with a clean towel or washcloth.  Notify your doctor for any redness, swelling, drainage, or oozing from the puncture site. Notify your doctor for any fever or chills. 2. If the extremity becomes cold, numb, or painful call Dr. Doug Rosales at [phone]. 
3. Activity should be limited for the next 48 hours. Climb stairs as little as possible and avoid any stooping, bending, or strenuous activity for 48 hours. No heavy lifting (anything over 10 pounds) for 3 days. 4. You may resume your usual diet. Drink more fluids than usual. 
5. Have a responsible person drive you home and stay with you for at least 24 hours after your heart catheterization/angiography. 6. You may remove bandage from your Right groin in 24 hours. You may shower in 24 hours. No tub baths, hot tubs, or swimming for 1 week. Do not place any lotions, creams, powders, or ointments over puncture site for 1 week. You may place a clean band-aid over the puncture site each day for 5 days. Change daily. I have read the above instructions and have had the opportunity to ask questions. Patient: ________________________   Date: 1/18/2018 Witness: _______________________   Date: 1/18/2018 Anticoagulants: After Your Visit Your Care Instructions Your doctor prescribed an anticoagulant medicine. Anticoagulants, often called blood thinners, prevent new blood clots from forming and keep existing clots from getting larger. They do not actually thin the blood, but they make the blood take longer to clot. This lowers the risk of a blood clot moving to the lungs (pulmonary embolism) or moving to the brain and causing a stroke. Blood thinners come in two forms. Heparin is given by shot, either under your skin or through a needle in your vein, and starts working right away. Warfarin (Coumadin) comes in pill form and takes longer to work. Your doctor may have you begin taking both forms at the same time. As soon as the pills start to work, you will stop the shots but continue to take the pills. If you have a blood clot in your leg, you may need to take warfarin for several months. People who have heart conditions such as atrial fibrillation often need to take it for the rest of their lives. The right dose of this medicine is different for each person. You will need regular blood tests to see if your dose is correct. Follow-up care is a key part of your treatment and safety. Be sure to make and go to all appointments, and call your doctor if you are having problems. Itâs also a good idea to know your test results and keep a list of the medicines you take. How do you take blood thinners? · Take your medicines exactly as prescribed. Call your doctor if you think you are having a problem with your medicine. · Call your doctor if you are not sure what to do if you missed a dose of blood thinner. ¨ Your doctor can tell you exactly what to do so you do not take too much or too little blood thinner. Then you will be as safe as possible. · Some general rules for what to do if you miss a dose: ¨ If you remember it in the same day, take the missed dose. Then go back to your regular schedule. ¨ If it is the next day, or almost time to take the next dose, do not take the missed dose. Do not double the dose to make up for the missed one. At your next regularly scheduled time, take your normal dose. ¨ If you miss your dose for 2 or more days, call your doctor. · To help you stay on schedule, use a calendar to remind you when to take your blood thinner. When you take the medicine, note it on the calendar. · If you are going to give yourself shots, your doctor will give you instructions for how to safely inject the medicine. Follow the directions carefully. · Do not take any vitamins, over-the-counter medicines, or herbal products without talking to your doctor first. 
· Avoid contact sports and other activities that could lead to injury.  Make your home safe and take other measures to reduce your risk of falling. Always wear a seat belt while in a car. · Do not suddenly change your intake of vitamin Kârich foods, such as broccoli, cabbage, asparagus, lettuce, and spinach. This will help blood thinners work evenly from day to day. · Limit alcohol to 2 drinks a day for men and 1 drink a day for women. Alcohol may interfere with blood thinners. It also increases your risk of falls, which can cause bruising and bleeding. · Tell your dentist, pharmacist, and other health professionals that you take blood thinners. Wear medical alert jewelry that says you take blood thinners. You can buy this at most [employer]. When should you call for help? Call 911 anytime you think you may need emergency care. For example, call if: 
· You cough up blood. · You vomit blood or what looks like coffee grounds. · You pass maroon or very bloody stools. Call your doctor now or seek immediate medical care if: 
· You have new bruises or blood spots under your skin. · You have a nosebleed. · Your gums bleed when you brush your teeth. · You have blood in your urine. · Your stools are black and tarlike or have streaks of blood. · You have vaginal bleeding when you are not having your period, or heavy period bleeding. Watch closely for changes in your health, and be sure to contact your doctor if: 
· You have questions about your medicine. Where can you learn more? Go to Crunchbutton.be Enter R283 in the search box to learn more about \"Anticoagulants: After Your Visit. \" Â© 7103-7606 Healthwise, Incorporated. Care instructions adapted under license by [facility] (which disclaims liability or warranty for this information). This care instruction is for use with your licensed healthcare professional. If you have questions about a medical condition or this instruction, always ask your healthcare professional. [place] [address] any warranty or liability for your use of this information. Content Version: 1.8.71748; Last Revised: July 1, 2009 Chart Review Routing History Recipient Method Report Sent By Eli Reyes MD  
Phone: [phone] In Basket Notes Report Marshall Dubon MD [[id number]] 1/8/2018 10:36 AM 1/8/2018 Juan R Wells MD  
Phone: [phone] In Basket Notes Report Marshall Dubon MD [[id number]] 1/8/2018 10:36 AM 1/8/2018 Nguyen Fitzpatrick MD  
Phone: [phone] In Basket Notes Report Marshall Dubon MD [[id number]] 1/8/2018 10:36 AM 1/8/2018

## 2018-01-16 NOTE — IP AVS SNAPSHOT
[facility] 
 
 
 [address] 
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  
[facility] [address] [facility] [id number] [phone] 2018  9:20 AM EDT  
(Arrive by 9:00 AM) Return appointment with PP SLEEP [place] [facility] [address] ([facility]) [place] [address] [address] [place] [address] [place] [address]  
[phone] Discharge Orders None A check sandi indicates which time of day the medication should be taken. My Medications START taking these medications Instructions Each Dose to Equal  
 Morning Noon Evening Bedtime  
 rivaroxaban 20 mg Tab tablet Commonly known as:  Daniel Necessary Start taking on:  2018 Your last dose was: Your next dose is: Take 1 Tab by mouth daily (with breakfast). Indications: left femoral artery stent thrombosis 20 mg CHANGE how you take these medications Instructions Each Dose to Equal  
 Morning Noon Evening Bedtime  
 clobetasol 0.05 % topical cream  
Commonly known as:  Sandi Cintron What changed:   
- when to take this 
- reasons to take this Your last dose was: Your next dose is:    
   
   
 Apply  to affected area two (2) times a day. CONTINUE taking these medications Instructions Each Dose to Equal  
 Morning Noon Evening Bedtime  
 albuterol 90 mcg/actuation inhaler Commonly known as:  PROVENTIL HFA, VENTOLIN HFA, PROAIR HFA Your last dose was: Your next dose is: Take 2 Puffs by inhalation every six (6) hours as needed for Wheezing or Shortness of Breath. 2 Puff  
    
   
   
   
  
 amLODIPine 10 mg tablet Commonly known as:  Saintclair Rickers Your last dose was: Your next dose is: TAKE 1 TABLET (10 MG) BY MOUTH DAILY. Armodafinil 250 mg Tab tablet Commonly known as:  Sneha Reede Your last dose was: Your next dose is: Take 1 Tab by mouth Daily (before breakfast). Max Daily Amount: 250 mg.  
 250 mg  
    
   
   
   
  
 aspirin delayed-release 81 mg tablet Your last dose was: Your next dose is: Take 81 mg by mouth daily. 81 mg  
    
   
   
   
  
 atorvastatin 80 mg tablet Commonly known as:  LIPITOR Your last dose was: Your next dose is: Take 80 mg by mouth nightly. 80 mg  
    
   
   
   
  
 budesonide-formoterol 160-4.5 mcg/actuation Hfaa Commonly known as:  SYMBICORT Your last dose was: Your next dose is: Take 2 Puffs by inhalation two (2) times a day. 2 Puff  
    
   
   
   
  
 carvedilol 12.5 mg tablet Commonly known as:  Janel Huff Your last dose was: Your next dose is: Take 12.5 mg by mouth two (2) times a day. 12.5 mg  
    
   
   
   
  
 clonazePAM 1 mg tablet Commonly known as:  Rylie Simster Your last dose was: Your next dose is: Take 1 Tab by mouth daily as needed. 1 mg EFFIENT 10 mg tablet Generic drug:  prasugrel Your last dose was: Your next dose is: Take 10 mg by mouth daily.   
 10 mg  
    
   
   
 furosemide 40 mg tablet Commonly known as:  LASIX Your last dose was: Your next dose is: TAKE 1 TABLET BY MOUTH EVERY DAY  
     
   
   
   
  
 isosorbide mononitrate ER 60 mg CR tablet Commonly known as:  IMDUR Your last dose was: Your next dose is: Take 60 mg by mouth two (2) times a day. 60 mg  
    
   
   
   
  
 levothyroxine 25 mcg tablet Commonly known as:  SYNTHROID Your last dose was: Your next dose is: Take 1 Tab by mouth Daily (before breakfast). 25 mcg  
    
   
   
   
  
 meclizine 25 mg tablet Commonly known as:  ANTIVERT Your last dose was: Your next dose is: One tab three times a day as needed for vertigo  
     
   
   
   
  
 multivitamin tablet Commonly known as:  ONE A DAY Your last dose was: Your next dose is: Take 1 Tab by mouth daily. Stopped 1/12/18  Indications: did not hold 1 Tab  
    
   
   
   
  
 nitroglycerin 0.4 mg SL tablet Commonly known as:  NITROSTAT Your last dose was: Your next dose is:    
   
   
 1 Tab by SubLINGual route every five (5) minutes as needed. 0.4 mg  
    
   
   
   
  
 olmesartan 40 mg tablet Commonly known as:  [employer] Your last dose was: Your next dose is: Take 1 Tab by mouth every evening. 40 mg  
    
   
   
   
  
 omeprazole 20 mg capsule Commonly known as:  PRILOSEC Your last dose was: Your next dose is: TAKE 1 CAPSULE BY MOUTH DAILY. INDICATIONS: GASTROESOPHAGEAL REFLUX, HEARTBURN  
     
   
   
   
  
 ranolazine  mg SR tablet Commonly known as:  RANEXA Your last dose was: Your next dose is: Take 500 mg by mouth daily. 500 mg Where to Get Your Medications Information on where to get these meds will be given to you by the nurse or doctor. ! Ask your nurse or doctor about these medications  
  rivaroxaban 20 mg Tab tablet Discharge Instructions ANGIOGRAPHY DISCHARGE INSTRUCTIONS 1. Check puncture site frequently for swelling or bleeding. If there is any bleeding, lie down and apply pressure over the area with a clean towel or washcloth. Notify your doctor for any redness, swelling, drainage, or oozing from the puncture site. Notify your doctor for any fever or chills. 2. If the extremity becomes cold, numb, or painful call Dr. Marshall Hopping at [phone]. 
3. Activity should be limited for the next 48 hours. Climb stairs as little as possible and avoid any stooping, bending, or strenuous activity for 48 hours. No heavy lifting (anything over 10 pounds) for 3 days. 4. You may resume your usual diet. Drink more fluids than usual. 
5. Have a responsible person drive you home and stay with you for at least 24 hours after your heart catheterization/angiography. 6. You may remove bandage from your Right groin in 24 hours. You may shower in 24 hours. No tub baths, hot tubs, or swimming for 1 week. Do not place any lotions, creams, powders, or ointments over puncture site for 1 week. You may place a clean band-aid over the puncture site each day for 5 days. Change daily. I have read the above instructions and have had the opportunity to ask questions. Patient: ________________________   Date: 1/18/2018 Witness: _______________________   Date: 1/18/2018 Anticoagulants: After Your Visit Your Care Instructions Your doctor prescribed an anticoagulant medicine. Anticoagulants, often called blood thinners, prevent new blood clots from forming and keep existing clots from getting larger. They do not actually thin the blood, but they make the blood take longer to clot. This lowers the risk of a blood clot moving to the lungs (pulmonary embolism) or moving to the brain and causing a stroke. Blood thinners come in two forms. Heparin is given by shot, either under your skin or through a needle in your vein, and starts working right away. Warfarin (Coumadin) comes in pill form and takes longer to work. Your doctor may have you begin taking both forms at the same time. As soon as the pills start to work, you will stop the shots but continue to take the pills. If you have a blood clot in your leg, you may need to take warfarin for several months. People who have heart conditions such as atrial fibrillation often need to take it for the rest of their lives. The right dose of this medicine is different for each person. You will need regular blood tests to see if your dose is correct. Follow-up care is a key part of your treatment and safety. Be sure to make and go to all appointments, and call your doctor if you are having problems. Itâs also a good idea to know your test results and keep a list of the medicines you take. How do you take blood thinners? · Take your medicines exactly as prescribed. Call your doctor if you think you are having a problem with your medicine. · Call your doctor if you are not sure what to do if you missed a dose of blood thinner. ¨ Your doctor can tell you exactly what to do so you do not take too much or too little blood thinner. Then you will be as safe as possible. · Some general rules for what to do if you miss a dose: ¨ If you remember it in the same day, take the missed dose. Then go back to your regular schedule. ¨ If it is the next day, or almost time to take the next dose, do not take the missed dose. Do not double the dose to make up for the missed one. At your next regularly scheduled time, take your normal dose. ¨ If you miss your dose for 2 or more days, call your doctor. · To help you stay on schedule, use a calendar to remind you when to take your blood thinner. When you take the medicine, note it on the calendar. · If you are going to give yourself shots, your doctor will give you instructions for how to safely inject the medicine. Follow the directions carefully. · Do not take any vitamins, over-the-counter medicines, or herbal products without talking to your doctor first. 
· Avoid contact sports and other activities that could lead to injury. Make your home safe and take other measures to reduce your risk of falling. Always wear a seat belt while in a car. · Do not suddenly change your intake of vitamin Kârich foods, such as broccoli, cabbage, asparagus, lettuce, and spinach. This will help blood thinners work evenly from day to day. · Limit alcohol to 2 drinks a day for men and 1 drink a day for women. Alcohol may interfere with blood thinners. It also increases your risk of falls, which can cause bruising and bleeding. · Tell your dentist, pharmacist, and other health professionals that you take blood thinners. Wear medical alert jewelry that says you take blood thinners. You can buy this at most drugstores. When should you call for help? Call 911 anytime you think you may need emergency care. For example, call if: 
· You cough up blood. · You vomit blood or what looks like coffee grounds. · You pass maroon or very bloody stools. Call your doctor now or seek immediate medical care if: 
· You have new bruises or blood spots under your skin. · You have a nosebleed. · Your gums bleed when you brush your teeth. · You have blood in your urine. · Your stools are black and tarlike or have streaks of blood. · You have vaginal bleeding when you are not having your period, or heavy period bleeding. Watch closely for changes in your health, and be sure to contact your doctor if: 
· You have questions about your medicine. Where can you learn more? Go to [employer] Enter X594 in the search box to learn more about \"Anticoagulants: After Your Visit. \"  
 Â© 4344-5983 Healthwise, [employer]. Care instructions adapted under license by Annette Mcmahon (which disclaims liability or warranty for this information). This care instruction is for use with your licensed healthcare professional. If you have questions about a medical condition or this instruction, always ask your healthcare professional. [address] any warranty or liability for your use of this information. Content Version: 1.3.85496; Last Revised: July 1, 2009 [employer] Announcement We are excited to announce that we are making your provider's discharge notes available to you in [employer]. You will see these notes when they are completed and signed by the physician that discharged you from your recent hospital stay. If you have any questions or concerns about any information you see in [employer], please call the Health Information Department where you were seen or reach out to your Primary Care Provider for more information about your plan of care. Introducing [facility]! Dear Belen France: Thank you for requesting a [employer] account. Our records indicate that you have previously registered for a [employer] account but its currently inactive. Please call our [employer] support line at [phone]. Additional Information If you have questions, please visit the Frequently Asked Questions section of the [employer] website at [email or web address] Remember, [employer] is NOT to be used for urgent needs. For medical emergencies, dial 911. Now available from your iPhone and Android! Unresulted Labs-Please follow up with your PCP about these lab tests Order Current Status IR REMOVE THER TXCATH INF THROMB CESSATION In process IR THROMBOLYSIS TRANSCATH NON CORONARY OR INTRACRANIAL In process Providers Seen During Your Hospitalization Provider Specialty Primary office phone Maria De Jesus Márquez MD Vascular Surgery [phone] Your Primary Care Physician (PCP) Primary Care Physician Office Phone Office Fax UNKNOWN, PROVIDER ** None ** ** None ** You are allergic to the following No active allergies Recent Documentation Height Weight BMI OB Status Smoking Status 1.702 m 105 kg 36.26 kg/m2 Hysterectomy Former Smoker Emergency Contacts Name Discharge Info Relation Home Work Mobile Jorge Braxton DISCHARGE CAREGIVER [3] Friend [5] [phone] Patient Belongings The following personal items are in your possession at time of discharge: 
  Dental Appliances: Uppers  Visual Aid: Glasses, With patient      Home Medications: None   Jewelry: None  Clothing: Footwear, Pants, Shirt, Undergarments    Other Valuables: None Please provide this summary of care documentation to your next provider. Signatures-by signing, you are acknowledging that this After Visit Summary has been reviewed with you and you have received a copy. Patient Signature:  ____________________________________________________________ Date:  ____________________________________________________________  
  
Rosalee Pringle Provider Signature:  ____________________________________________________________ Date:  ____________________________________________________________

## 2018-01-16 NOTE — BRIEF OP NOTE
BRIEF OPERATIVE NOTE    Date of Procedure: 1/16/2018   Preoperative Diagnosis: Occluded Stent left leg  Postoperative Diagnosis: Occluded Stent left leg    Procedure(s):  LEFT LEG ARTERIOGRAM, MECHANICAL THROMBECTOMY OF LEFT FEMORAL ARTERY, THROMBOLYSIS LEFT FEMORAL ARTERY  Surgeon(s) and Role:     * Margaret Hashimoto, MD - Primary       Surgical Staff:  Circ-1: Hasmukh Vyas RN  Radiology Technician: Srikanth Herron, RT, R, CT; Adarsh You, RT, R, CT  Event Time In   Incision Start 1714   Incision Close 1820     Anesthesia: General   Estimated Blood Loss: < 10 mL  Specimens: * No specimens in log *   Findings: Thrombosis of left SFA stent - opened with pulse-spray thrombolysis. Completion a-gram shows embolic material in left pop trifurcation occluding the peroneal and PT arteries.   Complications: none  Implants: * No implants in log *

## 2018-01-16 NOTE — ED NOTES
I have reviewed discharge instructions with the patient and spouse. The patient and spouse verbalized understanding. Patient left ED via Discharge Method: wheelchair to Home with   Opportunity for questions and clarification provided. Patient given 0 scripts. To continue your aftercare when you leave the hospital, you may receive an automated call from our care team to check in on how you are doing. This is a free service and part of our promise to provide the best care and service to meet your aftercare needs.  If you have questions, or wish to unsubscribe from this service please call [phone]. Thank you for Choosing our [facility] Emergency Department.

## 2018-01-16 NOTE — DISCHARGE INSTRUCTIONS
Peripheral Arterial Disease of the Leg: Care Instructions  Your Care Instructions  Peripheral arterial disease (PAD) occurs when the blood vessels (arteries) that supply blood to the legs, belly, pelvis, arms, or neck get too narrow. This reduces blood flow to that area. The legs are affected most often. Fatty buildup (plaque) in the arteries usually is the cause of PAD. This buildup is also called \"hardening\" of the arteries. Your risk of PAD increases if you smoke or have high cholesterol, high blood pressure, diabetes, or a family history of PAD. Many people do not have symptoms. If you do have symptoms, you may have weak or tired legs, difficulty walking or balancing, or pain. If you have pain, you might feel a tight, aching, or squeezing pain in the calf, foot, thigh, or buttock that occurs during exercise. The pain usually gets worse during exercise and goes away when you rest. If PAD gets worse, you may have symptoms of poor blood flow such as leg pain when you rest.  Medicines and lifestyle changes may help your symptoms and lower your risk of heart attack and stroke. In some cases, surgery or other treatment is needed. It is important that you follow up with your doctor. Follow-up care is a key part of your treatment and safety. Be sure to make and go to all appointments, and call your doctor if you are having problems. It's also a good idea to know your test results and keep a list of the medicines you take. How can you care for yourself at home? · Do not smoke. Smoking can make PAD worse. If you need help quitting, talk to your doctor about stop-smoking programs and medicines. These can increase your chances of quitting for good. · Take your medicines exactly as prescribed. Call your doctor if you think you are having a problem with your medicine. · If you take a blood thinner, such as aspirin, be sure to get instructions about how to take your medicine safely.  Blood thinners can cause serious bleeding problems. · Ask your doctor if a cardiac rehab program is right for you. Cardiac rehab can help you make lifestyle changes. In cardiac rehab, a team of health professionals provides education and support to help you make new, healthy habits. · Eat heart-healthy foods such as fruits, vegetables, whole grains, fish, lean meats, and low-fat or nonfat dairy foods. Limit sodium, sugar, and alcohol. · If your doctor recommends it, get more exercise. Walking is a good choice. Bit by bit, increase the amount you walk every day. Try for at least 30 minutes on most days of the week. If you have symptoms when you exercise, ask your doctor about a special exercise program that may help relieve your symptoms. · Stay at a healthy weight. Lose weight if you need to. · Take good care of your feet. ¨ Treat cuts and scrapes on your legs right away. Poor blood flow prevents (or slows) quick healing of even small cuts or scrapes. This is even more important if you have diabetes. ¨ Avoid shoes that are too tight or that rub your feet. Shoes should be comfortable and fit well. ¨ Avoid socks or stockings that are tight enough to leave elastic-band marks on your legs. Tight socks can make circulation problems worse. ¨ Keep your feet clean and moisturized to prevent drying and cracking. Place cotton or lamb's wool between your toes to prevent rubbing and to absorb moisture. ¨ If you have a sore on your leg or foot, keep it dry and cover it with a nonstick bandage until you see your doctor. When should you call for help? Call 911 anytime you think you may need emergency care. For example, call if:  ? · You have symptoms of a heart attack. These may include:  ¨ Chest pain or pressure, or a strange feeling in the chest.  ¨ Sweating. ¨ Shortness of breath. ¨ Nausea or vomiting. ¨ Pain, pressure, or a strange feeling in the back, neck, jaw, or upper belly or in one or both shoulders or arms.   ¨ Lightheadedness or sudden weakness. ¨ A fast or irregular heartbeat. ? After you call 911, the  may tell you to chew 1 adult-strength or 2 to 4 low-dose aspirin. Wait for an ambulance. Do not try to drive yourself. ? · You have sudden, severe leg pain, and your leg is cool and pale. ? · You have symptoms of a stroke. These may include:  ¨ Sudden numbness, tingling, weakness, or loss of movement in your face, arm, or leg, especially on only one side of your body. ¨ Sudden vision changes. ¨ Sudden trouble speaking. ¨ Sudden confusion or trouble understanding simple statements. ¨ Sudden problems with walking or balance. ¨ A sudden, severe headache that is different from past headaches. ?Call your doctor now or seek immediate medical care if:  ? · You have leg pain that does not go away even if you rest.   ? · Your leg pain changes or gets worse. For example, if you have more pain with normal activity or the same pain with decreased activity, you should call. ? · You have cold or numb feet or toes. ? · You have leg or foot sores that are slow to heal.   ? · The skin on your legs or feet changes color. ? · You have an open sore on your leg or foot that is infected. Signs of infection include:  ¨ Increased pain, swelling, warmth, or redness. ¨ Red streaks leading from the sore. ¨ Pus draining from the sore. ¨ A fever. ? Watch closely for changes in your health, and be sure to contact your doctor if you have any problems. Where can you learn more? Go to http://mitchell-karen.info/. Enter [phone] in the search box to learn more about \"Peripheral Arterial Disease of the Leg: Care Instructions. \"  Current as of: September 21, 2016  Content Version: 11.4  © 4009-9881 [employer]. Care instructions adapted under license by [employer] (which disclaims liability or warranty for this information).  If you have questions about a medical condition or this instruction, always ask your healthcare professional. [place] [address] any warranty or liability for your use of this information. Acute Pain After Surgery: Care Instructions  Your Care Instructions    It's common to have some pain after surgery. Pain doesn't mean that something is wrong or that the surgery didn't go well. But when the pain is severe, it's important to work with your doctor to manage it. It's also important to be aware of a few facts about pain and pain medicine. · You are the only person who knows what your pain feels like. So be sure to tell your doctor when you are in pain or when the pain changes. Then he or she will know how to adjust your medicines. · Pain is often easier to control right after it starts. So it may be better to take regular doses of pain medicine and not wait until the pain gets bad. · Medicine can help control pain. But this doesn't mean you'll have no pain. Medicine works to keep the pain at a level you can live with. With time, you will feel better. Follow-up care is a key part of your treatment and safety. Be sure to make and go to all appointments, and call your doctor if you are having problems. It's also a good idea to know your test results and keep a list of the medicines you take. How can you care for yourself at home? · Be safe with medicines. Read and follow all instructions on the label. ¨ If the doctor gave you a prescription medicine for pain, take it as prescribed. ¨ If you are not taking a prescription pain medicine, ask your doctor if you can take an over-the-counter medicine. · If you take an over-the-counter pain medicine, such as acetaminophen (Tylenol), ibuprofen (Advil, Motrin), or naproxen (Aleve), read and follow all instructions on the label. · Do not take two or more pain medicines at the same time unless the doctor told you to. · Do not drink alcohol while you are taking pain medicines. · Try to walk each day if your doctor recommends it. Start by walking a little more than you did the day before. Bit by bit, increase the amount you walk. Walking increases blood flow. It also helps prevent pneumonia and constipation. · To prevent constipation from opioid pain medicines:  ¨ Talk to your doctor about a laxative. ¨ Include fruits, vegetables, beans, and whole grains in your diet each day. These foods are high in fiber. ¨ Drink plenty of fluids, enough so that your urine is light yellow or clear like water. Drink water, fruit juice, or other drinks that do not contain caffeine or alcohol. If you have kidney, heart, or liver disease and have to limit fluids, talk with your doctor before you increase the amount of fluids you drink. ¨ Take a fiber supplement, such as Citrucel or Metamucil, every day if needed. Read and follow all instructions on the label. If you take pain medicine for more than a few days, talk to your doctor before you take fiber. When should you call for help? Call your doctor now or seek immediate medical care if:  ? · Your pain gets worse. ? · Your pain is not controlled by medicine. ? Watch closely for changes in your health, and be sure to contact your doctor if you have any problems. Where can you learn more? Go to http://mitchell-karen.info/. Enter [phone] in the search box to learn more about \"Acute Pain After Surgery: Care Instructions. \"  Current as of: March 20, 2017  Content Version: 11.4  © 0449-1357 [employer]. Care instructions adapted under license by [employer] (which disclaims liability or warranty for this information). If you have questions about a medical condition or this instruction, always ask your healthcare professional. [address] any warranty or liability for your use of this information.

## 2018-01-16 NOTE — ED PROVIDER NOTES
HPI Comments: Patient is a 53-year-old female who had left superficial femoral artery arthrectomy balloon angioplasty and stenting. This was done yesterday by Dr. Josep Chapa. She states she's had intense pain since the surgery that she was not provided with any narcotics. She has pain mainly in the left thigh. It is 10 out of 10, severe and constant. Patient is a 50 y.o. female presenting with post-operative complications. The history is provided by the patient. Post OP Complication   Pertinent negatives include no chest pain, no abdominal pain and no shortness of breath.         Past Medical History:   Diagnosis Date    Arthritis     CAD (coronary artery disease) CABG 2009    cabg -- stent x 1--2015-- followed by dr. Noemí Jolly Chronic back pain states cannot lie flat due to back pain    Chronic kidney disease     stents x 2--    Chronic obstructive pulmonary disease (HCC)     daily inhaler---- no home O2    Chronic pain     Coagulation defect ([address]     DDD (degenerative disc disease), lumbar     Depression     Fibromyalgia     GERD (gastroesophageal reflux disease)     controlled with med    Hypercholesterolemia     Hypertension     controlled with med    Joint ache     Left leg pain     Liver disease     enlarged per pt    Memory impairment     MI, old x 2    2009    Narcolepsy     Obesity (BMI 30-39.9)     36.6    Persistent insomnia      PVD (peripheral vascular disease) ([address]     S/P CABG x 4     Stented coronary artery     EF 55-60% echo 12/15    Thyroid disease     hypo    Vertigo        Past Surgical History:   Procedure Laterality Date    HX  SECTION      x3    HX CHOLECYSTECTOMY      HX CORONARY ARTERY BYPASS GRAFT  2009    x 4 grafts    HX CORONARY STENT PLACEMENT  2015    x1 spring    HX HYSTERECTOMY      HX ORTHOPAEDIC      left knee arthroscopy     HX OTHER SURGICAL      renal stents     HX SALPINGO-OOPHORECTOMY           Family History:   Problem Relation Age of Onset    Diabetes Mother     Heart Disease Mother     Kidney Disease Mother     Lung Disease Mother     Diabetes Father     Heart Disease Father     Kidney Disease Father     Lung Disease Father        Social History     Social History    Marital status: SINGLE     Spouse name: N/A    Number of children: N/A    Years of education: N/A     Occupational History    Not on file. Social History Main Topics    Smoking status: Former Smoker     Packs/day: 1.00     Years: 36.00     Types: Cigarettes     Quit date: 10/9/2016    Smokeless tobacco: Never Used    Alcohol use No      Comment:      Drug use: No    Sexual activity: Not on file     Other Topics Concern    Not on file     Social History Narrative         ALLERGIES: Review of patient's allergies indicates no known allergies. Review of Systems   Constitutional: Negative for chills and fever. Respiratory: Negative for chest tightness, shortness of breath and wheezing. Cardiovascular: Negative for chest pain and palpitations. Gastrointestinal: Negative for abdominal pain, diarrhea, nausea and vomiting. Skin: Negative. All other systems reviewed and are negative. Vitals:    01/16/18 1123   BP: (!) 169/93   Pulse: 92   Resp: 16   Temp: 98.7 °F (37.1 °C)   SpO2: 98%   Weight: 102.1 kg (225 lb)   Height: 5' 7\" (1.702 m)            Physical Exam   Constitutional: She is oriented to person, place, and time. She appears well-developed and well-nourished. She appears distressed (appears uncomfortable from pain). HENT:   Head: Normocephalic and atraumatic. Eyes: EOM are normal. Pupils are equal, round, and reactive to light. Neck: Normal range of motion. Neck supple. Cardiovascular:   Left leg slightly cooler than the right leg. This starts at about the level of the knee. The leg is not cold however. I can Doppler a dorsalis pedis that is fairly strong.   There is a very weak posterior tibia pulse dopplerable   Abdominal: Soft. There is no tenderness. There is no rebound and no guarding. Neurological: She is alert and oriented to person, place, and time. Skin: She is not diaphoretic. Psychiatric: She has a normal mood and affect. Her behavior is normal.        MDM  Number of Diagnoses or Management Options  Arterial stenosis [facility]):   Postoperative pain of extremity:   Diagnosis management comments: Given patient's recent surgery and intense pain I called vascular surgery spoke with Dr. Lizzeth Whitehead who feels the patient should see her surgeon immediately he helped arrange for an appointment at 2:15 today with Dr. [facility] I have treated patient's pain while in the ED. Joy Trejo MD; 1/16/2018 @2:03 PM Voice dictation software was used during the making of this note. This software is not perfect and grammatical and other typographical errors may be present. This note has not been proofread for errors.  ===================================================================        Amount and/or Complexity of Data Reviewed  Tests in the radiology section of CPT®: ordered and reviewed  Decide to obtain previous medical records or to obtain history from someone other than the patient: yes (Segmental occlusion of distal left superficial femoral artery successfully treated with atherectomy, balloon angioplasty and stenting. Focally irregular high grade stenosis of mid left popliteal artery successfully treated with primary stenting and balloon angioplasty.   Good 3-vessel runoff.        Tata Stanley MD    )      ED Course       Procedures

## 2018-01-16 NOTE — ANESTHESIA PREPROCEDURE EVALUATION
Anesthetic History               Review of Systems / Medical History  Patient summary reviewed, nursing notes reviewed and pertinent labs reviewed    Pulmonary    COPD: mild      Undiagnosed apnea and smoker         Neuro/Psych         Psychiatric history     Cardiovascular    Hypertension: well controlled          Past MI (2009), CAD, PAD, cardiac stents (4/15) and CABG (2009)    Exercise tolerance: <4 METS  Comments: Stent in 4/2015   GI/Hepatic/Renal     GERD: well controlled           Endo/Other      Hypothyroidism: well controlled  Obesity and arthritis     Other Findings            Physical Exam    Airway  Mallampati: II  TM Distance: 4 - 6 cm  Neck ROM: normal range of motion   Mouth opening: Normal     Cardiovascular    Rhythm: regular  Rate: normal         Dental    Dentition: Full upper dentures, Full lower dentures and Edentulous     Pulmonary  Breath sounds clear to auscultation               Abdominal  GI exam deferred       Other Findings            Anesthetic Plan    ASA: 3, emergent  Anesthesia type: general          Induction: Intravenous and RSI  Anesthetic plan and risks discussed with: Patient and Spouse      Proceed emergently to OR. NPO since 1030. Will give Coreg.

## 2018-01-16 NOTE — ED TRIAGE NOTES
Patient with severe pain to left leg, advises having a stent placed in left leg artery yesterday. Noted to be cold to touch, unable to palpate a pulse in foot during triage.

## 2018-01-17 ENCOUNTER — APPOINTMENT (OUTPATIENT)
Dept: INTERVENTIONAL RADIOLOGY/VASCULAR | Age: 53
DRG: 271 | End: 2018-01-17
Attending: SURGERY
Payer: MEDICARE

## 2018-01-17 ENCOUNTER — ANESTHESIA EVENT (OUTPATIENT)
Dept: SURGERY | Age: 53
DRG: 271 | End: 2018-01-17
Payer: MEDICARE

## 2018-01-17 ENCOUNTER — ANESTHESIA (OUTPATIENT)
Dept: SURGERY | Age: 53
DRG: 271 | End: 2018-01-17
Payer: MEDICARE

## 2018-01-17 LAB
ANION GAP SERPL CALC-SCNC: 11 MMOL/L (ref 7–16)
APTT PPP: 29.2 SEC (ref 23.2–35.3)
APTT PPP: 36.6 SEC (ref 23.2–35.3)
BUN SERPL-MCNC: 13 MG/DL (ref 6–23)
CALCIUM SERPL-MCNC: 8.6 MG/DL (ref 8.3–10.4)
CHLORIDE SERPL-SCNC: 105 MMOL/L (ref 98–107)
CO2 SERPL-SCNC: 25 MMOL/L (ref 21–32)
CREAT SERPL-MCNC: 0.82 MG/DL (ref 0.6–1)
ERYTHROCYTE [DISTWIDTH] IN BLOOD BY AUTOMATED COUNT: 13.4 % (ref 11.9–14.6)
ERYTHROCYTE [DISTWIDTH] IN BLOOD BY AUTOMATED COUNT: 13.5 % (ref 11.9–14.6)
FIBRINOGEN PPP-MCNC: 362 MG/DL (ref 190–501)
FIBRINOGEN PPP-MCNC: 450 MG/DL (ref 190–501)
GLUCOSE SERPL-MCNC: 117 MG/DL (ref 65–100)
HCT VFR BLD AUTO: 36.3 % (ref 35.8–46.3)
HCT VFR BLD AUTO: 40.8 % (ref 35.8–46.3)
HCT VFR BLD AUTO: 41.6 % (ref 35.8–46.3)
HGB BLD-MCNC: 11.9 G/DL (ref 11.7–15.4)
HGB BLD-MCNC: 13.7 G/DL (ref 11.7–15.4)
HGB BLD-MCNC: 14.1 G/DL (ref 11.7–15.4)
INR PPP: 1
INR PPP: 1
MCH RBC QN AUTO: 30.2 PG (ref 26.1–32.9)
MCH RBC QN AUTO: 30.7 PG (ref 26.1–32.9)
MCHC RBC AUTO-ENTMCNC: 33.6 G/DL (ref 31.4–35)
MCHC RBC AUTO-ENTMCNC: 33.9 G/DL (ref 31.4–35)
MCV RBC AUTO: 90.1 FL (ref 79.6–97.8)
MCV RBC AUTO: 90.6 FL (ref 79.6–97.8)
PLATELET # BLD AUTO: 241 K/UL (ref 150–450)
PLATELET # BLD AUTO: 256 K/UL (ref 150–450)
PMV BLD AUTO: 10.6 FL (ref 10.8–14.1)
PMV BLD AUTO: 11.3 FL (ref 10.8–14.1)
POTASSIUM SERPL-SCNC: 4.2 MMOL/L (ref 3.5–5.1)
PROTHROMBIN TIME: 12.9 SEC (ref 11.5–14.5)
PROTHROMBIN TIME: 12.9 SEC (ref 11.5–14.5)
RBC # BLD AUTO: 4.53 M/UL (ref 4.05–5.25)
RBC # BLD AUTO: 4.59 M/UL (ref 4.05–5.25)
SODIUM SERPL-SCNC: 141 MMOL/L (ref 136–145)
WBC # BLD AUTO: 14 K/UL (ref 4.3–11.1)
WBC # BLD AUTO: 17.2 K/UL (ref 4.3–11.1)

## 2018-01-17 PROCEDURE — 04QK4ZZ REPAIR RIGHT FEMORAL ARTERY, PERCUTANEOUS ENDOSCOPIC APPROACH: ICD-10-PCS | Performed by: SURGERY

## 2018-01-17 PROCEDURE — 77030019605

## 2018-01-17 PROCEDURE — 36415 COLL VENOUS BLD VENIPUNCTURE: CPT | Performed by: SURGERY

## 2018-01-17 PROCEDURE — 74011250637 HC RX REV CODE- 250/637: Performed by: SURGERY

## 2018-01-17 PROCEDURE — 37214 CESSJ THERAPY CATH REMOVAL: CPT

## 2018-01-17 PROCEDURE — 74011250636 HC RX REV CODE- 250/636: Performed by: SURGERY

## 2018-01-17 PROCEDURE — 65610000006 HC RM INTENSIVE CARE

## 2018-01-17 PROCEDURE — 85384 FIBRINOGEN ACTIVITY: CPT | Performed by: SURGERY

## 2018-01-17 PROCEDURE — 74011250636 HC RX REV CODE- 250/636: Performed by: THORACIC SURGERY (CARDIOTHORACIC VASCULAR SURGERY)

## 2018-01-17 PROCEDURE — 85014 HEMATOCRIT: CPT | Performed by: SURGERY

## 2018-01-17 PROCEDURE — 74011000250 HC RX REV CODE- 250: Performed by: SURGERY

## 2018-01-17 PROCEDURE — 85730 THROMBOPLASTIN TIME PARTIAL: CPT | Performed by: SURGERY

## 2018-01-17 PROCEDURE — 85610 PROTHROMBIN TIME: CPT | Performed by: SURGERY

## 2018-01-17 PROCEDURE — 04PYX3Z REMOVAL OF INFUSION DEVICE FROM LOWER ARTERY, EXTERNAL APPROACH: ICD-10-PCS | Performed by: SURGERY

## 2018-01-17 PROCEDURE — 77010033678 HC OXYGEN DAILY

## 2018-01-17 PROCEDURE — 77030032490 HC SLV COMPR SCD KNE COVD -B

## 2018-01-17 PROCEDURE — 94640 AIRWAY INHALATION TREATMENT: CPT

## 2018-01-17 PROCEDURE — C1760 CLOSURE DEV, VASC: HCPCS

## 2018-01-17 PROCEDURE — C1769 GUIDE WIRE: HCPCS

## 2018-01-17 PROCEDURE — 74011250636 HC RX REV CODE- 250/636

## 2018-01-17 PROCEDURE — 74011250636 HC RX REV CODE- 250/636: Performed by: ANESTHESIOLOGY

## 2018-01-17 PROCEDURE — 85027 COMPLETE CBC AUTOMATED: CPT | Performed by: SURGERY

## 2018-01-17 PROCEDURE — B41G1ZZ FLUOROSCOPY OF LEFT LOWER EXTREMITY ARTERIES USING LOW OSMOLAR CONTRAST: ICD-10-PCS | Performed by: SURGERY

## 2018-01-17 PROCEDURE — 74011636320 HC RX REV CODE- 636/320: Performed by: SURGERY

## 2018-01-17 PROCEDURE — 80048 BASIC METABOLIC PNL TOTAL CA: CPT | Performed by: SURGERY

## 2018-01-17 PROCEDURE — 76060000034 HC ANESTHESIA 1.5 TO 2 HR: Performed by: SURGERY

## 2018-01-17 PROCEDURE — 74011000258 HC RX REV CODE- 258: Performed by: SURGERY

## 2018-01-17 PROCEDURE — 74011000250 HC RX REV CODE- 250

## 2018-01-17 RX ORDER — PROPOFOL 10 MG/ML
INJECTION, EMULSION INTRAVENOUS AS NEEDED
Status: DISCONTINUED | OUTPATIENT
Start: 2018-01-17 | End: 2018-01-17 | Stop reason: HOSPADM

## 2018-01-17 RX ORDER — LIDOCAINE HYDROCHLORIDE 20 MG/ML
2-10 INJECTION, SOLUTION INFILTRATION; PERINEURAL
Status: DISCONTINUED | OUTPATIENT
Start: 2018-01-17 | End: 2018-01-17

## 2018-01-17 RX ORDER — LIDOCAINE HYDROCHLORIDE 20 MG/ML
INJECTION, SOLUTION EPIDURAL; INFILTRATION; INTRACAUDAL; PERINEURAL AS NEEDED
Status: DISCONTINUED | OUTPATIENT
Start: 2018-01-17 | End: 2018-01-17 | Stop reason: HOSPADM

## 2018-01-17 RX ORDER — IODIXANOL 320 MG/ML
5-200 INJECTION, SOLUTION INTRAVASCULAR
Status: COMPLETED | OUTPATIENT
Start: 2018-01-17 | End: 2018-01-17

## 2018-01-17 RX ORDER — HEPARIN SODIUM 5000 [USP'U]/100ML
18-36 INJECTION, SOLUTION INTRAVENOUS
Status: DISPENSED | OUTPATIENT
Start: 2018-01-17 | End: 2018-01-18

## 2018-01-17 RX ORDER — HEPARIN SODIUM 200 [USP'U]/100ML
1000 INJECTION, SOLUTION INTRAVENOUS ONCE
Status: COMPLETED | OUTPATIENT
Start: 2018-01-17 | End: 2018-01-17

## 2018-01-17 RX ORDER — PROPOFOL 10 MG/ML
INJECTION, EMULSION INTRAVENOUS
Status: DISCONTINUED | OUTPATIENT
Start: 2018-01-17 | End: 2018-01-17 | Stop reason: HOSPADM

## 2018-01-17 RX ORDER — FENTANYL CITRATE 50 UG/ML
INJECTION, SOLUTION INTRAMUSCULAR; INTRAVENOUS AS NEEDED
Status: DISCONTINUED | OUTPATIENT
Start: 2018-01-17 | End: 2018-01-17 | Stop reason: HOSPADM

## 2018-01-17 RX ADMIN — SODIUM CHLORIDE 2.5 MG/HR: 0.9 INJECTION, SOLUTION INTRAVENOUS at 00:48

## 2018-01-17 RX ADMIN — HEPARIN SODIUM AND DEXTROSE 18.09 UNITS/KG/HR: 5000; 5 INJECTION INTRAVENOUS at 16:30

## 2018-01-17 RX ADMIN — ALTEPLASE 1 MG/HR: KIT at 05:06

## 2018-01-17 RX ADMIN — BUDESONIDE 500 MCG: 0.5 INHALANT RESPIRATORY (INHALATION) at 19:55

## 2018-01-17 RX ADMIN — FENTANYL CITRATE 50 MCG: 50 INJECTION, SOLUTION INTRAMUSCULAR; INTRAVENOUS at 08:18

## 2018-01-17 RX ADMIN — ATORVASTATIN CALCIUM 80 MG: 40 TABLET, FILM COATED ORAL at 22:48

## 2018-01-17 RX ADMIN — HYDROMORPHONE HYDROCHLORIDE 0.5 MG: 2 INJECTION, SOLUTION INTRAMUSCULAR; INTRAVENOUS; SUBCUTANEOUS at 05:01

## 2018-01-17 RX ADMIN — AMLODIPINE BESYLATE 10 MG: 5 TABLET ORAL at 09:41

## 2018-01-17 RX ADMIN — FENTANYL CITRATE 25 MCG: 50 INJECTION, SOLUTION INTRAMUSCULAR; INTRAVENOUS at 08:08

## 2018-01-17 RX ADMIN — CARVEDILOL 12.5 MG: 12.5 TABLET, FILM COATED ORAL at 09:41

## 2018-01-17 RX ADMIN — WATER 1 G: 1 INJECTION INTRAMUSCULAR; INTRAVENOUS; SUBCUTANEOUS at 00:54

## 2018-01-17 RX ADMIN — PANTOPRAZOLE SODIUM 40 MG: 40 TABLET, DELAYED RELEASE ORAL at 06:02

## 2018-01-17 RX ADMIN — IODIXANOL 26 ML: 320 INJECTION, SOLUTION INTRAVASCULAR at 08:23

## 2018-01-17 RX ADMIN — SODIUM CHLORIDE 1000 ML: 900 INJECTION, SOLUTION INTRAVENOUS at 22:25

## 2018-01-17 RX ADMIN — WATER 1 G: 1 INJECTION INTRAMUSCULAR; INTRAVENOUS; SUBCUTANEOUS at 16:30

## 2018-01-17 RX ADMIN — PRASUGREL HYDROCHLORIDE 10 MG: 10 TABLET, FILM COATED ORAL at 09:41

## 2018-01-17 RX ADMIN — HYDROMORPHONE HYDROCHLORIDE 0.5 MG: 2 INJECTION, SOLUTION INTRAMUSCULAR; INTRAVENOUS; SUBCUTANEOUS at 09:32

## 2018-01-17 RX ADMIN — PHENYLEPHRINE HYDROCHLORIDE 10 MCG/MIN: 10 INJECTION INTRAVENOUS at 22:38

## 2018-01-17 RX ADMIN — HEPARIN SODIUM 2000 UNITS: 200 INJECTION, SOLUTION INTRAVENOUS at 07:22

## 2018-01-17 RX ADMIN — LORAZEPAM 1 MG: 2 INJECTION INTRAMUSCULAR; INTRAVENOUS at 10:32

## 2018-01-17 RX ADMIN — ISOSORBIDE MONONITRATE 60 MG: 60 TABLET, EXTENDED RELEASE ORAL at 09:41

## 2018-01-17 RX ADMIN — ALBUTEROL SULFATE 2.5 MG: 2.5 SOLUTION RESPIRATORY (INHALATION) at 19:55

## 2018-01-17 RX ADMIN — LORAZEPAM 1 MG: 2 INJECTION INTRAMUSCULAR; INTRAVENOUS at 00:44

## 2018-01-17 RX ADMIN — RANOLAZINE 500 MG: 500 TABLET, FILM COATED, EXTENDED RELEASE ORAL at 09:32

## 2018-01-17 RX ADMIN — FUROSEMIDE 40 MG: 40 TABLET ORAL at 09:41

## 2018-01-17 RX ADMIN — HYDROMORPHONE HYDROCHLORIDE 0.5 MG: 2 INJECTION, SOLUTION INTRAMUSCULAR; INTRAVENOUS; SUBCUTANEOUS at 18:04

## 2018-01-17 RX ADMIN — LEVOTHYROXINE SODIUM 25 MCG: 50 TABLET ORAL at 06:00

## 2018-01-17 RX ADMIN — HYDROMORPHONE HYDROCHLORIDE 0.5 MG: 2 INJECTION, SOLUTION INTRAMUSCULAR; INTRAVENOUS; SUBCUTANEOUS at 11:42

## 2018-01-17 RX ADMIN — ACETAMINOPHEN 500 MG: 500 TABLET, FILM COATED ORAL at 22:48

## 2018-01-17 RX ADMIN — PROPOFOL 75 MCG/KG/MIN: 10 INJECTION, EMULSION INTRAVENOUS at 07:35

## 2018-01-17 RX ADMIN — FENTANYL CITRATE 25 MCG: 50 INJECTION, SOLUTION INTRAMUSCULAR; INTRAVENOUS at 07:36

## 2018-01-17 RX ADMIN — PROPOFOL 30 MG: 10 INJECTION, EMULSION INTRAVENOUS at 08:07

## 2018-01-17 RX ADMIN — ALBUTEROL SULFATE 2.5 MG: 2.5 SOLUTION RESPIRATORY (INHALATION) at 13:06

## 2018-01-17 RX ADMIN — PROPOFOL 40 MG: 10 INJECTION, EMULSION INTRAVENOUS at 07:35

## 2018-01-17 RX ADMIN — ASPIRIN 81 MG: 81 TABLET, COATED ORAL at 09:41

## 2018-01-17 RX ADMIN — WATER 2 G: 1 INJECTION INTRAMUSCULAR; INTRAVENOUS; SUBCUTANEOUS at 07:59

## 2018-01-17 RX ADMIN — LIDOCAINE HYDROCHLORIDE 40 MG: 20 INJECTION, SOLUTION EPIDURAL; INFILTRATION; INTRACAUDAL; PERINEURAL at 07:35

## 2018-01-17 NOTE — PROGRESS NOTES
Right posterior tibial pulse was doppler  and right dorsalis pedis pulse was doppler prior to sheath removal. 6FR arterial sheath removed from right groin by LORI Santana using sterile technique at 0820. Manual pressure held over site for 25 minutes. Hemostasis achieved at [address] Right groin without bleeding without hematoma. Sterile gauze placed over site and secured with tegaderm. Patient instructed to keep right leg straight and still and head on pillow. Patient verbalizes understanding.   Right posterior tibial pulse was doppler and right dorsalis pedis pulse was doppler after sheath removal.

## 2018-01-17 NOTE — PROGRESS NOTES
S/p LLE lysis  Warm left foot w palpable pedal pulse  Blood pressure 141/64, pulse 64, temperature 98.4 °F (36.9 °C), resp. rate 8, height 5' 7\" (1.702 m), weight 232 lb 12.9 oz (105.6 kg), SpO2 100 %. No groin hematoma    A/p: Will start IV heparin now, possibly convert to DOAC tomorrow. OK to advance diet.

## 2018-01-17 NOTE — PROGRESS NOTES
Pt in IR for procedure. Pt receiving MAC anesthesia, please refer to anesthesia notes for all vital signs documented.

## 2018-01-17 NOTE — OP NOTES
Myles [address]  MR#: [id number]  : 1965  ACCOUNT #: [de-identified]   DATE OF SERVICE: 2018    PREOPERATIVE DIAGNOSIS:  Left lower extremity femoral artery thrombosis. POSTOPERATIVE DIAGNOSIS:  Left lower extremity femoral artery thrombosis. PROCEDURE PERFORMED:  Followup lysis with ProGlide right femoral closure. SURGEON:  Camille Lombardo MD    ANESTHESIA:  MAC.    ESTIMATED BLOOD LOSS:  15 mL. COMPLICATIONS:  None. SPECIMENS REMOVED:  None. FLUOROSCOPY TIME:  2 minutes 12 seconds. CONTRAST DOSAGE:  26 mL of Visipaque. STATEMENT OF MEDICAL NECESSITY:  The patient is a 51-year-old woman with severe peripheral arterial disease who yesterday evening underwent thrombolysis of the left lower extremity arteries. There was still residual thrombus, and so overnight, lysis was required. She returns today for followup studies and intervention as needed. DETAILS OF PROCEDURE:  The patient was taken to the interventional radiology suite and monitored anesthesia care was provided. The apparatus in the right groin was prepped and draped in the usual sterile fashion. Arteriographic imaging was performed by injecting the thrombolysis catheter to view the entirety of the left leg, and the proximal aspect of the thigh was imaged by injection of contrast by hand through the sheath after appropriate flushes. No abnormalities were noted, and so the procedure was essentially terminated, and we then proceeded to closure. The sheath was pulled back to the right side. The wire was advanced into the aorta, and then the groin was again prepped with ChloraPrep and the ProGlide closure device was placed. The initial device malfunctioned, as the plunger at the end of the device became dislodged when the initial device was being positioned, and so it was rewired and a new device was placed. This one functioned appropriately.   However, the hemostasis was still not ideal, and so pressure was held after this for hemostasis. Also, imaging of the right femoral bifurcation was performed prior to the ProGlide closure, as per the IFU, and confirmation of appropriate wire placement was confirmed when the original device was rewired for the second deployment. ANGIOGRAPHIC FINDINGS:  1. The study is essentially normal.  The left superficial femoral artery, including the stented segment, is widely patent with no evidence of stenosis or filling defect. Similarly, the popliteal and all 3 tibial arteries are widely patent, with no evidence of thromboembolism, and there is good flow onto the posterior tibial and dorsalis pedis arteries of the left foot. 2.  The right femoral arteriogram is essentially normal, at the insertion site confirming common femoral artery placement. There is, as has been known previously, an approximately 50% stenosis of the proximal right superficial femoral artery, that is about 8 cm below the origin of the right superficial femoral artery. FINAL IMPRESSION:  Successful thrombolysis, with no residual thrombus or lesions present. Thrombolysis terminated.       MD DARYA Etienne / IAN  D: 01/17/2018 08:32     T: 01/17/2018 08:53  JOB #: [id number]

## 2018-01-17 NOTE — ANESTHESIA POSTPROCEDURE EVALUATION
Post-Anesthesia Evaluation and Assessment    Patient: Luis Manuel Poole MRN: [id number]  SSN: [id number]    YOB: 1965  Age: 50 y.o. Sex: female       Cardiovascular Function/Vital Signs  Visit Vitals    /79    Pulse 63    Temp 36.9 °C (98.4 °F)    Resp 14    Ht 5' 7\" (1.702 m)    Wt 105.6 kg (232 lb 12.9 oz)    SpO2 99%    BMI 36.46 kg/m2       Patient is status post total IV anesthesia anesthesia for Procedure(s):  LYSIS FOLLOW UP. Nausea/Vomiting: None    Postoperative hydration reviewed and adequate. Pain:  Pain Scale 1: Numeric (0 - 10) (01/17/18 0933)  Pain Intensity 1: 9 (01/17/18 0933)   Managed    Neurological Status:   Neuro (WDL): Within Defined Limits (01/16/18 2019)  Neuro  Neurologic State: Eyes open spontaneously (01/17/18 0917)  Orientation Level: Oriented X4 (01/17/18 0917)  Cognition: Follows commands;Poor safety awareness;Decreased attention/concentration (01/17/18 3069)  Speech: Clear (01/17/18 0917)  Assessment L Pupil: Brisk;Round (01/17/18 0917)  Size L Pupil (mm): 2 (01/17/18 0917)  Assessment R Pupil: Brisk;Round (01/17/18 0917)  Size R Pupil (mm): 2 (01/17/18 0917)  LUE Motor Response: Moderate; Purposeful (01/17/18 0917)  LLE Motor Response: Moderate; Purposeful (01/17/18 9915)  RUE Motor Response: Moderate; Purposeful (01/17/18 1894)  RLE Motor Response: Moderate; Purposeful (01/17/18 0917)   At baseline    Mental Status and Level of Consciousness: awake and at baseline    Pulmonary Status:   O2 Device: Nasal cannula (01/17/18 0917)   Adequate oxygenation and airway patent    Complications related to anesthesia: None    Post-anesthesia assessment completed.  No concerns    Signed By: Florence Aldana MD     January 17, 2018

## 2018-01-17 NOTE — BRIEF OP NOTE
BRIEF OPERATIVE NOTE    Date of Procedure: 1/17/2018   Preoperative Diagnosis: THROMBOSIS OF LEFT SUPERFICIAL ARTERY  Postoperative Diagnosis: same  Procedure(s):  LYSIS FOLLOW UP  Surgeon(s) and Role:     Sal Luna MD - Primary       Surgical Staff:  * No surgical staff found *  No case tracking events are documented in the log. Anesthesia: MAC   Estimated Blood Loss: 15 mL  Specimens: * No specimens in log *   Findings: Normal study. Lysis complete. Complications: none  Implants: * No implants in log *    Access site: ProGlide device, but hemostasis is not complete, so manual compression is applied.   Contrast: 26 mL Visipaque  Fluoro time: 2 min, 12 sec

## 2018-01-17 NOTE — PROGRESS NOTES
TRANSFER - OUT REPORT:    Verbal report given to Bettie Huang RN (name) on Sofia Hale  being transferred to CVICU (unit) for routine progression of care       Report consisted of patients Situation, Background, Assessment and   Recommendations(SBAR). Information from the following report(s) Procedure Summary and MAR was reviewed with the receiving nurse. Opportunity for questions and clarification was provided. Conscious Sedation:  Pt received MAC anesthesia for procedure. Pt tolerated procedure well.      Visit Vitals    /61 (BP 1 Location: Right arm, BP Patient Position: At rest)    Pulse 70    Temp 99.6 °F (37.6 °C)    Resp 15    Ht 5' 7\" (1.702 m)    Wt 105.6 kg (232 lb 12.9 oz)    SpO2 100%    BMI 36.46 kg/m2     Past Medical History:   Diagnosis Date    Arthritis     CAD (coronary artery disease) CABG 2009    cabg 2009-- stent x 1--2015-- followed by dr. Mcgee Simple Chronic back pain states cannot lie flat due to back pain    Chronic kidney disease     stents x 2--    Chronic obstructive pulmonary disease (HCC)     daily inhaler---- no home O2    Chronic pain     Coagulation defect ([address]     DDD (degenerative disc disease), lumbar     Depression     Fibromyalgia     GERD (gastroesophageal reflux disease)     controlled with med    Hypercholesterolemia     Hypertension     controlled with med    Joint ache     Left leg pain     Liver disease     enlarged per pt    Memory impairment     MI, old x 2    2009    Narcolepsy     Obesity (BMI 30-39.9)     36.6    Persistent insomnia      PVD (peripheral vascular disease) ([address]     S/P CABG x 4 2009    Stented coronary artery     EF 55-60% echo 12/15    Thyroid disease     hypo    Vertigo      Peripheral IV 01/16/18 Right Hand (Active)   Site Assessment Clean, dry, & intact 1/17/2018  7:00 AM   Phlebitis Assessment 0 1/17/2018  7:00 AM   Infiltration Assessment 0 1/17/2018  7:00 AM   Dressing Status Clean, dry, & intact 1/17/2018  7:00 AM   Dressing Type Tape;Transparent 1/17/2018  7:00 AM   Hub Color/Line Status Green;Capped;Flushed;Patent 1/17/2018  7:00 AM   Alcohol Cap Used No 1/17/2018  7:00 AM       Peripheral IV 01/16/18 Left Forearm (Active)   Site Assessment Clean, dry, & intact 1/17/2018  7:00 AM   Phlebitis Assessment 0 1/17/2018  7:00 AM   Infiltration Assessment 0 1/17/2018  7:00 AM   Dressing Status Clean, dry, & intact 1/17/2018  7:00 AM   Dressing Type Tape;Transparent 1/17/2018  7:00 AM   Hub Color/Line Status Green;Capped;Flushed;Patent 1/17/2018  7:00 AM   Alcohol Cap Used No 1/17/2018  7:00 AM

## 2018-01-17 NOTE — ANESTHESIA POSTPROCEDURE EVALUATION
Post-Anesthesia Evaluation and Assessment    Patient: Vincent Rizzo MRN: [id number]  SSN: [id number]    YOB: 1965  Age: 50 y.o. Sex: female       Cardiovascular Function/Vital Signs  Visit Vitals    /86    Pulse 71    Temp 37.1 °C (98.8 °F)    Resp 16    Ht 5' 7\" (1.702 m)    Wt 102.1 kg (225 lb)    SpO2 95%    BMI 35.24 kg/m2       Patient is status post general anesthesia for Procedure(s):  LEFT LEG ARTERIOGRAM, MECHANICAL THROMBECTOMY OF LEFT FEMORAL ARTERY, THROMBOLYSIS LEFT FEMORAL ARTERY. Nausea/Vomiting: None    Postoperative hydration reviewed and adequate. Pain:  Pain Scale 1: Numeric (0 - 10) (01/16/18 1558)  Pain Intensity 1: 9 (01/16/18 1558)   Managed    Neurological Status:   Neuro (WDL): Within Defined Limits (01/16/18 1603)   At baseline    Mental Status and Level of Consciousness: Arousable    Pulmonary Status:   O2 Device: Nasal cannula (01/16/18 1840)   Adequate oxygenation and airway patent    Complications related to anesthesia: None    Post-anesthesia assessment completed. No concerns. Ok to discharge to floor. BP now 178 sys after hydralazine and labetalol.      Signed By: Odalis Malagon MD     January 16, 2018

## 2018-01-17 NOTE — PROGRESS NOTES
TRANSFER - IN REPORT:    Verbal report received from [place], RN on [facility] Omar being received from PACU for routine progression of care      Report consisted of patients Situation, Background, Assessment and Recommendations(SBAR). Information from the following report(s) SBAR, Kardex, Procedure Summary, Intake/Output, Recent Results and Cardiac Rhythm NSR was reviewed with the receiving nurse. Opportunity for questions and clarification was provided. Assessment completed upon patients arrival to unit and care assumed.

## 2018-01-17 NOTE — PERIOP NOTES
Dr. Dash Carballo notified of pt continuous high BP, despite esmolol given in the OR and 2 doses of 10 mg hydralazine IV given in PACU, and pt's clear raspberry colored urine. Orders received for a cardene gtt. Dr. Dash Carballo explained that the bloody urine is most likely due hemolysis from the mechanical lysis done during surgery and that he expects this to clear.

## 2018-01-17 NOTE — PROGRESS NOTES
IR Nurse Pre-Procedure Checklist Part 2          Consent signed: Yes    H&P complete:  Yes    Antibiotics: Not applicable    Airway/Mallampati Done: PT receiving anesthesia. Shaved: Yes    Pregnancy Form:Follow up procedure.      Patient Position: Yes    MD Side: Yes     Biopsy Worksheet: Not applicable    Specimen Medium: Not applicable

## 2018-01-17 NOTE — OP NOTES
Myles [address]  MR#: [id number]  : 1965  ACCOUNT #: [de-identified]   DATE OF SERVICE: 2018    ANGIOGRAM REPORT     PREOPERATIVE DIAGNOSIS:  Thrombosis of left superficial femoral artery. POSTOPERATIVE DIAGNOSIS:  Thrombosis of left superficial femoral artery. PROCEDURES PERFORMED   1. Left superficial femoral and popliteal artery thrombolysis. 2.  Left superficial femoral and popliteal artery mechanical thrombectomy. 3.  Left lower extremity arteriogram.  4.  Ultrasound-guided access of right common femoral artery. SURGEON:  Cecily Murillo MD     ANESTHESIA:  General.    ESTIMATED BLOOD LOSS:  Less than 10 mL. SPECIMENS REMOVED:  None. COMPLICATIONS:  None. FLUOROSCOPY TIME:  9 minutes 18 seconds. CONTRAST DOSE:  50 mL of Isovue 300. STATEMENT OF MEDICAL NECESSITY:  The patient is a 56-year-old smoker with severe peripheral arterial disease who yesterday underwent uncomplicated intervention for her occluded left superficial femoral artery and high-grade stenosis of the mid left popliteal artery. Both of these lesions were treated with balloon angioplasty and stenting with a normal completion arteriogram.  She presented back today to the emergency department with left leg pain, which was primarily in the thigh. Ultrasound was then performed in our office, showing no evidence of hematoma, but the left superficial femoral artery stent was occluded. The popliteal artery stent appears to be patent. She does have a Doppler signal in the left foot at the dorsalis pedis artery, but has significant pain, so was taken emergently to the operating room for intervention. PROCEDURE:  The patient was taken to the hybrid operating room #12 and general anesthetic was administered. The groins were prepped and draped in the usual sterile fashion.   The skin was anesthetized at the right inguinal skin crease with 1% Xylocaine, and using direct ultrasound-guided access and micropuncture technique, the right common femoral artery was accessed with a single pass. The micropuncture wire was advanced under fluoroscopic guidance, then a micropuncture catheter was placed to advance an 0.035 Bentson wire into the proximal abdominal aorta. A 6-Swedish Hunter sheath was then placed and positioned to the level of the proximal right common iliac artery and then a 5-Swedish Omni Flush catheter was used to select the left common iliac artery and then an 0.035 Glidewire was advanced under fluoroscopic guidance into the left superficial femoral artery. The Omni Flush catheter was removed and a straight 5-Swedish Mark Rashad was placed over the wire into the superficial femoral artery and the sheath was advanced over the catheter into the left common femoral artery. The catheter was withdrawn and then imaging was obtained through the sheath of the left lower extremity. This confirmed occlusion of the superficial femoral artery, and patency of the popliteal artery, although there was diminished opacification because of the proximal occlusion. The tibial trifurcation also appears to be patent. The patient was then given 10,000 units of heparin intravenously, then 2 minutes later the clot in the SFA was crossed carefully with a 0.035 Glidewire and then followed with a Glidecatheter. Intraluminal placement in the popliteal artery was confirmed angiographically and showed no evidence of thrombus there. The 0.035 Bentson wire was then replaced and the catheter removed. Next, a Possis AngioJet mechanical thrombectomy device was used to deliver pulsed spray thrombolysis into the occluded stented segment.   Approximately 6 mg of TPA was administered with this technique, and then a followup arteriogram was performed, showing restoration of patency but still some residual thrombus, and also what appears to be thrombus within the stent at the popliteal artery, although this appears to be patent. The pulsed spray thrombectomy was then repeated and then another arteriogram obtained. This showed again that the stents were patent, but there was some thrombus scattered throughout both of them and more significantly now, there was embolic occlusion involving the peroneal and posterior tibial artery with a small amount of clot also in a patent anterior tibial artery. At this point, 9 mg of TPA had been administered and because of the diffuse nature of the thrombus, it was felt that continued overnight thrombolysis would be necessary. Next, a 135 cm length 5-Kyrgyz thrombolysis infusion catheter with a 30 cm length of working infusion length was then placed fluoroscopically and positioned in the proximal anterior tibial artery distally, and the upper portion of the working length above the superficial femoral artery stent. The catheter was irrigated and flushed with heparinized saline, as was the sheath, and then connected to TPA. The sheath was secured to the skin and marked with a Steri-Strip at the catheter position at the hub of the sheath. ANGIOGRAPHIC FINDINGS  1. The left superficial femoral artery was initially noted to be occluded. Distal runoff shows patency of the popliteal artery and proximal tibials. 2.  After thrombolysis as described, restoration of patency of the superficial femoral artery stent was achieved; however, there appears to be some thrombus distally in the popliteal artery as well as at the popliteal trifurcation area. FINAL IMPRESSION:  Thrombotic occlusion of left superficial femoral artery stent, now patent after thrombolysis and mechanical thrombectomy; however, there is evidence of distal thromboembolism in the popliteal artery and trifurcation area requiring ongoing thrombolysis.       MD DARYA Long / DOMINGA  D: 01/16/2018 18:29     T: 01/16/2018 19:36  JOB #: [id number]

## 2018-01-17 NOTE — PERIOP NOTES
TRANSFER - OUT REPORT:    Verbal report given to Karely MONTES(name) on Gisela Holloway  being transferred to [facility](unit) for routine post - op       Report consisted of patients Situation, Background, Assessment and   Recommendations(SBAR). Information from the following report(s) SBAR, Kardex, OR Summary, Procedure Summary, Intake/Output and MAR was reviewed with the receiving nurse. Lines:   Peripheral IV 01/16/18 Right Hand (Active)   Site Assessment Clean, dry, & intact 1/16/2018  6:40 PM   Phlebitis Assessment 0 1/16/2018  6:40 PM   Infiltration Assessment 0 1/16/2018  6:40 PM   Dressing Status Clean, dry, & intact 1/16/2018  6:40 PM   Dressing Type Tape;Transparent 1/16/2018  6:40 PM   Hub Color/Line Status Green; Infusing 1/16/2018  6:40 PM       Peripheral IV 01/16/18 Left Forearm (Active)   Site Assessment Clean, dry, & intact 1/16/2018  6:40 PM   Phlebitis Assessment 0 1/16/2018  6:40 PM   Infiltration Assessment 0 1/16/2018  6:40 PM   Dressing Status Clean, dry, & intact 1/16/2018  6:40 PM   Dressing Type Tape;Transparent 1/16/2018  6:40 PM   Hub Color/Line Status Green;Capped 1/16/2018  6:40 PM        Opportunity for questions and clarification was provided. Patient transported with:   O2 @ 3 liters   Monitor,RN    VTE prophylaxis orders have not been written for Gisela Holloway. Patient and family given floor number and nurses name. Family updated re: pt status after security code verified.

## 2018-01-17 NOTE — PROGRESS NOTES
TRANSFER - OUT REPORT:    Verbal report given to SIMON Grossman on [facility]  being transferred to IR for routine progression of care       Report consisted of patients Situation, Background, Assessment and Recommendations(SBAR). Information from the following report(s) SBAR, Kardex, [facility] and Recent Results was reviewed with the receiving nurse. Opportunity for questions and clarification was provided.

## 2018-01-17 NOTE — PROGRESS NOTES
TRANSFER - IN REPORT:    Verbal report received from [place], RN on [facility] being received from IR for routine progression of care      Report consisted of patients Situation, Background, Assessment and Recommendations(SBAR). Information from the following report(s) SBAR, Kardex, [facility] and Recent Results was reviewed with the receiving nurse. Opportunity for questions and clarification was provided. Assessment completed upon patients arrival to unit and care assumed.

## 2018-01-17 NOTE — PROGRESS NOTES
Admission skin assessment completed with second RN and reveals the following: skin is warm and dry. Patient has a right arterial sheath in place that is c/d/i. Sacrum is intact, allevyn place for prevention, for patient is on strict bedrest.  Heels are intact. Multiple tattoos noted. Great toe on left foot, appears to have an ingrown toe nail.

## 2018-01-17 NOTE — PROGRESS NOTES
PTT noted to be a critical result. MD not notified because patient was given a heparin bolus in the OR at 1708. This Lab was to be draw 6 hours post bolus dose. Called and verified with the pharmacist Liegh Cheatham orders placed for 2300. Will reevaluate levels at this time.

## 2018-01-18 VITALS
RESPIRATION RATE: 24 BRPM | SYSTOLIC BLOOD PRESSURE: 121 MMHG | WEIGHT: 231.48 LBS | HEART RATE: 78 BPM | TEMPERATURE: 98.5 F | BODY MASS INDEX: 36.33 KG/M2 | HEIGHT: 67 IN | OXYGEN SATURATION: 97 % | DIASTOLIC BLOOD PRESSURE: 67 MMHG

## 2018-01-18 LAB
ANION GAP SERPL CALC-SCNC: 8 MMOL/L (ref 7–16)
APTT PPP: 116.1 SEC (ref 23.2–35.3)
APTT PPP: 76.1 SEC (ref 23.2–35.3)
BUN SERPL-MCNC: 15 MG/DL (ref 6–23)
CALCIUM SERPL-MCNC: 7.7 MG/DL (ref 8.3–10.4)
CHLORIDE SERPL-SCNC: 110 MMOL/L (ref 98–107)
CO2 SERPL-SCNC: 26 MMOL/L (ref 21–32)
CREAT SERPL-MCNC: 0.69 MG/DL (ref 0.6–1)
ERYTHROCYTE [DISTWIDTH] IN BLOOD BY AUTOMATED COUNT: 13.3 % (ref 11.9–14.6)
GLUCOSE SERPL-MCNC: 116 MG/DL (ref 65–100)
HCT VFR BLD AUTO: 34 % (ref 35.8–46.3)
HGB BLD-MCNC: 11.2 G/DL (ref 11.7–15.4)
MAGNESIUM SERPL-MCNC: 1.6 MG/DL (ref 1.8–2.4)
MCH RBC QN AUTO: 29.7 PG (ref 26.1–32.9)
MCHC RBC AUTO-ENTMCNC: 32.9 G/DL (ref 31.4–35)
MCV RBC AUTO: 90.2 FL (ref 79.6–97.8)
PLATELET # BLD AUTO: 222 K/UL (ref 150–450)
PMV BLD AUTO: 10.6 FL (ref 10.8–14.1)
POTASSIUM SERPL-SCNC: 3.4 MMOL/L (ref 3.5–5.1)
RBC # BLD AUTO: 3.77 M/UL (ref 4.05–5.25)
SODIUM SERPL-SCNC: 144 MMOL/L (ref 136–145)
WBC # BLD AUTO: 15.7 K/UL (ref 4.3–11.1)

## 2018-01-18 PROCEDURE — 74011250637 HC RX REV CODE- 250/637: Performed by: SURGERY

## 2018-01-18 PROCEDURE — 80048 BASIC METABOLIC PNL TOTAL CA: CPT | Performed by: SURGERY

## 2018-01-18 PROCEDURE — 94640 AIRWAY INHALATION TREATMENT: CPT

## 2018-01-18 PROCEDURE — 85027 COMPLETE CBC AUTOMATED: CPT | Performed by: SURGERY

## 2018-01-18 PROCEDURE — 74011000250 HC RX REV CODE- 250: Performed by: SURGERY

## 2018-01-18 PROCEDURE — 74011250636 HC RX REV CODE- 250/636: Performed by: SURGERY

## 2018-01-18 PROCEDURE — 74011250636 HC RX REV CODE- 250/636: Performed by: THORACIC SURGERY (CARDIOTHORACIC VASCULAR SURGERY)

## 2018-01-18 PROCEDURE — 85730 THROMBOPLASTIN TIME PARTIAL: CPT | Performed by: SURGERY

## 2018-01-18 PROCEDURE — 36415 COLL VENOUS BLD VENIPUNCTURE: CPT | Performed by: SURGERY

## 2018-01-18 PROCEDURE — 83735 ASSAY OF MAGNESIUM: CPT | Performed by: SURGERY

## 2018-01-18 RX ORDER — MAGNESIUM SULFATE HEPTAHYDRATE 40 MG/ML
2 INJECTION, SOLUTION INTRAVENOUS ONCE
Status: COMPLETED | OUTPATIENT
Start: 2018-01-18 | End: 2018-01-18

## 2018-01-18 RX ORDER — POTASSIUM CHLORIDE 20MEQ/15ML
40 LIQUID (ML) ORAL 2 TIMES DAILY
Status: DISCONTINUED | OUTPATIENT
Start: 2018-01-18 | End: 2018-01-18

## 2018-01-18 RX ORDER — POTASSIUM CHLORIDE 20 MEQ/1
40 TABLET, EXTENDED RELEASE ORAL
Status: COMPLETED | OUTPATIENT
Start: 2018-01-18 | End: 2018-01-18

## 2018-01-18 RX ADMIN — SODIUM CHLORIDE 500 ML: 900 INJECTION, SOLUTION INTRAVENOUS at 03:06

## 2018-01-18 RX ADMIN — PHENYLEPHRINE HYDROCHLORIDE 80 MCG/MIN: 10 INJECTION INTRAVENOUS at 07:07

## 2018-01-18 RX ADMIN — POTASSIUM CHLORIDE 40 MEQ: 20 TABLET, EXTENDED RELEASE ORAL at 08:54

## 2018-01-18 RX ADMIN — WATER 1 G: 1 INJECTION INTRAMUSCULAR; INTRAVENOUS; SUBCUTANEOUS at 08:30

## 2018-01-18 RX ADMIN — BUDESONIDE 500 MCG: 0.5 INHALANT RESPIRATORY (INHALATION) at 08:21

## 2018-01-18 RX ADMIN — CLONAZEPAM 1 MG: 1 TABLET ORAL at 01:19

## 2018-01-18 RX ADMIN — ASPIRIN 81 MG: 81 TABLET, COATED ORAL at 08:54

## 2018-01-18 RX ADMIN — RIVAROXABAN 20 MG: 20 TABLET, FILM COATED ORAL at 10:13

## 2018-01-18 RX ADMIN — PRASUGREL HYDROCHLORIDE 10 MG: 10 TABLET, FILM COATED ORAL at 08:54

## 2018-01-18 RX ADMIN — ACETAMINOPHEN 500 MG: 500 TABLET, FILM COATED ORAL at 05:31

## 2018-01-18 RX ADMIN — PANTOPRAZOLE SODIUM 40 MG: 40 TABLET, DELAYED RELEASE ORAL at 05:32

## 2018-01-18 RX ADMIN — ALBUTEROL SULFATE 2.5 MG: 2.5 SOLUTION RESPIRATORY (INHALATION) at 08:21

## 2018-01-18 RX ADMIN — WATER 1 G: 1 INJECTION INTRAMUSCULAR; INTRAVENOUS; SUBCUTANEOUS at 00:15

## 2018-01-18 RX ADMIN — RANOLAZINE 500 MG: 500 TABLET, FILM COATED, EXTENDED RELEASE ORAL at 08:54

## 2018-01-18 RX ADMIN — LEVOTHYROXINE SODIUM 25 MCG: 50 TABLET ORAL at 05:31

## 2018-01-18 RX ADMIN — HEPARIN SODIUM AND DEXTROSE 18.09 UNITS/KG/HR: 5000; 5 INJECTION INTRAVENOUS at 03:07

## 2018-01-18 RX ADMIN — MAGNESIUM SULFATE HEPTAHYDRATE 2 G: 40 INJECTION, SOLUTION INTRAVENOUS at 06:20

## 2018-01-18 NOTE — DISCHARGE INSTRUCTIONS
ANGIOGRAPHY DISCHARGE INSTRUCTIONS    1. Check puncture site frequently for swelling or bleeding. If there is any bleeding, lie down and apply pressure over the area with a clean towel or washcloth. Notify your doctor for any redness, swelling, drainage, or oozing from the puncture site. Notify your doctor for any fever or chills. 2. If the extremity becomes cold, numb, or painful call Dr. Larry Ruiz at [phone].  3. Activity should be limited for the next 48 hours. Climb stairs as little as possible and avoid any stooping, bending, or strenuous activity for 48 hours. No heavy lifting (anything over 10 pounds) for 3 days. 4. You may resume your usual diet. Drink more fluids than usual.  5. Have a responsible person drive you home and stay with you for at least 24 hours after your heart catheterization/angiography. 6. You may remove bandage from your Right groin in 24 hours. You may shower in 24 hours. No tub baths, hot tubs, or swimming for 1 week. Do not place any lotions, creams, powders, or ointments over puncture site for 1 week. You may place a clean band-aid over the puncture site each day for 5 days. Change daily. I have read the above instructions and have had the opportunity to ask questions. Patient: ________________________   Date: 1/18/2018    Witness: _______________________   Date: 1/18/2018  Anticoagulants: After Your Visit  Your Care Instructions  Your doctor prescribed an anticoagulant medicine. Anticoagulants, often called blood thinners, prevent new blood clots from forming and keep existing clots from getting larger. They do not actually thin the blood, but they make the blood take longer to clot. This lowers the risk of a blood clot moving to the lungs (pulmonary embolism) or moving to the brain and causing a stroke. Blood thinners come in two forms. Heparin is given by shot, either under your skin or through a needle in your vein, and starts working right away.  Warfarin (Coumadin) comes in pill form and takes longer to work. Your doctor may have you begin taking both forms at the same time. As soon as the pills start to work, you will stop the shots but continue to take the pills. If you have a blood clot in your leg, you may need to take warfarin for several months. People who have heart conditions such as atrial fibrillation often need to take it for the rest of their lives. The right dose of this medicine is different for each person. You will need regular blood tests to see if your dose is correct. Follow-up care is a key part of your treatment and safety. Be sure to make and go to all appointments, and call your doctor if you are having problems. Itâs also a good idea to know your test results and keep a list of the medicines you take. How do you take blood thinners? · Take your medicines exactly as prescribed. Call your doctor if you think you are having a problem with your medicine. · Call your doctor if you are not sure what to do if you missed a dose of blood thinner. ¨ Your doctor can tell you exactly what to do so you do not take too much or too little blood thinner. Then you will be as safe as possible. · Some general rules for what to do if you miss a dose:  ¨ If you remember it in the same day, take the missed dose. Then go back to your regular schedule. ¨ If it is the next day, or almost time to take the next dose, do not take the missed dose. Do not double the dose to make up for the missed one. At your next regularly scheduled time, take your normal dose. ¨ If you miss your dose for 2 or more days, call your doctor. · To help you stay on schedule, use a calendar to remind you when to take your blood thinner. When you take the medicine, note it on the calendar. · If you are going to give yourself shots, your doctor will give you instructions for how to safely inject the medicine. Follow the directions carefully.   · Do not take any vitamins, over-the-counter medicines, or herbal products without talking to your doctor first.  · Avoid contact sports and other activities that could lead to injury. Make your home safe and take other measures to reduce your risk of falling. Always wear a seat belt while in a car. · Do not suddenly change your intake of vitamin Kârich foods, such as broccoli, cabbage, asparagus, lettuce, and spinach. This will help blood thinners work evenly from day to day. · Limit alcohol to 2 drinks a day for men and 1 drink a day for women. Alcohol may interfere with blood thinners. It also increases your risk of falls, which can cause bruising and bleeding. · Tell your dentist, pharmacist, and other health professionals that you take blood thinners. Wear medical alert jewelry that says you take blood thinners. You can buy this at most [employer]. When should you call for help? Call 911 anytime you think you may need emergency care. For example, call if:  · You cough up blood. · You vomit blood or what looks like coffee grounds. · You pass maroon or very bloody stools. Call your doctor now or seek immediate medical care if:  · You have new bruises or blood spots under your skin. · You have a nosebleed. · Your gums bleed when you brush your teeth. · You have blood in your urine. · Your stools are black and tarlike or have streaks of blood. · You have vaginal bleeding when you are not having your period, or heavy period bleeding. Watch closely for changes in your health, and be sure to contact your doctor if:  · You have questions about your medicine. Where can you learn more? Go to [employer]  Enter N297 in the search box to learn more about \"Anticoagulants: After Your Visit. \"   Â© 3189-2445 [employer]wise, [employer]orporated. Care instructions adapted under license by [employer] (which disclaims liability or warranty for this information).  This care instruction is for use with your licensed healthcare professional. If you have questions about a medical condition or this instruction, always ask your healthcare professional. [place] [address] any warranty or liability for your use of this information.   Content Version: 9.4.47285; Last Revised: July 1, 2009

## 2018-01-18 NOTE — PROGRESS NOTES
I have reviewed discharge instructions with the patient. The patient verbalized understanding. Discharged to home accompanied by spouse.

## 2018-01-18 NOTE — PROGRESS NOTES
Patient stating \"I will not stay here another day\"  Explained to patient her need for vasoactive gtt, \"I don't care\" Patient is becoming hateful. BP has been low most of shift except when stimulated. Thierno currently running at 80mcg/min. With BP still in the low 100s.   Patient urinating, labs in process, will monitor

## 2018-01-18 NOTE — PROGRESS NOTES
POD #1 s/p lysis  She is very anxious - insists on going home - no clear reason offered. Blood pressure 145/72, pulse 74, temperature 99.8 °F (37.7 °C), resp. rate 28, height 5' 7\" (1.702 m), weight 231 lb 7.7 oz (105 kg), SpO2 99 %. Weaning off of leander    CV - RRR  R fem site - normal, no hematoma  L foot is warm, palpable pulse    A/P:   Wean off leander  Start Xarelto  Stop heparin  Likely DC today.

## 2018-01-18 NOTE — PROGRESS NOTES
Blood pressure still in 80s, on increasing leander. Dosage. Patient is warm, dry and making urine. Will give saline bolus, monitor.

## 2018-01-18 NOTE — DISCHARGE SUMMARY
Physician Discharge Summary     Patient ID:  Arbutus Osgood  [id number]  50 y.o.  1965    Allergies: Review of patient's allergies indicates no known allergies. Admit date: 1/16/2018    Discharge date: 1/18/2018      Admitting Physician: Margaret Hashimoto, MD     Discharge Physician: Margaret Hashimoto, MD    * Admission Diagnoses: Occluded Stent; Thrombosis of left femoral artery (HCC);THRO*    * Discharge Diagnoses:   Hospital Problems as of 1/18/2018  Date Reviewed: 1/16/2018          Codes Class Noted - Resolved POA    * (Principal)Thrombosis of left femoral artery ([facility] [address] ICD-10-CM: I74.3  ICD-9-CM: 444.22  1/16/2018 - Present Yes              * Procedures for this admission: Procedure(s):  LYSIS FOLLOW UP  Cardiology and IR Orders (Through next 24h)    Start     Ordered    01/17/18 0915  IR REMOVE THER TXCATH INF THROMB CESSATION  [[id number]]  ONE TIME      01/17/18 0554    01/16/18 1630  IR THROMBOLYSIS TRANSCATH NON CORONARY OR INTRACRANIAL  [[id number]]  ONE TIME      01/16/18 1616          * Discharged Condition: good    * Hospital Course: Underwent arterial thrombolysis on day of admission. There was residual thrombus at end of first treatment so infusion continued overnight. Brought back to IR yesterday AM for follow up lysis. A-gram was normal - no thrombus, both stents patent, normal 3-v runoff, no technical issues with stents, no stenosis in LLE. Started on IV heparin that afternoon (yesterday). Left foot remains normal with palpable pulse. After leander weaned off, will start Xarelto, stop heparin and DC home. Plan to continue Xarelto for 3 months only. Treatments: thrombolysis left leg (arterial). Discharge Exam: GEN: alert, anxious. Insists on going home for unstated reasons. HEART: regular rate and rhythm, S1, S2 normal, no murmur, click, rub or gallop  L foot is warm with palpable pulse.   R fem site - no hematoma    * Disposition: Home    Discharge Medications:   Current Discharge Medication List      CONTINUE these medications which have NOT CHANGED    Details   meclizine (ANTIVERT) 25 mg tablet One tab three times a day as needed for vertigo  Qty: 15 Tab, Refills: 0    Associated Diagnoses: Vertigo      levothyroxine (SYNTHROID) 25 mcg tablet Take 1 Tab by mouth Daily (before breakfast). Qty: 90 Tab, Refills: 0    Associated Diagnoses: Acquired hypothyroidism      omeprazole (PRILOSEC) 20 mg capsule TAKE 1 CAPSULE BY MOUTH DAILY. INDICATIONS: GASTROESOPHAGEAL REFLUX, HEARTBURN  Qty: 30 Cap, Refills: 2      furosemide (LASIX) 40 mg tablet TAKE 1 TABLET BY MOUTH EVERY DAY  Qty: 30 Tab, Refills: 1      clobetasol (TEMOVATE) 0.05 % topical cream Apply  to affected area two (2) times a day. Qty: 60 g, Refills: 2    Associated Diagnoses: Eczema, dyshidrotic      aspirin delayed-release 81 mg tablet Take 81 mg by mouth daily. ranolazine ER (RANEXA) 500 mg SR tablet Take 500 mg by mouth daily. amLODIPine (NORVASC) 10 mg tablet TAKE 1 TABLET (10 MG) BY MOUTH DAILY. Refills: 11      albuterol (PROVENTIL HFA, VENTOLIN HFA, PROAIR HFA) 90 mcg/actuation inhaler Take 2 Puffs by inhalation every six (6) hours as needed for Wheezing or Shortness of Breath. Qty: 1 Inhaler, Refills: 0      olmesartan (BENICAR) 40 mg tablet Take 1 Tab by mouth every evening. Qty: 30 Tab, Refills: 6    Associated Diagnoses: Essential hypertension      atorvastatin (LIPITOR) 80 mg tablet Take 80 mg by mouth nightly. carvedilol (COREG) 12.5 mg tablet Take 12.5 mg by mouth two (2) times a day. isosorbide mononitrate ER (IMDUR) 60 mg CR tablet Take 60 mg by mouth two (2) times a day. prasugrel (EFFIENT) 10 mg tablet Take 10 mg by mouth daily. multivitamin (ONE A DAY) tablet Take 1 Tab by mouth daily. Stopped 1/12/18  Indications: did not hold      clonazePAM (KLONOPIN) 1 mg tablet Take 1 Tab by mouth daily as needed.   Qty: 30 Tab, Refills: 0    Associated Diagnoses: Anxiety      Armodafinil (NUVIGIL) 250 mg tab tablet Take 1 Tab by mouth Daily (before breakfast). Max Daily Amount: 250 mg.  Qty: 30 Tab, Refills: 5      budesonide-formoterol (SYMBICORT) 160-4.5 mcg/actuation HFA inhaler Take 2 Puffs by inhalation two (2) times a day. Qty: 1 Inhaler, Refills: 0      nitroglycerin (NITROSTAT) 0.4 mg SL tablet 1 Tab by SubLINGual route every five (5) minutes as needed. Qty: 25 Tab, Refills: 3    Associated Diagnoses: Coronary artery disease involving native coronary artery without angina pectoris           NEW Med: Xarelto 20 mg PO daily X 3 months      * Follow-up Care/Patient Instructions: Activity: See surgical instructions  Diet: Cardiac Diet  Wound Care: remove right groin dressing tomorrow. Gently wash at least daily, cover site with clean bandaid for at least one week, change daily after cleansing. Stop smoking!     Follow-up Information     None          Signed:  Elisa Escobedo MD  1/18/2018  8:17 AM

## 2018-01-18 NOTE — PROGRESS NOTES
Dr. Derick Anthony notified of patient's blood pressure, former sheath site (benign),recieving volume ns now. Orders received.

## 2018-01-19 ENCOUNTER — HOSPITAL ENCOUNTER (EMERGENCY)
Age: 53
Discharge: LEFT BEFORE DISCHARGE | End: 2018-01-19
Attending: EMERGENCY MEDICINE
Payer: MEDICARE

## 2018-01-19 PROCEDURE — 75810000275 HC EMERGENCY DEPT VISIT NO LEVEL OF CARE: Performed by: EMERGENCY MEDICINE

## 2018-01-26 ENCOUNTER — HOSPITAL ENCOUNTER (EMERGENCY)
Age: 53
Discharge: HOME | End: 2018-01-26
Attending: EMERGENCY MEDICINE
Payer: MEDICARE

## 2018-01-26 ENCOUNTER — APPOINTMENT (OUTPATIENT)
Dept: GENERAL RADIOLOGY | Age: 53
End: 2018-01-26
Attending: EMERGENCY MEDICINE
Payer: MEDICARE

## 2018-01-26 VITALS
OXYGEN SATURATION: 98 % | BODY MASS INDEX: 36.1 KG/M2 | SYSTOLIC BLOOD PRESSURE: 146 MMHG | HEIGHT: 67 IN | WEIGHT: 230 LBS | DIASTOLIC BLOOD PRESSURE: 88 MMHG | RESPIRATION RATE: 18 BRPM | HEART RATE: 76 BPM | TEMPERATURE: 98.7 F

## 2018-01-26 DIAGNOSIS — M94.0 COSTOCHONDRITIS: Primary | ICD-10-CM

## 2018-01-26 LAB
ALBUMIN SERPL-MCNC: 3.6 G/DL (ref 3.5–5)
ALBUMIN/GLOB SERPL: 0.9 {RATIO} (ref 1.2–3.5)
ALP SERPL-CCNC: 44 U/L (ref 50–136)
ALT SERPL-CCNC: 34 U/L (ref 12–65)
ANION GAP SERPL CALC-SCNC: 8 MMOL/L (ref 7–16)
AST SERPL-CCNC: 22 U/L (ref 15–37)
ATRIAL RATE: 75 BPM
BASOPHILS # BLD: 0.1 K/UL (ref 0–0.2)
BASOPHILS NFR BLD: 1 % (ref 0–2)
BILIRUB SERPL-MCNC: 0.2 MG/DL (ref 0.2–1.1)
BUN SERPL-MCNC: 5 MG/DL (ref 6–23)
CALCIUM SERPL-MCNC: 9 MG/DL (ref 8.3–10.4)
CALCULATED P AXIS, ECG09: 64 DEGREES
CALCULATED R AXIS, ECG10: 61 DEGREES
CALCULATED T AXIS, ECG11: 90 DEGREES
CHLORIDE SERPL-SCNC: 102 MMOL/L (ref 98–107)
CO2 SERPL-SCNC: 29 MMOL/L (ref 21–32)
CREAT SERPL-MCNC: 0.82 MG/DL (ref 0.6–1)
CRP SERPL-MCNC: 2.7 MG/DL (ref 0–0.9)
DIAGNOSIS, 93000: NORMAL
DIFFERENTIAL METHOD BLD: NORMAL
EOSINOPHIL # BLD: 0.2 K/UL (ref 0–0.8)
EOSINOPHIL NFR BLD: 2 % (ref 0.5–7.8)
ERYTHROCYTE [DISTWIDTH] IN BLOOD BY AUTOMATED COUNT: 13.2 % (ref 11.9–14.6)
GLOBULIN SER CALC-MCNC: 4 G/DL (ref 2.3–3.5)
GLUCOSE SERPL-MCNC: 101 MG/DL (ref 65–100)
HCT VFR BLD AUTO: 37.6 % (ref 35.8–46.3)
HGB BLD-MCNC: 12.4 G/DL (ref 11.7–15.4)
IMM GRANULOCYTES # BLD: 0 K/UL (ref 0–0.5)
IMM GRANULOCYTES NFR BLD AUTO: 0 % (ref 0–5)
LYMPHOCYTES # BLD: 2.4 K/UL (ref 0.5–4.6)
LYMPHOCYTES NFR BLD: 25 % (ref 13–44)
MCH RBC QN AUTO: 29.4 PG (ref 26.1–32.9)
MCHC RBC AUTO-ENTMCNC: 33 G/DL (ref 31.4–35)
MCV RBC AUTO: 89.1 FL (ref 79.6–97.8)
MONOCYTES # BLD: 0.6 K/UL (ref 0.1–1.3)
MONOCYTES NFR BLD: 7 % (ref 4–12)
NEUTS SEG # BLD: 6.3 K/UL (ref 1.7–8.2)
NEUTS SEG NFR BLD: 65 % (ref 43–78)
P-R INTERVAL, ECG05: 154 MS
PLATELET # BLD AUTO: 301 K/UL (ref 150–450)
PMV BLD AUTO: 11 FL (ref 10.8–14.1)
POTASSIUM SERPL-SCNC: 4 MMOL/L (ref 3.5–5.1)
PROT SERPL-MCNC: 7.6 G/DL (ref 6.3–8.2)
Q-T INTERVAL, ECG07: 378 MS
QRS DURATION, ECG06: 90 MS
QTC CALCULATION (BEZET), ECG08: 422 MS
RBC # BLD AUTO: 4.22 M/UL (ref 4.05–5.25)
SODIUM SERPL-SCNC: 139 MMOL/L (ref 136–145)
TROPONIN I BLD-MCNC: 0 NG/ML (ref 0.02–0.05)
VENTRICULAR RATE, ECG03: 75 BPM
WBC # BLD AUTO: 9.5 K/UL (ref 4.3–11.1)

## 2018-01-26 PROCEDURE — 80053 COMPREHEN METABOLIC PANEL: CPT | Performed by: EMERGENCY MEDICINE

## 2018-01-26 PROCEDURE — 85025 COMPLETE CBC W/AUTO DIFF WBC: CPT | Performed by: EMERGENCY MEDICINE

## 2018-01-26 PROCEDURE — 71046 X-RAY EXAM CHEST 2 VIEWS: CPT

## 2018-01-26 PROCEDURE — 86140 C-REACTIVE PROTEIN: CPT | Performed by: EMERGENCY MEDICINE

## 2018-01-26 PROCEDURE — 84484 ASSAY OF TROPONIN QUANT: CPT

## 2018-01-26 PROCEDURE — 93005 ELECTROCARDIOGRAM TRACING: CPT | Performed by: EMERGENCY MEDICINE

## 2018-01-26 PROCEDURE — 99282 EMERGENCY DEPT VISIT SF MDM: CPT | Performed by: EMERGENCY MEDICINE

## 2018-01-26 RX ORDER — ACETAMINOPHEN AND CODEINE PHOSPHATE 300; 30 MG/1; MG/1
1 TABLET ORAL
Qty: 12 TAB | Refills: 0 | Status: SHIPPED | OUTPATIENT
Start: 2018-01-26

## 2018-01-26 NOTE — ED NOTES
I have reviewed discharge instructions with the patient. The patient verbalized understanding. Patient left ED via Discharge Method: ambulatory to Home with self. Opportunity for questions and clarification provided. Patient given 1 scripts. To continue your aftercare when you leave the hospital, you may receive an automated call from our care team to check in on how you are doing. This is a free service and part of our promise to provide the best care and service to meet your aftercare needs.  If you have questions, or wish to unsubscribe from this service please call [phone]. Thank you for Choosing our [facility].

## 2018-01-26 NOTE — DISCHARGE INSTRUCTIONS
Costochondritis: Care Instructions  Your Care Instructions  You have chest pain because the cartilage of your rib cage is inflamed. This problem is called costochondritis. This type of chest wall pain may last from days to weeks. It is not a heart problem. Sometimes costochondritis occurs with a cold or the flu, and other times the exact cause is not known. Follow-up care is a key part of your treatment and safety. Be sure to make and go to all appointments, and call your doctor if you are having problems. It's also a good idea to know your test results and keep a list of the medicines you take. How can you care for yourself at home? · Take medicines for pain and inflammation exactly as directed. ¨ If the doctor gave you a prescription medicine, take it as prescribed. ¨ If you are not taking a prescription pain medicine, ask your doctor if you can take an over-the-counter medicine. ¨ Do not take two or more pain medicines at the same time unless the doctor told you to. Many pain medicines have acetaminophen, which is Tylenol. Too much acetaminophen (Tylenol) can be harmful. · It may help to use a warm compress or heating pad (set on low) on your chest. You can also try alternating heat and ice. Put ice or a cold pack on the area for 10 to 20 minutes at a time. Put a thin cloth between the ice and your skin. · Avoid any activity that strains the chest area. As your pain gets better, you can slowly return to your normal activities. · Do not use tape, an elastic bandage, a \"rib belt,\" or anything else that restricts your chest wall motion. When should you call for help? Call 911 anytime you think you may need emergency care. For example, call if:  ? · You have new or different chest pain or pressure. This may occur with:  ¨ Sweating. ¨ Shortness of breath. ¨ Nausea or vomiting. ¨ Pain that spreads from the chest to the neck, jaw, or one or both shoulders or arms. ¨ Dizziness or lightheadedness.   ¨ A fast or uneven pulse. After calling 911, chew 1 adult-strength aspirin. Wait for an ambulance. Do not try to drive yourself. ? · You have severe trouble breathing. ?Call your doctor now or seek immediate medical care if:  ? · You have a fever or cough. ? · You have any trouble breathing. ? · Your chest pain gets worse. ? Watch closely for changes in your health, and be sure to contact your doctor if:  ? · Your chest pain continues even though you are taking anti-inflammatory medicine. ? · Your chest wall pain has not improved after 5 to 7 days. Where can you learn more? Go to http://mitchell-karen.info/. Enter V193 in the search box to learn more about \"Costochondritis: Care Instructions. \"  Current as of: March 20, 2017  Content Version: 11.4  © 3456-4551 [employer]. Care instructions adapted under license by [employer] (which disclaims liability or warranty for this information). If you have questions about a medical condition or this instruction, always ask your healthcare professional. [address] any warranty or liability for your use of this information.

## 2018-01-26 NOTE — ED TRIAGE NOTES
Pt in states midsternal chest pain radiating into back starting yesterday. States she took nitro with slight relief. States took 81mg aspirin today.

## 2018-02-28 ENCOUNTER — HOSPITAL ENCOUNTER (EMERGENCY)
Age: 53
Discharge: HOME | DRG: 316 | End: 2018-02-28
Attending: EMERGENCY MEDICINE
Payer: MEDICARE

## 2018-02-28 VITALS
BODY MASS INDEX: 34.53 KG/M2 | HEIGHT: 67 IN | HEART RATE: 76 BPM | TEMPERATURE: 98 F | OXYGEN SATURATION: 98 % | WEIGHT: 220 LBS | SYSTOLIC BLOOD PRESSURE: 124 MMHG | DIASTOLIC BLOOD PRESSURE: 70 MMHG | RESPIRATION RATE: 18 BRPM

## 2018-02-28 DIAGNOSIS — M79.605 LEFT LEG PAIN: Primary | ICD-10-CM

## 2018-02-28 PROCEDURE — 99283 EMERGENCY DEPT VISIT LOW MDM: CPT | Performed by: EMERGENCY MEDICINE

## 2018-02-28 NOTE — ED TRIAGE NOTES
Pt states she has some stents in left leg for about one month. Started having pain in the back of left leg today and it is not getting better and is now making left foot feel numb.

## 2018-03-01 ENCOUNTER — ANESTHESIA (OUTPATIENT)
Dept: SURGERY | Age: 53
DRG: 316 | End: 2018-03-01
Payer: MEDICARE

## 2018-03-01 ENCOUNTER — APPOINTMENT (OUTPATIENT)
Dept: ULTRASOUND IMAGING | Age: 53
DRG: 316 | End: 2018-03-01
Payer: MEDICARE

## 2018-03-01 ENCOUNTER — APPOINTMENT (OUTPATIENT)
Dept: INTERVENTIONAL RADIOLOGY/VASCULAR | Age: 53
DRG: 316 | End: 2018-03-01
Attending: EMERGENCY MEDICINE
Payer: MEDICARE

## 2018-03-01 ENCOUNTER — ANESTHESIA EVENT (OUTPATIENT)
Dept: SURGERY | Age: 53
DRG: 316 | End: 2018-03-01
Payer: MEDICARE

## 2018-03-01 ENCOUNTER — APPOINTMENT (OUTPATIENT)
Dept: CT IMAGING | Age: 53
DRG: 316 | End: 2018-03-01
Attending: EMERGENCY MEDICINE
Payer: MEDICARE

## 2018-03-01 ENCOUNTER — APPOINTMENT (OUTPATIENT)
Dept: INTERVENTIONAL RADIOLOGY/VASCULAR | Age: 53
DRG: 316 | End: 2018-03-01
Attending: SURGERY
Payer: MEDICARE

## 2018-03-01 ENCOUNTER — HOSPITAL ENCOUNTER (INPATIENT)
Age: 53
LOS: 4 days | Discharge: HOME | DRG: 316 | End: 2018-03-05
Attending: EMERGENCY MEDICINE | Admitting: SURGERY
Payer: MEDICARE

## 2018-03-01 ENCOUNTER — HOSPITAL ENCOUNTER (EMERGENCY)
Age: 53
Discharge: OTHER INSTITUTION | DRG: 316 | End: 2018-03-01
Payer: MEDICARE

## 2018-03-01 VITALS
BODY MASS INDEX: 34.53 KG/M2 | HEIGHT: 67 IN | HEART RATE: 75 BPM | OXYGEN SATURATION: 96 % | SYSTOLIC BLOOD PRESSURE: 143 MMHG | DIASTOLIC BLOOD PRESSURE: 70 MMHG | WEIGHT: 220 LBS | TEMPERATURE: 97.8 F | RESPIRATION RATE: 14 BRPM

## 2018-03-01 DIAGNOSIS — I99.8 ISCHEMIA OF LEFT LOWER EXTREMITY: Primary | ICD-10-CM

## 2018-03-01 DIAGNOSIS — I99.8 ISCHEMIA OF LEFT LOWER EXTREMITY: ICD-10-CM

## 2018-03-01 DIAGNOSIS — I70.90 ARTERIAL OCCLUSION: Primary | ICD-10-CM

## 2018-03-01 LAB
ALBUMIN SERPL-MCNC: 3.9 G/DL (ref 3.5–5)
ALBUMIN/GLOB SERPL: 1 {RATIO} (ref 1.2–3.5)
ALP SERPL-CCNC: 55 U/L (ref 50–136)
ALT SERPL-CCNC: 32 U/L (ref 12–65)
ANION GAP SERPL CALC-SCNC: 11 MMOL/L (ref 7–16)
APTT PPP: 28.6 SEC (ref 23.2–35.3)
APTT PPP: 30.6 SEC (ref 23.2–35.3)
APTT PPP: 39.6 SEC (ref 23.2–35.3)
AST SERPL-CCNC: 27 U/L (ref 15–37)
BASOPHILS # BLD: 0.1 K/UL (ref 0–0.2)
BASOPHILS NFR BLD: 1 % (ref 0–2)
BILIRUB SERPL-MCNC: 0.2 MG/DL (ref 0.2–1.1)
BUN SERPL-MCNC: 14 MG/DL (ref 6–23)
CALCIUM SERPL-MCNC: 9.1 MG/DL (ref 8.3–10.4)
CHLORIDE SERPL-SCNC: 103 MMOL/L (ref 98–107)
CO2 SERPL-SCNC: 28 MMOL/L (ref 21–32)
CREAT SERPL-MCNC: 0.67 MG/DL (ref 0.6–1)
DIFFERENTIAL METHOD BLD: NORMAL
EOSINOPHIL # BLD: 0.2 K/UL (ref 0–0.8)
EOSINOPHIL NFR BLD: 2 % (ref 0.5–7.8)
ERYTHROCYTE [DISTWIDTH] IN BLOOD BY AUTOMATED COUNT: 14.1 % (ref 11.9–14.6)
FIBRINOGEN PPP-MCNC: 362 MG/DL (ref 190–501)
FIBRINOGEN PPP-MCNC: 375 MG/DL (ref 190–501)
GLOBULIN SER CALC-MCNC: 3.9 G/DL (ref 2.3–3.5)
GLUCOSE SERPL-MCNC: 99 MG/DL (ref 65–100)
HCT VFR BLD AUTO: 38.3 % (ref 35.8–46.3)
HCT VFR BLD AUTO: 40 % (ref 35.8–46.3)
HCT VFR BLD AUTO: 41.4 % (ref 35.8–46.3)
HGB BLD-MCNC: 12.9 G/DL (ref 11.7–15.4)
HGB BLD-MCNC: 13.2 G/DL (ref 11.7–15.4)
HGB BLD-MCNC: 13.8 G/DL (ref 11.7–15.4)
IMM GRANULOCYTES # BLD: 0 K/UL (ref 0–0.5)
IMM GRANULOCYTES NFR BLD AUTO: 0 % (ref 0–5)
INR PPP: 0.9
INR PPP: 1
INR PPP: 1.1
LYMPHOCYTES # BLD: 3.3 K/UL (ref 0.5–4.6)
LYMPHOCYTES NFR BLD: 33 % (ref 13–44)
MCH RBC QN AUTO: 29.7 PG (ref 26.1–32.9)
MCH RBC QN AUTO: 30.1 PG (ref 26.1–32.9)
MCH RBC QN AUTO: 30.5 PG (ref 26.1–32.9)
MCHC RBC AUTO-ENTMCNC: 33 G/DL (ref 31.4–35)
MCHC RBC AUTO-ENTMCNC: 33.3 G/DL (ref 31.4–35)
MCHC RBC AUTO-ENTMCNC: 33.7 G/DL (ref 31.4–35)
MCV RBC AUTO: 89 FL (ref 79.6–97.8)
MCV RBC AUTO: 90.5 FL (ref 79.6–97.8)
MCV RBC AUTO: 91.1 FL (ref 79.6–97.8)
MONOCYTES # BLD: 0.9 K/UL (ref 0.1–1.3)
MONOCYTES NFR BLD: 9 % (ref 4–12)
NEUTS SEG # BLD: 5.5 K/UL (ref 1.7–8.2)
NEUTS SEG NFR BLD: 55 % (ref 43–78)
PLATELET # BLD AUTO: 196 K/UL (ref 150–450)
PLATELET # BLD AUTO: 220 K/UL (ref 150–450)
PLATELET # BLD AUTO: 265 K/UL (ref 150–450)
PMV BLD AUTO: 10.4 FL (ref 10.8–14.1)
PMV BLD AUTO: 10.8 FL (ref 10.8–14.1)
PMV BLD AUTO: 10.8 FL (ref 10.8–14.1)
POTASSIUM SERPL-SCNC: 3.8 MMOL/L (ref 3.5–5.1)
PROT SERPL-MCNC: 7.8 G/DL (ref 6.3–8.2)
PROTHROMBIN TIME: 11.8 SEC (ref 11.5–14.5)
PROTHROMBIN TIME: 13 SEC (ref 11.5–14.5)
PROTHROMBIN TIME: 13.3 SEC (ref 11.5–14.5)
RBC # BLD AUTO: 4.23 M/UL (ref 4.05–5.25)
RBC # BLD AUTO: 4.39 M/UL (ref 4.05–5.25)
RBC # BLD AUTO: 4.65 M/UL (ref 4.05–5.25)
SODIUM SERPL-SCNC: 142 MMOL/L (ref 136–145)
WBC # BLD AUTO: 10.5 K/UL (ref 4.3–11.1)
WBC # BLD AUTO: 10.9 K/UL (ref 4.3–11.1)
WBC # BLD AUTO: 9.9 K/UL (ref 4.3–11.1)

## 2018-03-01 PROCEDURE — 76010000138 HC OR TIME 0.5 TO 1 HR: Performed by: SURGERY

## 2018-03-01 PROCEDURE — 74011250636 HC RX REV CODE- 250/636

## 2018-03-01 PROCEDURE — 85025 COMPLETE CBC W/AUTO DIFF WBC: CPT

## 2018-03-01 PROCEDURE — 37211 THROMBOLYTIC ART THERAPY: CPT

## 2018-03-01 PROCEDURE — 93925 LOWER EXTREMITY STUDY: CPT

## 2018-03-01 PROCEDURE — 85027 COMPLETE CBC AUTOMATED: CPT | Performed by: NURSE PRACTITIONER

## 2018-03-01 PROCEDURE — 65610000006 HC RM INTENSIVE CARE

## 2018-03-01 PROCEDURE — 85610 PROTHROMBIN TIME: CPT | Performed by: NURSE PRACTITIONER

## 2018-03-01 PROCEDURE — C1769 GUIDE WIRE: HCPCS

## 2018-03-01 PROCEDURE — C1894 INTRO/SHEATH, NON-LASER: HCPCS

## 2018-03-01 PROCEDURE — 99285 EMERGENCY DEPT VISIT HI MDM: CPT

## 2018-03-01 PROCEDURE — 77030020782 HC GWN BAIR PAWS FLX 3M -B: Performed by: ANESTHESIOLOGY

## 2018-03-01 PROCEDURE — 74011250637 HC RX REV CODE- 250/637: Performed by: SURGERY

## 2018-03-01 PROCEDURE — 85610 PROTHROMBIN TIME: CPT

## 2018-03-01 PROCEDURE — B41G1ZZ FLUOROSCOPY OF LEFT LOWER EXTREMITY ARTERIES USING LOW OSMOLAR CONTRAST: ICD-10-PCS | Performed by: SURGERY

## 2018-03-01 PROCEDURE — 94640 AIRWAY INHALATION TREATMENT: CPT

## 2018-03-01 PROCEDURE — 85384 FIBRINOGEN ACTIVITY: CPT | Performed by: NURSE PRACTITIONER

## 2018-03-01 PROCEDURE — 85730 THROMBOPLASTIN TIME PARTIAL: CPT

## 2018-03-01 PROCEDURE — 99284 EMERGENCY DEPT VISIT MOD MDM: CPT | Performed by: EMERGENCY MEDICINE

## 2018-03-01 PROCEDURE — 96375 TX/PRO/DX INJ NEW DRUG ADDON: CPT

## 2018-03-01 PROCEDURE — 37213 THROMBLYTIC ART/VEN THERAPY: CPT

## 2018-03-01 PROCEDURE — 74011250637 HC RX REV CODE- 250/637: Performed by: ANESTHESIOLOGY

## 2018-03-01 PROCEDURE — 77030034850: Performed by: SURGERY

## 2018-03-01 PROCEDURE — 77030020143 HC AIRWY LARYN INTUB CGAS -A: Performed by: ANESTHESIOLOGY

## 2018-03-01 PROCEDURE — 96374 THER/PROPH/DIAG INJ IV PUSH: CPT | Performed by: EMERGENCY MEDICINE

## 2018-03-01 PROCEDURE — 75635 CT ANGIO ABDOMINAL ARTERIES: CPT

## 2018-03-01 PROCEDURE — 74011250636 HC RX REV CODE- 250/636: Performed by: ANESTHESIOLOGY

## 2018-03-01 PROCEDURE — 96374 THER/PROPH/DIAG INJ IV PUSH: CPT

## 2018-03-01 PROCEDURE — 76010000153 HC OR TIME 1.5 TO 2 HR: Performed by: SURGERY

## 2018-03-01 PROCEDURE — C1887 CATHETER, GUIDING: HCPCS

## 2018-03-01 PROCEDURE — 74011000250 HC RX REV CODE- 250

## 2018-03-01 PROCEDURE — 76060000032 HC ANESTHESIA 0.5 TO 1 HR: Performed by: SURGERY

## 2018-03-01 PROCEDURE — 76210000016 HC OR PH I REC 1 TO 1.5 HR: Performed by: SURGERY

## 2018-03-01 PROCEDURE — 96376 TX/PRO/DX INJ SAME DRUG ADON: CPT

## 2018-03-01 PROCEDURE — 80053 COMPREHEN METABOLIC PANEL: CPT

## 2018-03-01 PROCEDURE — 74011250636 HC RX REV CODE- 250/636: Performed by: SURGERY

## 2018-03-01 PROCEDURE — 85730 THROMBOPLASTIN TIME PARTIAL: CPT | Performed by: NURSE PRACTITIONER

## 2018-03-01 PROCEDURE — 77030032490 HC SLV COMPR SCD KNE COVD -B

## 2018-03-01 PROCEDURE — 74011250636 HC RX REV CODE- 250/636: Performed by: EMERGENCY MEDICINE

## 2018-03-01 PROCEDURE — 3E05317 INTRODUCTION OF OTHER THROMBOLYTIC INTO PERIPHERAL ARTERY, PERCUTANEOUS APPROACH: ICD-10-PCS | Performed by: SURGERY

## 2018-03-01 PROCEDURE — 74011636320 HC RX REV CODE- 636/320: Performed by: EMERGENCY MEDICINE

## 2018-03-01 PROCEDURE — 76060000034 HC ANESTHESIA 1.5 TO 2 HR: Performed by: SURGERY

## 2018-03-01 PROCEDURE — 74011000258 HC RX REV CODE- 258: Performed by: EMERGENCY MEDICINE

## 2018-03-01 RX ORDER — HEPARIN SODIUM 5000 [USP'U]/100ML
18-36 INJECTION, SOLUTION INTRAVENOUS
Status: DISCONTINUED | OUTPATIENT
Start: 2018-03-01 | End: 2018-03-01

## 2018-03-01 RX ORDER — HEPARIN SODIUM 5000 [USP'U]/ML
INJECTION, SOLUTION INTRAVENOUS; SUBCUTANEOUS AS NEEDED
Status: DISCONTINUED | OUTPATIENT
Start: 2018-03-01 | End: 2018-03-01 | Stop reason: HOSPADM

## 2018-03-01 RX ORDER — PROPOFOL 10 MG/ML
INJECTION, EMULSION INTRAVENOUS
Status: DISCONTINUED | OUTPATIENT
Start: 2018-03-01 | End: 2018-03-01 | Stop reason: HOSPADM

## 2018-03-01 RX ORDER — ONDANSETRON 2 MG/ML
INJECTION INTRAMUSCULAR; INTRAVENOUS AS NEEDED
Status: DISCONTINUED | OUTPATIENT
Start: 2018-03-01 | End: 2018-03-01 | Stop reason: HOSPADM

## 2018-03-01 RX ORDER — SODIUM CHLORIDE 0.9 % (FLUSH) 0.9 %
5-10 SYRINGE (ML) INJECTION EVERY 8 HOURS
Status: DISCONTINUED | OUTPATIENT
Start: 2018-03-01 | End: 2018-03-01 | Stop reason: HOSPADM

## 2018-03-01 RX ORDER — MORPHINE SULFATE 10 MG/ML
2 INJECTION, SOLUTION INTRAMUSCULAR; INTRAVENOUS
Status: DISCONTINUED | OUTPATIENT
Start: 2018-03-01 | End: 2018-03-01 | Stop reason: SDUPTHER

## 2018-03-01 RX ORDER — ALBUTEROL SULFATE 0.83 MG/ML
2.5 SOLUTION RESPIRATORY (INHALATION) AS NEEDED
Status: DISCONTINUED | OUTPATIENT
Start: 2018-03-01 | End: 2018-03-01 | Stop reason: HOSPADM

## 2018-03-01 RX ORDER — CEFAZOLIN SODIUM/WATER 2 G/20 ML
2 SYRINGE (ML) INTRAVENOUS
Status: COMPLETED | OUTPATIENT
Start: 2018-03-01 | End: 2018-03-01

## 2018-03-01 RX ORDER — LIDOCAINE HYDROCHLORIDE 20 MG/ML
INJECTION, SOLUTION EPIDURAL; INFILTRATION; INTRACAUDAL; PERINEURAL AS NEEDED
Status: DISCONTINUED | OUTPATIENT
Start: 2018-03-01 | End: 2018-03-01 | Stop reason: HOSPADM

## 2018-03-01 RX ORDER — ALBUTEROL SULFATE 90 UG/1
2 AEROSOL, METERED RESPIRATORY (INHALATION)
Status: DISCONTINUED | OUTPATIENT
Start: 2018-03-01 | End: 2018-03-01

## 2018-03-01 RX ORDER — PROPOFOL 10 MG/ML
INJECTION, EMULSION INTRAVENOUS AS NEEDED
Status: DISCONTINUED | OUTPATIENT
Start: 2018-03-01 | End: 2018-03-01 | Stop reason: HOSPADM

## 2018-03-01 RX ORDER — HEPARIN SODIUM 5000 [USP'U]/100ML
18-36 INJECTION, SOLUTION INTRAVENOUS
Status: DISCONTINUED | OUTPATIENT
Start: 2018-03-01 | End: 2018-03-01 | Stop reason: HOSPADM

## 2018-03-01 RX ORDER — CEFAZOLIN SODIUM/WATER 2 G/20 ML
2 SYRINGE (ML) INTRAVENOUS EVERY 8 HOURS
Status: COMPLETED | OUTPATIENT
Start: 2018-03-01 | End: 2018-03-02

## 2018-03-01 RX ORDER — SODIUM CHLORIDE 9 MG/ML
100 INJECTION, SOLUTION INTRAVENOUS CONTINUOUS
Status: DISCONTINUED | OUTPATIENT
Start: 2018-03-01 | End: 2018-03-05

## 2018-03-01 RX ORDER — HEPARIN SODIUM 1000 [USP'U]/ML
INJECTION, SOLUTION INTRAVENOUS; SUBCUTANEOUS AS NEEDED
Status: DISCONTINUED | OUTPATIENT
Start: 2018-03-01 | End: 2018-03-01 | Stop reason: HOSPADM

## 2018-03-01 RX ORDER — MIDAZOLAM HYDROCHLORIDE 1 MG/ML
2 INJECTION, SOLUTION INTRAMUSCULAR; INTRAVENOUS
Status: COMPLETED | OUTPATIENT
Start: 2018-03-01 | End: 2018-03-01

## 2018-03-01 RX ORDER — ONDANSETRON 2 MG/ML
4 INJECTION INTRAMUSCULAR; INTRAVENOUS
Status: COMPLETED | OUTPATIENT
Start: 2018-03-01 | End: 2018-03-01

## 2018-03-01 RX ORDER — HEPARIN SODIUM 5000 [USP'U]/ML
5000 INJECTION, SOLUTION INTRAVENOUS; SUBCUTANEOUS
Status: COMPLETED | OUTPATIENT
Start: 2018-03-01 | End: 2018-03-01

## 2018-03-01 RX ORDER — DEXAMETHASONE SODIUM PHOSPHATE 4 MG/ML
INJECTION, SOLUTION INTRA-ARTICULAR; INTRALESIONAL; INTRAMUSCULAR; INTRAVENOUS; SOFT TISSUE AS NEEDED
Status: DISCONTINUED | OUTPATIENT
Start: 2018-03-01 | End: 2018-03-01 | Stop reason: HOSPADM

## 2018-03-01 RX ORDER — HEPARIN SODIUM 5000 [USP'U]/100ML
500 INJECTION, SOLUTION INTRAVENOUS CONTINUOUS
Status: DISCONTINUED | OUTPATIENT
Start: 2018-03-01 | End: 2018-03-02

## 2018-03-01 RX ORDER — SODIUM CHLORIDE 0.9 % (FLUSH) 0.9 %
5-10 SYRINGE (ML) INJECTION EVERY 8 HOURS
Status: DISCONTINUED | OUTPATIENT
Start: 2018-03-01 | End: 2018-03-05 | Stop reason: HOSPADM

## 2018-03-01 RX ORDER — FENTANYL CITRATE 50 UG/ML
INJECTION, SOLUTION INTRAMUSCULAR; INTRAVENOUS AS NEEDED
Status: DISCONTINUED | OUTPATIENT
Start: 2018-03-01 | End: 2018-03-01 | Stop reason: HOSPADM

## 2018-03-01 RX ORDER — MIDAZOLAM HYDROCHLORIDE 1 MG/ML
INJECTION, SOLUTION INTRAMUSCULAR; INTRAVENOUS AS NEEDED
Status: DISCONTINUED | OUTPATIENT
Start: 2018-03-01 | End: 2018-03-01 | Stop reason: HOSPADM

## 2018-03-01 RX ORDER — SODIUM CHLORIDE 0.9 % (FLUSH) 0.9 %
5-10 SYRINGE (ML) INJECTION AS NEEDED
Status: DISCONTINUED | OUTPATIENT
Start: 2018-03-01 | End: 2018-03-01 | Stop reason: HOSPADM

## 2018-03-01 RX ORDER — SODIUM CHLORIDE 0.9 % (FLUSH) 0.9 %
10 SYRINGE (ML) INJECTION
Status: COMPLETED | OUTPATIENT
Start: 2018-03-01 | End: 2018-03-01

## 2018-03-01 RX ORDER — SODIUM CHLORIDE, SODIUM LACTATE, POTASSIUM CHLORIDE, CALCIUM CHLORIDE 600; 310; 30; 20 MG/100ML; MG/100ML; MG/100ML; MG/100ML
1000 INJECTION, SOLUTION INTRAVENOUS CONTINUOUS
Status: DISCONTINUED | OUTPATIENT
Start: 2018-03-01 | End: 2018-03-01

## 2018-03-01 RX ORDER — HYDROMORPHONE HYDROCHLORIDE 2 MG/ML
1 INJECTION, SOLUTION INTRAMUSCULAR; INTRAVENOUS; SUBCUTANEOUS
Status: COMPLETED | OUTPATIENT
Start: 2018-03-01 | End: 2018-03-01

## 2018-03-01 RX ORDER — MORPHINE SULFATE 2 MG/ML
2 INJECTION, SOLUTION INTRAMUSCULAR; INTRAVENOUS
Status: DISCONTINUED | OUTPATIENT
Start: 2018-03-01 | End: 2018-03-05 | Stop reason: HOSPADM

## 2018-03-01 RX ORDER — CARVEDILOL 12.5 MG/1
12.5 TABLET ORAL ONCE
Status: COMPLETED | OUTPATIENT
Start: 2018-03-01 | End: 2018-03-01

## 2018-03-01 RX ORDER — HEPARIN SODIUM 5000 [USP'U]/100ML
500 INJECTION, SOLUTION INTRAVENOUS CONTINUOUS
Status: DISCONTINUED | OUTPATIENT
Start: 2018-03-01 | End: 2018-03-01

## 2018-03-01 RX ORDER — MORPHINE SULFATE 4 MG/ML
2 INJECTION, SOLUTION INTRAMUSCULAR; INTRAVENOUS
Status: DISCONTINUED | OUTPATIENT
Start: 2018-03-01 | End: 2018-03-01 | Stop reason: SDUPTHER

## 2018-03-01 RX ORDER — ONDANSETRON 2 MG/ML
4 INJECTION INTRAMUSCULAR; INTRAVENOUS
Status: DISCONTINUED | OUTPATIENT
Start: 2018-03-01 | End: 2018-03-05 | Stop reason: HOSPADM

## 2018-03-01 RX ORDER — SODIUM CHLORIDE 9 MG/ML
100 INJECTION, SOLUTION INTRAVENOUS CONTINUOUS
Status: DISCONTINUED | OUTPATIENT
Start: 2018-03-01 | End: 2018-03-02

## 2018-03-01 RX ORDER — MIDAZOLAM HYDROCHLORIDE 1 MG/ML
INJECTION, SOLUTION INTRAMUSCULAR; INTRAVENOUS AS NEEDED
Status: DISCONTINUED | OUTPATIENT
Start: 2018-03-01 | End: 2018-03-01

## 2018-03-01 RX ORDER — ACETAMINOPHEN 500 MG
1000 TABLET ORAL ONCE
Status: COMPLETED | OUTPATIENT
Start: 2018-03-01 | End: 2018-03-01

## 2018-03-01 RX ORDER — EPHEDRINE SULFATE 50 MG/ML
INJECTION, SOLUTION INTRAVENOUS AS NEEDED
Status: DISCONTINUED | OUTPATIENT
Start: 2018-03-01 | End: 2018-03-01 | Stop reason: HOSPADM

## 2018-03-01 RX ORDER — HYDROMORPHONE HYDROCHLORIDE 2 MG/ML
0.5 INJECTION, SOLUTION INTRAMUSCULAR; INTRAVENOUS; SUBCUTANEOUS
Status: DISCONTINUED | OUTPATIENT
Start: 2018-03-01 | End: 2018-03-01 | Stop reason: HOSPADM

## 2018-03-01 RX ORDER — SODIUM CHLORIDE 0.9 % (FLUSH) 0.9 %
5-10 SYRINGE (ML) INJECTION AS NEEDED
Status: DISCONTINUED | OUTPATIENT
Start: 2018-03-01 | End: 2018-03-05 | Stop reason: HOSPADM

## 2018-03-01 RX ORDER — LIDOCAINE HYDROCHLORIDE 10 MG/ML
0.1 INJECTION INFILTRATION; PERINEURAL AS NEEDED
Status: DISCONTINUED | OUTPATIENT
Start: 2018-03-01 | End: 2018-03-01 | Stop reason: HOSPADM

## 2018-03-01 RX ORDER — ALBUTEROL SULFATE 90 UG/1
2 AEROSOL, METERED RESPIRATORY (INHALATION)
Status: DISCONTINUED | OUTPATIENT
Start: 2018-03-02 | End: 2018-03-05 | Stop reason: HOSPADM

## 2018-03-01 RX ORDER — CEFAZOLIN SODIUM 1 G/3ML
INJECTION, POWDER, FOR SOLUTION INTRAMUSCULAR; INTRAVENOUS AS NEEDED
Status: DISCONTINUED | OUTPATIENT
Start: 2018-03-01 | End: 2018-03-01 | Stop reason: HOSPADM

## 2018-03-01 RX ORDER — HYDROMORPHONE HYDROCHLORIDE 2 MG/ML
1 INJECTION, SOLUTION INTRAMUSCULAR; INTRAVENOUS; SUBCUTANEOUS ONCE
Status: COMPLETED | OUTPATIENT
Start: 2018-03-01 | End: 2018-03-01

## 2018-03-01 RX ORDER — GUAIFENESIN 100 MG/5ML
81 LIQUID (ML) ORAL DAILY
Status: DISCONTINUED | OUTPATIENT
Start: 2018-03-01 | End: 2018-03-05 | Stop reason: HOSPADM

## 2018-03-01 RX ORDER — ONDANSETRON 2 MG/ML
4 INJECTION INTRAMUSCULAR; INTRAVENOUS
Status: DISCONTINUED | OUTPATIENT
Start: 2018-03-01 | End: 2018-03-01 | Stop reason: HOSPADM

## 2018-03-01 RX ORDER — HEPARIN SODIUM 200 [USP'U]/100ML
500 INJECTION, SOLUTION INTRAVENOUS CONTINUOUS
Status: DISCONTINUED | OUTPATIENT
Start: 2018-03-01 | End: 2018-03-01

## 2018-03-01 RX ADMIN — ONDANSETRON 4 MG: 2 INJECTION INTRAMUSCULAR; INTRAVENOUS at 08:23

## 2018-03-01 RX ADMIN — SODIUM CHLORIDE 100 ML: 900 INJECTION, SOLUTION INTRAVENOUS at 06:28

## 2018-03-01 RX ADMIN — ALTEPLASE 1 MG/HR: KIT at 09:45

## 2018-03-01 RX ADMIN — PROPOFOL 140 MCG/KG/MIN: 10 INJECTION, EMULSION INTRAVENOUS at 13:13

## 2018-03-01 RX ADMIN — HEPARIN SODIUM 5000 UNITS: 5000 INJECTION, SOLUTION INTRAVENOUS; SUBCUTANEOUS at 04:44

## 2018-03-01 RX ADMIN — MORPHINE SULFATE 2 MG: 4 INJECTION, SOLUTION INTRAMUSCULAR; INTRAVENOUS at 15:39

## 2018-03-01 RX ADMIN — MORPHINE SULFATE 2 MG: 2 INJECTION, SOLUTION INTRAMUSCULAR; INTRAVENOUS at 22:16

## 2018-03-01 RX ADMIN — HYDROMORPHONE HYDROCHLORIDE 1 MG: 2 INJECTION, SOLUTION INTRAMUSCULAR; INTRAVENOUS; SUBCUTANEOUS at 04:56

## 2018-03-01 RX ADMIN — PROPOFOL 40 MG: 10 INJECTION, EMULSION INTRAVENOUS at 13:13

## 2018-03-01 RX ADMIN — ONDANSETRON 4 MG: 2 INJECTION INTRAMUSCULAR; INTRAVENOUS at 13:30

## 2018-03-01 RX ADMIN — MORPHINE SULFATE 2 MG: 10 INJECTION INTRAMUSCULAR; INTRAVENOUS; SUBCUTANEOUS at 11:38

## 2018-03-01 RX ADMIN — HYDROMORPHONE HYDROCHLORIDE 0.5 MG: 2 INJECTION, SOLUTION INTRAMUSCULAR; INTRAVENOUS; SUBCUTANEOUS at 09:30

## 2018-03-01 RX ADMIN — ONDANSETRON 4 MG: 2 INJECTION INTRAMUSCULAR; INTRAVENOUS at 03:07

## 2018-03-01 RX ADMIN — SODIUM CHLORIDE 100 ML/HR: 900 INJECTION, SOLUTION INTRAVENOUS at 14:39

## 2018-03-01 RX ADMIN — PROPOFOL 150 MG: 10 INJECTION, EMULSION INTRAVENOUS at 13:22

## 2018-03-01 RX ADMIN — ACETAMINOPHEN 1000 MG: 500 TABLET, FILM COATED ORAL at 07:23

## 2018-03-01 RX ADMIN — HYDROMORPHONE HYDROCHLORIDE 0.5 MG: 2 INJECTION, SOLUTION INTRAMUSCULAR; INTRAVENOUS; SUBCUTANEOUS at 09:50

## 2018-03-01 RX ADMIN — Medication 10 ML: at 21:20

## 2018-03-01 RX ADMIN — LIDOCAINE HYDROCHLORIDE 60 MG: 20 INJECTION, SOLUTION EPIDURAL; INFILTRATION; INTRACAUDAL; PERINEURAL at 13:13

## 2018-03-01 RX ADMIN — ALTEPLASE 1 MG/HR: KIT at 09:19

## 2018-03-01 RX ADMIN — LIDOCAINE HYDROCHLORIDE 40 MG: 20 INJECTION, SOLUTION EPIDURAL; INFILTRATION; INTRACAUDAL; PERINEURAL at 07:40

## 2018-03-01 RX ADMIN — ASPIRIN 81 MG 81 MG: 81 TABLET ORAL at 07:23

## 2018-03-01 RX ADMIN — PROPOFOL 140 MCG/KG/MIN: 10 INJECTION, EMULSION INTRAVENOUS at 07:40

## 2018-03-01 RX ADMIN — EPHEDRINE SULFATE 5 MG: 50 INJECTION, SOLUTION INTRAVENOUS at 08:27

## 2018-03-01 RX ADMIN — SODIUM CHLORIDE 100 ML/HR: 900 INJECTION, SOLUTION INTRAVENOUS at 12:02

## 2018-03-01 RX ADMIN — ALBUTEROL SULFATE 2 PUFF: 90 AEROSOL, METERED RESPIRATORY (INHALATION) at 19:35

## 2018-03-01 RX ADMIN — CARVEDILOL 12.5 MG: 12.5 TABLET, FILM COATED ORAL at 07:23

## 2018-03-01 RX ADMIN — ALTEPLASE 1 MG/HR: KIT at 21:16

## 2018-03-01 RX ADMIN — HEPARIN SODIUM 6000 UNITS: 1000 INJECTION, SOLUTION INTRAVENOUS; SUBCUTANEOUS at 08:11

## 2018-03-01 RX ADMIN — HYDROMORPHONE HYDROCHLORIDE 0.5 MG: 2 INJECTION, SOLUTION INTRAMUSCULAR; INTRAVENOUS; SUBCUTANEOUS at 09:18

## 2018-03-01 RX ADMIN — SODIUM CHLORIDE, SODIUM LACTATE, POTASSIUM CHLORIDE, AND CALCIUM CHLORIDE 1000 ML: 600; 310; 30; 20 INJECTION, SOLUTION INTRAVENOUS at 07:24

## 2018-03-01 RX ADMIN — EPHEDRINE SULFATE 5 MG: 50 INJECTION, SOLUTION INTRAVENOUS at 08:22

## 2018-03-01 RX ADMIN — CEFAZOLIN SODIUM 2 G: 1 INJECTION, POWDER, FOR SOLUTION INTRAMUSCULAR; INTRAVENOUS at 13:25

## 2018-03-01 RX ADMIN — HEPARIN SODIUM 18 UNITS/KG/HR: 5000 INJECTION, SOLUTION INTRAVENOUS at 04:56

## 2018-03-01 RX ADMIN — Medication 10 ML: at 06:28

## 2018-03-01 RX ADMIN — IOPAMIDOL 125 ML: 755 INJECTION, SOLUTION INTRAVENOUS at 06:28

## 2018-03-01 RX ADMIN — EPHEDRINE SULFATE 10 MG: 50 INJECTION, SOLUTION INTRAVENOUS at 08:15

## 2018-03-01 RX ADMIN — DEXAMETHASONE SODIUM PHOSPHATE 4 MG: 4 INJECTION, SOLUTION INTRA-ARTICULAR; INTRALESIONAL; INTRAMUSCULAR; INTRAVENOUS; SOFT TISSUE at 13:30

## 2018-03-01 RX ADMIN — MORPHINE SULFATE 2 MG: 4 INJECTION, SOLUTION INTRAMUSCULAR; INTRAVENOUS at 17:47

## 2018-03-01 RX ADMIN — PROPOFOL 40 MG: 10 INJECTION, EMULSION INTRAVENOUS at 07:40

## 2018-03-01 RX ADMIN — EPHEDRINE SULFATE 10 MG: 50 INJECTION, SOLUTION INTRAVENOUS at 08:03

## 2018-03-01 RX ADMIN — PROPOFOL 120 MG: 10 INJECTION, EMULSION INTRAVENOUS at 07:44

## 2018-03-01 RX ADMIN — HEPARIN SODIUM AND DEXTROSE 500 UNITS/HR: 5000; 5 INJECTION INTRAVENOUS at 14:06

## 2018-03-01 RX ADMIN — EPHEDRINE SULFATE 5 MG: 50 INJECTION, SOLUTION INTRAVENOUS at 08:11

## 2018-03-01 RX ADMIN — Medication 2 G: at 21:20

## 2018-03-01 RX ADMIN — MORPHINE SULFATE 2 MG: 2 INJECTION, SOLUTION INTRAMUSCULAR; INTRAVENOUS at 20:18

## 2018-03-01 RX ADMIN — EPHEDRINE SULFATE 5 MG: 50 INJECTION, SOLUTION INTRAVENOUS at 08:33

## 2018-03-01 RX ADMIN — HEPARIN SODIUM AND DEXTROSE 18 UNITS/KG/HR: 5000; 5 INJECTION INTRAVENOUS at 07:29

## 2018-03-01 RX ADMIN — FENTANYL CITRATE 25 MCG: 50 INJECTION, SOLUTION INTRAMUSCULAR; INTRAVENOUS at 13:11

## 2018-03-01 RX ADMIN — HYDROMORPHONE HYDROCHLORIDE 1 MG: 2 INJECTION, SOLUTION INTRAMUSCULAR; INTRAVENOUS; SUBCUTANEOUS at 03:07

## 2018-03-01 RX ADMIN — Medication 2 G: at 07:49

## 2018-03-01 RX ADMIN — HYDROMORPHONE HYDROCHLORIDE 0.5 MG: 2 INJECTION, SOLUTION INTRAMUSCULAR; INTRAVENOUS; SUBCUTANEOUS at 09:42

## 2018-03-01 RX ADMIN — DEXAMETHASONE SODIUM PHOSPHATE 4 MG: 4 INJECTION, SOLUTION INTRA-ARTICULAR; INTRALESIONAL; INTRAMUSCULAR; INTRAVENOUS; SOFT TISSUE at 08:23

## 2018-03-01 RX ADMIN — ALTEPLASE 1 MG/HR: KIT at 14:03

## 2018-03-01 RX ADMIN — HEPARIN SODIUM AND DEXTROSE 500 UNITS/HR: 5000; 5 INJECTION INTRAVENOUS at 09:21

## 2018-03-01 RX ADMIN — MIDAZOLAM HYDROCHLORIDE 2 MG: 1 INJECTION, SOLUTION INTRAMUSCULAR; INTRAVENOUS at 07:36

## 2018-03-01 RX ADMIN — MIDAZOLAM HYDROCHLORIDE 2 MG: 1 INJECTION, SOLUTION INTRAMUSCULAR; INTRAVENOUS at 07:25

## 2018-03-01 RX ADMIN — Medication 10 ML: at 14:11

## 2018-03-01 NOTE — ED NOTES
Patient transferred to [address] [place] [address] ER, receiving RN given opportunity to review chart and call with questions.

## 2018-03-01 NOTE — ANESTHESIA PREPROCEDURE EVALUATION
Anesthetic History               Review of Systems / Medical History  Patient summary reviewed, nursing notes reviewed and pertinent labs reviewed    Pulmonary    COPD: mild      Undiagnosed apnea and smoker         Neuro/Psych         Psychiatric history     Cardiovascular    Hypertension: well controlled          Past MI (2009), CAD, PAD, cardiac stents (4/15), CABG (2009) and hyperlipidemia    Exercise tolerance: <4 METS  Comments: Stent in 4/2015    Patient is a poor historian, she reports a vague history of chestpain and or shortness of breath that seem to be longstanding, she cannot describe her symptoms, she says she sometimes gets short of breath upon walking across the room, by the end of the interview I felt the patient was very suggestable. She denies current chest pain, shortness of breath or changes in her activity in the past 3 months, however she reports leg pain limiting her activity since December.    GI/Hepatic/Renal     GERD: well controlled           Endo/Other      Hypothyroidism: well controlled  Obesity and arthritis     Other Findings            Physical Exam    Airway  Mallampati: II  TM Distance: 4 - 6 cm  Neck ROM: normal range of motion   Mouth opening: Normal     Cardiovascular    Rhythm: regular  Rate: normal    Murmur: Grade 2, Aortic area  Pertinent negatives: No peripheral edema   Dental    Dentition: Full upper dentures, Full lower dentures and Edentulous     Pulmonary  Breath sounds clear to auscultation               Abdominal  GI exam deferred       Other Findings            Anesthetic Plan    ASA: 3  Anesthesia type: total IV anesthesia          Induction: Intravenous  Anesthetic plan and risks discussed with: Patient      givencomorbidities I would prefer to do this procedure under light mac, however given the patient discomfort, comorbidities and affect, I think it is likely we will have to convert to an LMA general as  This would be safer than a deep mac without an airway device in this patient prone to obstruction.

## 2018-03-01 NOTE — IP AVS SNAPSHOT
[address] 
[phone] Patient: Luis Manuel Poole MRN: [id number] :1965 A check sandi indicates which time of day the medication should be taken. My Medications START taking these medications Instructions Each Dose to Equal  
 Morning Noon Evening Bedtime  
 enoxaparin 120 mg/0.8 mL injection Commonly known as:  LOVENOX Your next dose is: Today 120 mg by SubCUTAneous route every twelve (12) hours every twelve (12) hours for 5 days. 120 mg  
    
   
   
Start today  
   
  
 senna-docusate 8.6-50 mg per tablet Commonly known as:  Celine Belch Start taking on:  3/6/2018 Your last dose was: This am  
Notes to Patient:  Resume normal schedule Take 2 Tabs by mouth daily. Indications: constipation 2 Tab  
    
   
   
   
  
 traMADol 50 mg tablet Commonly known as:  ULTRAM  
   
 Take 1 Tab by mouth every six (6) hours as needed for Pain. Max Daily Amount: 200 mg.  
 50 mg  
    
   
   
   
  
 warfarin 5 mg tablet Commonly known as:  COUMADIN Your next dose is: Today Take 1 Tab by mouth every evening. Indications: Distal thromboembolus (PVD) 5 mg Start this evening CHANGE how you take these medications Instructions Each Dose to Equal  
 Morning Noon Evening Bedtime  
 clobetasol 0.05 % topical cream  
Commonly known as:  Manny Kolb What changed:   
- when to take this 
- reasons to take this Notes to Patient:  Resume normal schedule Apply  to affected area two (2) times a day. CONTINUE taking these medications Instructions Each Dose to Equal  
 Morning Noon Evening Bedtime  
 acetaminophen-codeine 300-30 mg per tablet Commonly known as:  TYLENOL-CODEINE #3 Take 1 Tab by mouth every four (4) hours as needed for Pain. Max Daily Amount: 6 Tabs. 1 Tab albuterol 90 mcg/actuation inhaler Commonly known as:  PROVENTIL HFA, VENTOLIN HFA, PROAIR HFA Take 2 Puffs by inhalation every six (6) hours as needed for Wheezing or Shortness of Breath. 2 Puff  
    
   
   
   
  
 amLODIPine 10 mg tablet Commonly known as:  Diana Damon Your next dose is:  Tomorrow TAKE 1 TABLET (10 MG) BY MOUTH DAILY. Start tomorrow Armodafinil 250 mg Tab tablet Commonly known as:  Ena Recinos Notes to Patient:  Resume normal schedule Take 1 Tab by mouth Daily (before breakfast). Max Daily Amount: 250 mg.  
 250 mg  
    
   
   
   
  
 aspirin delayed-release 81 mg tablet Your next dose is:  Tomorrow Take 81 mg by mouth daily. 81 mg  
    
Start tomorrow  
   
   
   
  
 atorvastatin 80 mg tablet Commonly known as:  LIPITOR Your next dose is: Today Take 80 mg by mouth nightly. 80 mg  
    
   
   
Start today  
   
  
 budesonide-formoterol 160-4.5 mcg/actuation Hfaa Commonly known as:  SYMBICORT Your next dose is: Today Take 2 Puffs by inhalation two (2) times a day. 2 Puff Start this evening  
   
  
 carvedilol 12.5 mg tablet Commonly known as:  Kip Evener Your next dose is: Today Take 12.5 mg by mouth two (2) times a day. 12.5 mg  
    
   
   
Start this evening  
   
  
 clonazePAM 1 mg tablet Commonly known as:  Niesha Rebel Take 1 Tab by mouth daily as needed. 1 mg  
    
   
   
   
  
 furosemide 40 mg tablet Commonly known as:  LASIX Your next dose is:  Tomorrow TAKE 1 TABLET BY MOUTH EVERY DAY Start tomorrow  
   
   
   
  
 isosorbide mononitrate ER 60 mg CR tablet Commonly known as:  IMDUR Your next dose is: Today Take 60 mg by mouth two (2) times a day. 60 mg  
    
   
   
Start today  
   
  
 levothyroxine 25 mcg tablet Commonly known as:  SYNTHROID Your next dose is:  Tomorrow Take 1 Tab by mouth Daily (before breakfast). 25 mcg Start in the am  
   
   
   
  
 meclizine 25 mg tablet Commonly known as:  ANTIVERT Notes to Patient:  Resume normal schedule One tab three times a day as needed for vertigo  
     
   
   
   
  
 multivitamin tablet Commonly known as:  ONE A DAY Notes to Patient:  Resume normal schedule Take 1 Tab by mouth daily. Stopped 1/12/18  Indications: did not hold 1 Tab  
    
   
   
   
  
 nitroglycerin 0.4 mg SL tablet Commonly known as:  NITROSTAT  
   
 1 Tab by SubLINGual route every five (5) minutes as needed. 0.4 mg  
    
   
   
   
  
 olmesartan 40 mg tablet Commonly known as:  [employer] Take 1 Tab by mouth every evening. 40 mg  
    
   
   
   
  
 omeprazole 20 mg capsule Commonly known as:  PRILOSEC Notes to Patient:  Resume normal schedule TAKE 1 CAPSULE BY MOUTH DAILY. INDICATIONS: GASTROESOPHAGEAL REFLUX, HEARTBURN  
     
   
   
   
  
 ranolazine  mg SR tablet Commonly known as:  RANEXA Your next dose is:  Tomorrow Take 500 mg by mouth daily. 500 mg Start tomorrow STOP taking these medications EFFIENT 10 mg tablet Generic drug:  prasugrel  
   
  
 rivaroxaban 20 mg Tab tablet Commonly known as:  Gatito Dang Where to Get Your Medications Information on where to get these meds will be given to you by the nurse or doctor. ! Ask your nurse or doctor about these medications  
  enoxaparin 120 mg/0.8 mL injection  
 senna-docusate 8.6-50 mg per tablet  
 traMADol 50 mg tablet  
 warfarin 5 mg tablet

## 2018-03-01 NOTE — ED PROVIDER NOTES
HPI Comments: Patient is a transfer from [facility] [address] for a possible left leg arterial clot. Briefly, she started having pain around noon. It has been constant and getting progressively worse. She is on 0 and has been taking it. She presented to [facility]OS[facility] [address]  Where she had no palpable pulses and was sent here for evaluation for a possible arterial clot. Elements of this note were created using speech recognition software. As such, errors of speech recognition may be present. Patient is a 50 y.o. female presenting with foot pain. The history is provided by the patient.    Foot Pain           Past Medical History:   Diagnosis Date    Arthritis     CAD (coronary artery disease) CABG     cabg -- stent x 1--2015-- followed by dr. Ken Mckee Chronic back pain states cannot lie flat due to back pain    Chronic kidney disease     stents x 2--    Chronic obstructive pulmonary disease (HCC)     daily inhaler---- no home O2    Chronic pain     Coagulation defect ([address]     DDD (degenerative disc disease), lumbar     Depression     Fibromyalgia     GERD (gastroesophageal reflux disease)     controlled with med    Hypercholesterolemia     Hypertension     controlled with med    Joint ache     Left leg pain     Liver disease     enlarged per pt    Memory impairment     MI, old x 2    2009    Narcolepsy     Obesity (BMI 30-39.9)     36.6    Persistent insomnia      PVD (peripheral vascular disease) ([address]     S/P CABG x 4     Stented coronary artery     EF 55-60% echo 12/15    Thyroid disease     hypo    Vertigo        Past Surgical History:   Procedure Laterality Date    HX  SECTION      x3    HX CHOLECYSTECTOMY      HX CORONARY ARTERY BYPASS GRAFT  2009    x 4 grafts    HX CORONARY STENT PLACEMENT  2015    x1 spring    HX HYSTERECTOMY      HX ORTHOPAEDIC      left knee arthroscopy     HX OTHER SURGICAL      renal stents     HX SALPINGO-OOPHORECTOMY      VASCULAR SURGERY PROCEDURE UNLIST Left 01/15/2018    arteriogram fem and pop angio w/stenting    VASCULAR SURGERY PROCEDURE UNLIST Left 01/16/2018    arteriogram, fem pop thromectomy    VASCULAR SURGERY PROCEDURE UNLIST Left 01/17/2018    f/u lysis         Family History:   Problem Relation Age of Onset    Diabetes Mother     Heart Disease Mother     Kidney Disease Mother     Lung Disease Mother     Diabetes Father     Heart Disease Father     Kidney Disease Father     Lung Disease Father        Social History     Social History    Marital status: SINGLE     Spouse name: N/A    Number of children: N/A    Years of education: N/A     Occupational History    Not on file. Social History Main Topics    Smoking status: Former Smoker     Packs/day: 1.00     Years: 36.00     Types: Cigarettes     Quit date: 10/9/2016    Smokeless tobacco: Never Used    Alcohol use No      Comment:      Drug use: No    Sexual activity: Not on file     Other Topics Concern    Not on file     Social History Narrative         ALLERGIES: Review of patient's allergies indicates no known allergies. Review of Systems   Constitutional: Negative for chills and fever. Gastrointestinal: Negative for nausea and vomiting. All other systems reviewed and are negative. Vitals:    03/01/18 0559   BP: 143/89   Pulse: 72   Resp: 16   Temp: 98 °F (36.7 °C)   SpO2: 96%   Weight: 97.5 kg (215 lb)   Height: 5' 7\" (1.702 m)            Physical Exam   Constitutional: She is oriented to person, place, and time. She appears well-developed and well-nourished. HENT:   Head: Normocephalic and atraumatic. Eyes: Conjunctivae are normal. Pupils are equal, round, and reactive to light. Neck: Normal range of motion. Neck supple. Cardiovascular:   Palpable pulses in her right foot, no palpable pulses in her left foot   Musculoskeletal: She exhibits no edema or tenderness. Neurological: She is alert and oriented to person, place, and time. Skin: Skin is warm and dry. Psychiatric: She has a normal mood and affect. Her behavior is normal.   Nursing note and vitals reviewed.        MDM  Number of Diagnoses or Management Options  Arterial occlusion [facility]): new and requires workup  Diagnosis management comments: Differential diagnosis: DVT, arterial clot, vascular occlusion  6:15 AM CTA with runoff ordered upon patient presentation  6:49 AM Pt taken to OR from CT       Amount and/or Complexity of Data Reviewed  Clinical lab tests: ordered and reviewed  Tests in the radiology section of CPT®: ordered    Risk of Complications, Morbidity, and/or Mortality  Presenting problems: high  Diagnostic procedures: moderate  Management options: high          ED Course       Procedures

## 2018-03-01 NOTE — IP AVS SNAPSHOT
[facility] 
 
 
 [address] 
[phone] Patient: Swetha Bonilla MRN: [id number] :[id number] About your hospitalization You were admitted on:  2018 You last received care in the:  [facility] 2 CV STEPDOWN You were discharged on:  2018 Why you were hospitalized Your primary diagnosis was:  Ischemia Of Left Lower Extremity Your diagnoses also included:  Ischemic Leg, Tobacco Use Disorder Follow-up Information Follow up With Details Comments Contact Info Arlene Boone MD Go on 3/13/2018 Follow up appointment 8:45am [address] [facility] [address] 
[phone] Hadley Lu MD Go on 3/21/2018 For wound re-check Wednesday 9:15 [address] [place] [address] [address] [facility] [address] [phone] Coumadin Clinic associated with Dr. Nunn  office Call Tomorrow and follow up if consult was recieved from Dr. Nunn  office to follow up with clinic need appointment for this week. [phone] Discharge RN spoke with Alysia Marquez the [address] [place] [address] this office plan to obtain consult. [facility] Primary [facility] Schedule an appointment as soon as possible for a visit For follow up after surgery. Your Scheduled Appointments 2018  9:20 AM EDT  
(Arrive by 9:00 AM) Return appointment with CARLINE Gates [facility] ([facility]) [place] [address] [address] [place] [address] [place] [address]  
[phone] 2018  9:15 AM EDT  
ESTABLISHED PATIENT with Hadley Lu MD  
[facility] ([facility]) [address] [address] [phone] Discharge Orders None A check sandi indicates which time of day the medication should be taken. My Medications START taking these medications Instructions Each Dose to Equal  
 Morning Noon Evening Bedtime  
 enoxaparin 120 mg/0.8 mL injection Commonly known as:  LOVENOX Your next dose is: Today 120 mg by SubCUTAneous route every twelve (12) hours every twelve (12) hours for 5 days. 120 mg  
    
   
   
Start today  
   
  
 senna-docusate 8.6-50 mg per tablet Commonly known as:  Celine Belch Start taking on:  3/6/2018 Your last dose was: This am  
Notes to Patient:  Resume normal schedule Take 2 Tabs by mouth daily. Indications: constipation 2 Tab  
    
   
   
   
  
 traMADol 50 mg tablet Commonly known as:  ULTRAM  
   
 Take 1 Tab by mouth every six (6) hours as needed for Pain. Max Daily Amount: 200 mg.  
 50 mg  
    
   
   
   
  
 warfarin 5 mg tablet Commonly known as:  COUMADIN Your next dose is: Today Take 1 Tab by mouth every evening. Indications: Distal thromboembolus (PVD) 5 mg Start this evening CHANGE how you take these medications Instructions Each Dose to Equal  
 Morning Noon Evening Bedtime  
 clobetasol 0.05 % topical cream  
Commonly known as:  Manny Kolb What changed:   
- when to take this 
- reasons to take this Notes to Patient:  Resume normal schedule Apply  to affected area two (2) times a day. CONTINUE taking these medications Instructions Each Dose to Equal  
 Morning Noon Evening Bedtime  
 acetaminophen-codeine 300-30 mg per tablet Commonly known as:  TYLENOL-CODEINE #3 Take 1 Tab by mouth every four (4) hours as needed for Pain. Max Daily Amount: 6 Tabs. 1 Tab  
    
   
   
   
  
 albuterol 90 mcg/actuation inhaler Commonly known as:  PROVENTIL HFA, VENTOLIN HFA, PROAIR HFA Take 2 Puffs by inhalation every six (6) hours as needed for Wheezing or Shortness of Breath. 2 Puff  
    
   
   
   
  
 amLODIPine 10 mg tablet Commonly known as:  Viviana Chung Your next dose is:  Tomorrow TAKE 1 TABLET (10 MG) BY MOUTH DAILY. Start tomorrow Armodafinil 250 mg Tab tablet Commonly known as:  Sebastian Chávez Notes to Patient:  Resume normal schedule Take 1 Tab by mouth Daily (before breakfast). Max Daily Amount: 250 mg.  
 250 mg  
    
   
   
   
  
 aspirin delayed-release 81 mg tablet Your next dose is:  Tomorrow Take 81 mg by mouth daily. 81 mg  
    
Start tomorrow  
   
   
   
  
 atorvastatin 80 mg tablet Commonly known as:  LIPITOR Your next dose is: Today Take 80 mg by mouth nightly. 80 mg  
    
   
   
Start today  
   
  
 budesonide-formoterol 160-4.5 mcg/actuation Hfaa Commonly known as:  SYMBICORT Your next dose is: Today Take 2 Puffs by inhalation two (2) times a day. 2 Puff Start this evening  
   
  
 carvedilol 12.5 mg tablet Commonly known as:  Drena Gavel Your next dose is: Today Take 12.5 mg by mouth two (2) times a day. 12.5 mg  
    
   
   
Start this evening  
   
  
 clonazePAM 1 mg tablet Commonly known as:  Polly Side Take 1 Tab by mouth daily as needed. 1 mg  
    
   
   
   
  
 furosemide 40 mg tablet Commonly known as:  LASIX Your next dose is:  Tomorrow TAKE 1 TABLET BY MOUTH EVERY DAY Start tomorrow  
   
   
   
  
 isosorbide mononitrate ER 60 mg CR tablet Commonly known as:  IMDUR Your next dose is: Today Take 60 mg by mouth two (2) times a day. 60 mg  
    
   
   
Start today  
   
  
 levothyroxine 25 mcg tablet Commonly known as:  SYNTHROID Your next dose is:  Tomorrow Take 1 Tab by mouth Daily (before breakfast). 25 mcg Start in the am  
   
   
   
  
 meclizine 25 mg tablet Commonly known as:  ANTIVERT Notes to Patient:  Resume normal schedule One tab three times a day as needed for vertigo multivitamin tablet Commonly known as:  ONE A DAY Notes to Patient:  Resume normal schedule Take 1 Tab by mouth daily. Stopped 1/12/18  Indications: did not hold 1 Tab  
    
   
   
   
  
 nitroglycerin 0.4 mg SL tablet Commonly known as:  NITROSTAT  
   
 1 Tab by SubLINGual route every five (5) minutes as needed. 0.4 mg  
    
   
   
   
  
 olmesartan 40 mg tablet Commonly known as:  [employer] Take 1 Tab by mouth every evening. 40 mg  
    
   
   
   
  
 omeprazole 20 mg capsule Commonly known as:  PRILOSEC Notes to Patient:  Resume normal schedule TAKE 1 CAPSULE BY MOUTH DAILY. INDICATIONS: GASTROESOPHAGEAL REFLUX, HEARTBURN  
     
   
   
   
  
 ranolazine  mg SR tablet Commonly known as:  RANEXA Your next dose is:  Tomorrow Take 500 mg by mouth daily. 500 mg Start tomorrow STOP taking these medications EFFIENT 10 mg tablet Generic drug:  prasugrel  
   
  
 rivaroxaban 20 mg Tab tablet Commonly known as:  Keenan Melgar Where to Get Your Medications Information on where to get these meds will be given to you by the nurse or doctor. ! Ask your nurse or doctor about these medications  
  enoxaparin 120 mg/0.8 mL injection  
 senna-docusate 8.6-50 mg per tablet  
 traMADol 50 mg tablet  
 warfarin 5 mg tablet Discharge Instructions DISCHARGE SUMMARY from Nurse PATIENT INSTRUCTIONS: 
 
 
F-face looks uneven A-arms unable to move or move unevenly S-speech slurred or non-existent T-time-call 911 as soon as signs and symptoms begin-DO NOT go Back to bed or wait to see if you get better-TIME IS BRAIN. Warning Signs of HEART ATTACK Call 911 if you have these symptoms: ? Chest discomfort. Most heart attacks involve discomfort in the center of the chest that lasts more than a few minutes, or that goes away and comes back. It can feel like uncomfortable pressure, squeezing, fullness, or pain. ? Discomfort in other areas of the upper body. Symptoms can include pain or discomfort in one or both arms, the back, neck, jaw, or stomach. ? Shortness of breath with or without chest discomfort. ? Other signs may include breaking out in a cold sweat, nausea, or lightheadedness. Don't wait more than five minutes to call [address]! Fast action can save your life. Calling 911 is almost always the fastest way to get lifesaving treatment. Emergency Medical Services staff can begin treatment when they arrive  up to an hour sooner than if someone gets to the hospital by car. The discharge information has been reviewed with the {PATIENT PARENT GUARDIAN:77479}. The {PATIENT PARENT GUARDIAN:11481} verbalized understanding. Discharge medications reviewed with the {Dishcarge meds reviewed QTVL:96985} and appropriate educational materials and side effects teaching were provided. ___________________________________________________________________________________________________________________________________ TeamVisibility Announcement We are excited to announce that we are making your provider's discharge notes available to you in TeamVisibility. You will see these notes when they are completed and signed by the physician that discharged you from your recent hospital stay. If you have any questions or concerns about any information you see in BackTrackt, please call the Health Information Department where you were seen or reach out to your Primary Care Provider for more information about your plan of care. Introducing [facility]! Dear Payton Givens: Thank you for requesting a TeamVisibility account.   Our records indicate that you have previously registered for a Octonius account but its currently inactive. Please call our Octonius support line at [phone]. Additional Information If you have questions, please visit the Frequently Asked Questions section of the Octonius website at [email or web address] Remember, MyChart is NOT to be used for urgent needs. For medical emergencies, dial 911. Now available from your iPhone and Android! Providers Seen During Your Hospitalization Provider Specialty Primary office phone Ramin Corbin MD Emergency Medicine [phone] Luis Duncan MD Vascular Surgery [phone] Your Primary Care Physician (PCP) Primary Care Physician Office Phone Office Fax UNKNOWN, PROVIDER ** None ** ** None ** You are allergic to the following No active allergies Recent Documentation Height Weight BMI OB Status Smoking Status 1.702 m 107.7 kg 37.2 kg/m2 Hysterectomy Former Smoker Emergency Contacts Name Discharge Info Relation Home Work Mobile Jorge Braxton DISCHARGE CAREGIVER [3] Friend [5] [phone] Patient Belongings The following personal items are in your possession at time of discharge: 
  Dental Appliances: Uppers  Visual Aid: Glasses      Home Medications: None   Jewelry: None  Clothing: Shirt, Pants, Footwear    Other Valuables: Eyeglasses  Personal Items Sent to Safe: Valuables locked up with security. Clothes taken to PACU. Discharge Instructions Attachments/References ANGIOGRAM: GENERAL: POST-OP (ENGLISH) HEALTHY DIET: HEART (ENGLISH) SMOKING CESSATION: HEALTH BENEFITS: GENERAL INFO (ENGLISH) MEFS - ENOXAPARIN (LOVENOX) - (BY INJECTION) (ENGLISH) MEFS - WARFARIN (COUMADIN, JANTOVEN) - (BY MOUTH) (ENGLISH) MEFS - TRAMADOL (ULTRAM, ULTRAM ER, RYZOLT, THERATRAMADOL-60) - (BY MOUTH) (ENGLISH) Patient Handouts Angiogram: What to Expect at [facility] Your Recovery An angiogram is an X-ray test that uses dye and a camera to take pictures of the blood flow in an artery or a vein. The doctor inserted a thin, flexible tube (catheter) into a blood vessel in your groin. In some cases, the catheter is placed in a blood vessel in the arm. An angiogram is done for many reasons. For example, you may have an angiogram to find the source of bleeding, such as an ulcer. Or it may be done to look for blocked blood vessels in your lungs. After an angiogram, your groin or arm may have a bruise and feel sore for a day or two. You can do light activities around the house but nothing strenuous for several days. Your doctor may give you specific instructions on when you can do your normal activities again, such as driving and going back to work. This care sheet gives you a general idea about how long it will take for you to recover. But each person recovers at a different pace. Follow the steps below to feel better as quickly as possible. How can you care for yourself at home? Activity ? · Do not do strenuous exercise and do not lift, pull, or push anything heavy until your doctor says it is okay. This may be for a day or two. You can walk around the house and do light activity, such as cooking. ? · You may shower 24 to 48 hours after the procedure, if your doctor okays it. Pat the incision dry. Do not take a bath for 1 week, or until your doctor tells you it is okay. ? · If the catheter was placed in your groin, try not to walk up stairs for the first couple of days. ? · If the catheter was placed in your arm near your wrist, do not bend your wrist deeply for the first couple of days. Be careful using your hand to get into and out of a chair or bed. ? · If your doctor recommends it, get more exercise. Walking is a good choice. Bit by bit, increase the amount you walk every day. Try for at least 30 minutes on most days of the week. Diet ? · Drink plenty of fluids to help your body flush out the dye. If you have kidney, heart, or liver disease and have to limit fluids, talk with your doctor before you increase the amount of fluids you drink. ? · You can eat your normal diet. If your stomach is upset, try bland, low-fat foods like plain rice, broiled chicken, toast, and yogurt. Medicines ? · Be safe with medicines. Read and follow all instructions on the label. ¨ If the doctor gave you a prescription medicine for pain, take it as prescribed. ¨ If you are not taking a prescription pain medicine, ask your doctor if you can take an over-the-counter medicine. ? · If you take blood thinners, such as warfarin (Coumadin), clopidogrel (Plavix), or aspirin, be sure to talk to your doctor. He or she will tell you if and when to start taking those medicines again. Make sure that you understand exactly what your doctor wants you to do.  
? · Your doctor will tell you if and when you can restart your medicines. He or she will also give you instructions about taking any new medicines. ?Care of the catheter site ? · You will have a dressing over the cut (incision). A dressing helps the incision heal and protects it. Your doctor will tell you how to take care of this. ? · Put ice or a cold pack on the area for 10 to 20 minutes at a time to help with soreness or swelling. Put a thin cloth between the ice and your skin. Follow-up care is a key part of your treatment and safety. Be sure to make and go to all appointments, and call your doctor if you are having problems. It's also a good idea to know your test results and keep a list of the medicines you take. When should you call for help? Call 911 anytime you think you may need emergency care. For example, call if: 
? · You passed out (lost consciousness). ? · You have severe trouble breathing. ? · You have sudden chest pain and shortness of breath, or you cough up blood. ?Call your doctor now or seek immediate medical care if: 
? · You are bleeding from the area where the catheter was put in your artery. ? · You have a fast-growing, painful lump at the catheter site. ? · You have signs of infection, such as: 
¨ Increased pain, swelling, warmth, or redness. ¨ Red streaks leading from the incision. ¨ Pus draining from the incision. ¨ A fever. ? Watch closely for any changes in your health, and be sure to contact your doctor if: 
? · You don't get better as expected. Where can you learn more? Go to http://mitchell-karen.info/. Enter D981 in the search box to learn more about \"Angiogram: What to Expect at Home. \" Current as of: October 14, 2016 Content Version: 11.4 © 6011-4045 [employer]way. Care instructions adapted under license by Atmail (which disclaims liability or warranty for this information). If you have questions about a medical condition or this instruction, always ask your healthcare professional. [address] any warranty or liability for your use of this information. Heart-Healthy Diet: Care Instructions Your Care Instructions A heart-healthy diet has lots of vegetables, fruits, nuts, beans, and whole grains, and is low in salt. It limits foods that are high in saturated fat, such as meats, cheeses, and fried foods. It may be hard to change your diet, but even small changes can lower your risk of heart attack and heart disease. Follow-up care is a key part of your treatment and safety. Be sure to make and go to all appointments, and call your doctor if you are having problems. It's also a good idea to know your test results and keep a list of the medicines you take. How can you care for yourself at home? Watch your portions · Learn what a serving is. A \"serving\" and a \"portion\" are not always the same thing.  Make sure that you are not eating larger portions than are recommended. For example, a serving of pasta is ½ cup. A serving size of meat is 2 to 3 ounces. A 3-ounce serving is about the size of a deck of cards. Measure serving sizes until you are good at [place]" them. Keep in mind that restaurants often serve portions that are 2 or 3 times the size of one serving. · To keep your energy level up and keep you from feeling hungry, eat often but in smaller portions. · Eat only the number of calories you need to stay at a healthy weight. If you need to lose weight, eat fewer calories than your body burns (through exercise and other physical activity). Eat more fruits and vegetables · Eat a variety of fruit and vegetables every day. Dark green, deep orange, red, or yellow fruits and vegetables are especially good for you. Examples include spinach, carrots, peaches, and berries. · Keep carrots, celery, and other veggies handy for snacks. Buy fruit that is in season and store it where you can see it so that you will be tempted to eat it. · Cook dishes that have a lot of veggies in them, such as stir-fries and soups. Limit saturated and trans fat · Read food labels, and try to avoid saturated and trans fats. They increase your risk of heart disease. Trans fat is found in many processed foods such as cookies and crackers. · Use olive or canola oil when you cook. Try cholesterol-lowering spreads, such as Benecol or Take Control. · Bake, broil, grill, or steam foods instead of frying them. · Choose lean meats instead of high-fat meats such as hot dogs and sausages. Cut off all visible fat when you prepare meat. · Eat fish, skinless poultry, and meat alternatives such as soy products instead of high-fat meats. Soy products, such as tofu, may be especially good for your heart. · Choose low-fat or fat-free milk and dairy products. Eat fish · Eat at least two servings of fish a week.  Certain fish, such as salmon and tuna, contain omega-3 fatty acids, which may help reduce your risk of heart attack. Eat foods high in fiber · Eat a variety of grain products every day. Include whole-grain foods that have lots of fiber and nutrients. Examples of whole-grain foods include oats, whole wheat bread, and brown rice. · Buy whole-grain breads and cereals, instead of white bread or pastries. Limit salt and sodium · Limit how much salt and sodium you eat to help lower your blood pressure. · Taste food before you salt it. Add only a little salt when you think you need it. With time, your taste buds will adjust to less salt. · Eat fewer snack items, fast foods, and other high-salt, processed foods. Check food labels for the amount of sodium in packaged foods. · Choose low-sodium versions of canned goods (such as soups, vegetables, and beans). Limit sugar · Limit drinks and foods with added sugar. These include candy, desserts, and soda pop. Limit alcohol · Limit alcohol to no more than 2 drinks a day for men and 1 drink a day for women. Too much alcohol can cause health problems. When should you call for help? Watch closely for changes in your health, and be sure to contact your doctor if: 
? · You would like help planning heart-healthy meals. Where can you learn more? Go to http://mitchell-karen.info/. Enter V137 in the search box to learn more about \"Heart-Healthy Diet: Care Instructions. \" Current as of: September 21, 2016 Content Version: 11.4 © 7642-2495 Gild. Care instructions adapted under license by Zalicus (which disclaims liability or warranty for this information). If you have questions about a medical condition or this instruction, always ask your healthcare professional. [place] [address] any warranty or liability for your use of this information. Learning About Benefits From Quitting Smoking How does quitting smoking make you healthier? If you're thinking about quitting smoking, you may have a few reasons to be smoke-free. Your health may be one of them. · When you quit smoking, you lower your risks for cancer, lung disease, heart attack, stroke, blood vessel disease, and blindness from macular degeneration. · When you're smoke-free, you get sick less often, and you heal faster. You are less likely to get colds, flu, bronchitis, and pneumonia. · As a nonsmoker, you may find that your mood is better and you are less stressed. When and how will you feel healthier? Quitting has real health benefits that start from day 1 of being smoke-free. And the longer you stay smoke-free, the healthier you get and the better you feel. The first hours · After just 20 minutes, your blood pressure and heart rate go down. That means there's less stress on your heart and blood vessels. · Within 12 hours, the level of carbon monoxide in your blood drops back to normal. That makes room for more oxygen. With more oxygen in your body, you may notice that you have more energy than when you smoked. After 2 weeks · Your lungs start to work better. · Your risk of heart attack starts to drop. After 1 month · When your lungs are clear, you cough less and breathe deeper, so it's easier to be active. · Your sense of taste and smell return. That means you can enjoy food more than you have since you started smoking. Over the years · After 1 year, your risk of heart disease is half what it would be if you kept smoking. · After 5 years, your risk of stroke starts to shrink. Within a few years after that, it's about the same as if you'd never smoked. · After 10 years, your risk of dying from lung cancer is cut by about half. And your risk for many other types of cancer is lower too. How would quitting help others in your life? When you quit smoking, you improve the health of everyone who now breathes in your smoke. · Their heart, lung, and cancer risks drop, much like yours. · They are sick less. For babies and small children, living smoke-free means they're less likely to have ear infections, pneumonia, and bronchitis. · If you're a woman who is or will be pregnant someday, quitting smoking means a healthier . · Children who are close to you are less likely to become adult smokers. Where can you learn more? Go to http://mitchell-karen.info/. Enter 052 [id number] 72 11 in the search box to learn more about \"Learning About Benefits From Quitting Smoking. \" Current as of: 2017 Content Version: 11.4 © 0507-7625 [employer] Computing. Care instructions adapted under license by ETARGET (which disclaims liability or warranty for this information). If you have questions about a medical condition or this instruction, always ask your healthcare professional. [place] [address] any warranty or liability for your use of this information. Enoxaparin (Lovenox) - (By injection) Why this medicine is used:  
Prevents and treats blood clots. Contact a nurse or doctor right away if you have: 
· Sudden or severe headache · Back pain, numbness, tingling, or weakness in your legs or feet · Red or black, tarry stools; loss of bladder or bowel control · Bloody vomit or vomit that looks like coffee grounds · Bleeding that does not stop or bruises that do not heal  
 
Common side effects: · Minor bleeding or bruising · Pain, redness, swelling, or a lump where the shot was given ©  [address] Information is for End User's use only and may not be sold, redistributed or otherwise used for commercial purposes. Warfarin (Coumadin, Jantoven) - (By mouth) Why this medicine is used:  
Prevents and treats blood clots. Contact a nurse or doctor right away if you have: 
· Sudden or severe headache · Red or dark brown urine, or red or black, tarry stools · Bloody vomit or vomit that looks like coffee grounds · Bleeding that does not stop or bruises that do not heal 
· Purplish red, net-like, blotchy spots on the skin Common side effects: · Minor bleeding or bruising © 2017 [address] Information is for End User's use only and may not be sold, redistributed or otherwise used for commercial purposes. Tramadol (Ultram, Ultram ER, [place], Theratramadol-60) - (By mouth) Why this medicine is used:  
Treats moderate to severe pain. This medicine is a narcotic pain reliever. Contact a nurse or doctor right away if you have: 
· Extreme weakness, shallow breathing · Seizures · Seeing or hearing things that are not there · Anxiety, restlessness, muscle spasms, fever, sweating, diarrhea · Slow or fast heartbeat Common side effects: 
· Nausea, vomiting, constipation · Headache, drowsiness · Itching, feeling of warmth, redness of the face, neck, arms, and upper chest 
© 2017 [address] Information is for End User's use only and may not be sold, redistributed or otherwise used for commercial purposes. Please provide this summary of care documentation to your next provider. Signatures-by signing, you are acknowledging that this After Visit Summary has been reviewed with you and you have received a copy. Patient Signature:  ____________________________________________________________ Date:  ____________________________________________________________  
  
Ernie Payor Provider Signature:  ____________________________________________________________ Date:  ____________________________________________________________

## 2018-03-01 NOTE — PROGRESS NOTES
Patient status post left lower extremity thrombolyzes for occluded left SFA and proximal popliteal artery with tibial disease. Patient will continue TPA, 1 mg/ hr through the catheter, 500 units of heparin through the sheath, will check coags every 4 hours as dionicioo will plan for follow-upl. Catheter check later on this afternoon. Patient will continue to be n.p.o. with IV fluids.     José Miguel Hartley

## 2018-03-01 NOTE — ED PROVIDER NOTES
HPI Comments: 61-year-old female presents with an episode of recurrent left lower extremity pain, cramping  States that her left foot feels very cold    She reports the symptoms are similar to her previous arterial occlusion, which required stenting approximately 6 weeks ago. She has followed up for recheck with Dr. Judah Arriola after the procedure. She is also being seen by the [address] [place] [address] vascular surgeons Dr. Oswaldo Velasquez. She was actually seen by them 2 days ago. Patient had normal ABIs and toe pressures at that office visit. Patient is a 50 y.o. female presenting with leg pain. The history is provided by the patient. Leg Pain    This is a recurrent problem. The current episode started 6 to 12 hours ago. The problem occurs constantly. The problem has not changed since onset. The pain is present in the left upper leg and left lower leg. The quality of the pain is described as aching and constant. The pain is severe. Associated symptoms include numbness, stiffness and tingling. Pertinent negatives include no back pain and no neck pain. The symptoms are aggravated by activity, contact and movement. She has tried nothing for the symptoms. There has been no history of extremity trauma.         Past Medical History:   Diagnosis Date    Arthritis     CAD (coronary artery disease) CABG 2009    cabg 2009-- stent x 1--2015-- followed by dr. Mandy Fermin Chronic back pain states cannot lie flat due to back pain    Chronic kidney disease     stents x 2--    Chronic obstructive pulmonary disease (HCC)     daily inhaler---- no home O2    Chronic pain     Coagulation defect ([address]     DDD (degenerative disc disease), lumbar     Depression     Fibromyalgia     GERD (gastroesophageal reflux disease)     controlled with med    Hypercholesterolemia     Hypertension     controlled with med    Joint ache     Left leg pain     Liver disease     enlarged per pt    Memory impairment     MI, old x 2    2009    Narcolepsy  Obesity (BMI 30-39.9)     36.6    Persistent insomnia      [facility]per[facility] vascular [facility])     S/P CABG x 4     Stented coronary artery     EF 55-60% echo 12/15    Thyroid disease     hypo    Vertigo        Past Surgical History:   Procedure Laterality Date    HX  SECTION      x3    HX CHOLECYSTECTOMY      HX CORONARY ARTERY BYPASS GRAFT  2009    x 4 grafts    HX CORONARY STENT PLACEMENT  2015    x1 spring    HX HYSTERECTOMY      HX ORTHOPAEDIC      left knee arthroscopy     HX OTHER SURGICAL  2010    renal stents     HX SALPINGO-OOPHORECTOMY      VASCULAR SURGERY PROCEDURE UNLIST Left 01/15/2018    arteriogram fem and pop angio w/stenting    VASCULAR SURGERY PROCEDURE UNLIST Left 2018    arteriogram, fem pop thromectomy    VASCULAR SURGERY PROCEDURE UNLIST Left 2018    f/u lysis         Family History:   Problem Relation Age of Onset    Diabetes Mother     Heart Disease Mother     Kidney Disease Mother     Lung Disease Mother     Diabetes Father     Heart Disease Father     Kidney Disease Father     Lung Disease Father        Social History     Social History    Marital status: SINGLE     Spouse name: N/A    Number of children: N/A    Years of education: N/A     Occupational History    Not on file. Social History Main Topics    Smoking status: Former Smoker     Packs/day: 1.00     Years: 36.00     Types: Cigarettes     Quit date: 10/9/2016    Smokeless tobacco: Never Used    Alcohol use No      Comment:      Drug use: No    Sexual activity: Not on file     Other Topics Concern    Not on file     Social History Narrative         ALLERGIES: Review of patient's allergies indicates no known allergies. Review of Systems   Constitutional: Negative for chills and fever. HENT: Negative for congestion, ear pain and rhinorrhea. Eyes: Negative for photophobia and discharge. Respiratory: Negative for cough and shortness of breath. Cardiovascular: Negative for chest pain and palpitations. Gastrointestinal: Negative for abdominal pain, constipation, diarrhea and vomiting. Endocrine: Negative for cold intolerance and heat intolerance. Genitourinary: Negative for dysuria and flank pain. Musculoskeletal: Positive for stiffness. Negative for arthralgias, back pain, myalgias and neck pain. Skin: Negative for rash and wound. Allergic/Immunologic: Negative for environmental allergies and food allergies. Neurological: Positive for tingling and numbness. Negative for syncope and headaches. Hematological: Negative for adenopathy. Does not bruise/bleed easily. Psychiatric/Behavioral: Negative for dysphoric mood. The patient is not nervous/anxious. All other systems reviewed and are negative. Vitals:    02/28/18 1806   BP: 126/75   Pulse: 84   Resp: 16   Temp: 98 °F (36.7 °C)   SpO2: 97%   Weight: 99.8 kg (220 lb)   Height: 5' 7\" (1.702 m)            Physical Exam   Constitutional: She is oriented to person, place, and time. She appears well-developed and well-nourished. She appears distressed. HENT:   Head: Normocephalic and atraumatic. Mouth/Throat: Oropharynx is clear and moist.   Eyes: Conjunctivae and EOM are normal. Pupils are equal, round, and reactive to light. Right eye exhibits no discharge. Left eye exhibits no discharge. No scleral icterus. Neck: Normal range of motion. Neck supple. No thyromegaly present. Cardiovascular: Normal rate, regular rhythm, normal heart sounds and intact distal pulses. Exam reveals no gallop and no friction rub. No murmur heard. Pulmonary/Chest: Effort normal and breath sounds normal. No respiratory distress. She has no wheezes. She exhibits no tenderness. Abdominal: Soft. Bowel sounds are normal. She exhibits no distension. There is no hepatosplenomegaly. There is no tenderness. There is no rebound and no guarding. No hernia. Musculoskeletal: Normal range of motion.  She exhibits no edema or tenderness. Left lower extremity:    Doppler signals are strong at the dorsalis pedis, thing to help her signals are found at posterior tibial    Capillary refill is right at 2 seconds. Patient is able to flex her toes without significant discomfort. No lesions or sores are noted    There is no edema, no redness, no warmth   Neurological: She is alert and oriented to person, place, and time. No cranial nerve deficit. She exhibits normal muscle tone. Skin: Skin is warm. No rash noted. She is not diaphoretic. No erythema. Psychiatric: Her behavior is normal. Judgment and thought content normal. Her mood appears anxious. Nursing note and vitals reviewed. MDM  Number of Diagnoses or Management Options  Left leg pain: new and does not require workup  Diagnosis management comments: Discussed case with Dr. Kemp Peabody from the vascular service. Given the recent workup, positive Doppler signals tonight and ability to move toes without pain. He does not feel that there is an acute vascular emergency. States that Dr. Amber Murphy will be all see her tomorrow in the office for recheck. I discussed our current findings with the patient. I have offered her medications along the lines to treat neuropathy. She declines any medications at this time states that she was follow-up with her doctors tomorrow. Amount and/or Complexity of Data Reviewed  Review and summarize past medical records: yes  Discuss the patient with other providers: yes    Risk of Complications, Morbidity, and/or Mortality  Presenting problems: moderate  Diagnostic procedures: low  Management options: low  General comments: Elements of this note have been dictated via voice recognition software. Text and phrases may be limited by the accuracy of the software. The chart has been reviewed, but errors may still be present.       Patient Progress  Patient progress: stable        ED Course       Procedures

## 2018-03-01 NOTE — PERIOP NOTES
Report called to ICU. Pt will being going back to the OR from Recovery room . After second procedure pt will go to the ICU bed 104 per ICU nurse.

## 2018-03-01 NOTE — BRIEF OP NOTE
[address] FAX: [phone]    Brief Op Note Template Note    Pre-Op Diagnosis: COLD LEFT FOOT    Post-Op Diagnosis:  COLD LEFT FOOT    Procedures: Procedure(s):  ARTERIOGRAM LEFT LOWER EXTREMITY LYSIS CATHETER PLACEMENT    Surgeon: Arely Peterson MD    Assistants: Surgeon(s):  Arely Peterson MD      Anesthesia:  General     Findings: Left SFA proximal occlusion, occlusion of SFA stent and proximal popliteal artery stent, reconstitutes of the popliteal artery and tibioperoneal trunk with occlusion of distal tibial vessels above the malleolus    Tourniquet Time:  * No tourniquets in log *    Estimated Blood Loss:               Specimens:none           Implants:  * No implants in log *    Complications: None               Signed: Arely Peterson MD      Elements of this note have been dictated using speech recognition software. As a result, errors of speech recognition may have occurred.

## 2018-03-01 NOTE — BRIEF OP NOTE
[address]   [address] FAX: [phone]    Brief Op Note Template Note    Pre-Op Diagnosis: S/P COLD FOOT    Post-Op Diagnosis:  S/P COLD FOOT    Procedures: Procedure(s):  LEFT LOWER EXTREMITY LYSIS RECHECK     Surgeon: Fay Kulkarni MD    Assistants: Surgeon(s):  Fay Kulkarni MD      Anesthesia:  MAC     Findings: SFA and popliteal stent patent still with some intraluminal thrombus throughout tibioperoneal trunk patent anterior tibial band dominant runoff continuously down to the foot    Tourniquet Time:  * No tourniquets in log *    Estimated Blood Loss:               Specimens: None           Implants:  * No implants in log *    Complications: None               Signed: Fay Kulkarni MD      Elements of this note have been dictated using speech recognition software. As a result, errors of speech recognition may have occurred.

## 2018-03-01 NOTE — ANESTHESIA POSTPROCEDURE EVALUATION
Post-Anesthesia Evaluation and Assessment    Patient: Sancho Currie MRN: [id number]  SSN: [id number]    YOB: 1965  Age: 50 y.o. Sex: female       Cardiovascular Function/Vital Signs  Visit Vitals    /80 (BP Patient Position: At rest)    Pulse 73    Temp 36.5 °C (97.7 °F)    Resp 15    Ht 5' 7\" (1.702 m)    Wt 97.5 kg (215 lb)    SpO2 99%    BMI 33.67 kg/m2       Patient is status post general anesthesia for Procedure(s):  LEFT LOWER EXTREMITY LYSIS RECHECK . Nausea/Vomiting: None    Postoperative hydration reviewed and adequate. Pain:  Pain Scale 1: Visual (03/01/18 1401)  Pain Intensity 1: 0 (03/01/18 1401)   Managed    Neurological Status:   Neuro (WDL): Within Defined Limits (03/01/18 1047)   At baseline    Mental Status and Level of Consciousness: Arousable    Pulmonary Status:   O2 Device: Nasal cannula (03/01/18 1401)   Adequate oxygenation and airway patent    Complications related to anesthesia: None    Post-anesthesia assessment completed.  No concerns    Signed By: Elvin Bearden MD     March 1, 2018

## 2018-03-01 NOTE — ED PROVIDER NOTES
HPI Comments: 53-year-old female complaining of left leg pain. Pain started around noon today while she was at a movie theater. Pain is progressively got worse through the evening. She tried to go to bed tonight but could not sleep because of the pain. The patient has a long history of fibromyalgia chronic pain syndrome and narcolepsy she also has a history of depression. Patient has a history of a CABG in 2009 and stents in 2015 at which time she had a ejection fraction of 55-60%. Patient is on Xarelto and has not missed any doses. She's been on that for 2 years. Review of her chart earlier showed a pulse in that leg. And also previous workups for the same complaint been negative recently. Patient is a 50 y.o. female presenting with leg pain. The history is provided by the patient. Leg Pain    This is a new problem. The current episode started 12 to 24 hours ago. The problem occurs constantly. The pain is present in the left lower leg. The quality of the pain is described as aching. The pain is at a severity of 10/10. The pain is severe. She has tried nothing for the symptoms. There has been no history of extremity trauma.         Past Medical History:   Diagnosis Date    Arthritis     CAD (coronary artery disease) CABG 2009    cabg 2009-- stent x 1--2015-- followed by dr. Sanya Lowry Chronic back pain states cannot lie flat due to back pain    Chronic kidney disease     stents x 2--    Chronic obstructive pulmonary disease (HCC)     daily inhaler---- no home O2    Chronic pain     Coagulation defect ([address]     DDD (degenerative disc disease), lumbar     Depression     Fibromyalgia     GERD (gastroesophageal reflux disease)     controlled with med    Hypercholesterolemia     Hypertension     controlled with med    Joint ache     Left leg pain     Liver disease     enlarged per pt    Memory impairment     MI, old x 2    2009    Narcolepsy     Obesity (BMI 30-39.9)     36.6    Persistent insomnia      [facility]per[facility]) [facility])     S/P CABG x 4     Stented coronary artery     EF 55-60% echo 12/15    Thyroid disease     hypo    Vertigo        Past Surgical History:   Procedure Laterality Date    HX  SECTION      x3    HX CHOLECYSTECTOMY      HX CORONARY ARTERY BYPASS GRAFT  2009    x 4 grafts    HX CORONARY STENT PLACEMENT  2015    x1 spring    HX HYSTERECTOMY      HX ORTHOPAEDIC      left knee arthroscopy     HX OTHER SURGICAL  2010    renal stents     HX SALPINGO-OOPHORECTOMY      VASCULAR SURGERY PROCEDURE UNLIST Left 01/15/2018    arteriogram fem and pop angio w/stenting    VASCULAR SURGERY PROCEDURE UNLIST Left 2018    arteriogram, fem pop thromectomy    VASCULAR SURGERY PROCEDURE UNLIST Left 2018    f/u lysis         Family History:   Problem Relation Age of Onset    Diabetes Mother     Heart Disease Mother     Kidney Disease Mother     Lung Disease Mother     Diabetes Father     Heart Disease Father     Kidney Disease Father     Lung Disease Father        Social History     Social History    Marital status: SINGLE     Spouse name: N/A    Number of children: N/A    Years of education: N/A     Occupational History    Not on file. Social History Main Topics    Smoking status: Former Smoker     Packs/day: 1.00     Years: 36.00     Types: Cigarettes     Quit date: 10/9/2016    Smokeless tobacco: Never Used    Alcohol use No      Comment:      Drug use: No    Sexual activity: Not on file     Other Topics Concern    Not on file     Social History Narrative         ALLERGIES: Review of patient's allergies indicates no known allergies. Review of Systems   Constitutional: Negative. Negative for activity change. HENT: Negative. Eyes: Negative. Respiratory: Negative. Cardiovascular: Negative. Gastrointestinal: Negative. Genitourinary: Negative. Musculoskeletal: Negative. Skin: Negative. Neurological: Negative. Psychiatric/Behavioral: Negative. All other systems reviewed and are negative. Vitals:    03/01/18 0129   Pulse: 100   Resp: 20   Temp: 98 °F (36.7 °C)   SpO2: 100%   Weight: 99.8 kg (220 lb)   Height: 5' 7\" (1.702 m)            Physical Exam   Constitutional: She is oriented to person, place, and time. She appears well-developed and well-nourished. No distress. HENT:   Head: Normocephalic and atraumatic. Right Ear: External ear normal.   Left Ear: External ear normal.   Nose: Nose normal.   Mouth/Throat: Oropharynx is clear and moist. No oropharyngeal exudate. Eyes: Conjunctivae and EOM are normal. Pupils are equal, round, and reactive to light. Right eye exhibits no discharge. Left eye exhibits no discharge. No scleral icterus. Neck: Normal range of motion. Neck supple. No JVD present. No tracheal deviation present. Cardiovascular: Normal rate, regular rhythm and intact distal pulses. Pulses:       Femoral pulses are 1+ on the right side, and 1+ on the left side. Popliteal pulses are 0 on the left side. Dorsalis pedis pulses are 1+ on the right side, and 0 on the left side. Posterior tibial pulses are 1+ on the right side, and 0 on the left side. Pulmonary/Chest: Effort normal and breath sounds normal. No stridor. No respiratory distress. She has no wheezes. She exhibits no tenderness. Abdominal: Soft. Bowel sounds are normal. She exhibits no distension and no mass. There is no tenderness. Musculoskeletal: Normal range of motion. She exhibits no edema or tenderness. Left ankle: She exhibits abnormal pulse. Achilles tendon exhibits pain. Legs:  Neurological: She is alert and oriented to person, place, and time. No cranial nerve deficit. Skin: Skin is warm and dry. No rash noted. She is not diaphoretic. No erythema. No pallor. Psychiatric: She has a normal mood and affect.  Her behavior is normal. Thought content normal. Nursing note and vitals reviewed. MDM  Number of Diagnoses or Management Options  Ischemia of left lower extremity:   Diagnosis management comments: After triage an ultrasound of the lower extremity was ordered. The ultrasound tech was at the other facility performing another study. Vascular surgery was consult with to see if we could arrange transfer the patient down to the other facility to get the ultrasound done however in the process of doing that ultrasound tech was able to come to our facility. Ultrasound was positive for occlusion patient will need CTA with runoff  Decision for transfer to facilitate getting the patient to the facility that surgery could be performed if needed.        Amount and/or Complexity of Data Reviewed  Clinical lab tests: ordered and reviewed  Tests in the radiology section of CPT®: ordered and reviewed  Tests in the medicine section of CPT®: ordered and reviewed    Risk of Complications, Morbidity, and/or Mortality  Presenting problems: high  Diagnostic procedures: high  Management options: high          ED Course       Procedures

## 2018-03-01 NOTE — ED NOTES
Went to patient's bed on two separate occasions to attempt discharge instructions.  Patient was not in the bed, nor in the bathroom or surrounding rooms

## 2018-03-01 NOTE — ANESTHESIA POSTPROCEDURE EVALUATION
Post-Anesthesia Evaluation and Assessment    Patient: Demetrio Screen MRN: [id number]  SSN: [id number]    YOB: 1965  Age: 50 y.o. Sex: female       Cardiovascular Function/Vital Signs  Visit Vitals    /84    Pulse 82    Temp 36.5 °C (97.7 °F)    Resp 16    Ht 5' 7\" (1.702 m)    Wt 97.5 kg (215 lb)    SpO2 100%    BMI 33.67 kg/m2       Patient is status post total IV anesthesia anesthesia for Procedure(s):  ARTERIOGRAM LEFT LOWER EXTREMITY WITH LYSIS CATHETER PLACEMENT. Nausea/Vomiting: None    Postoperative hydration reviewed and adequate. Pain:  Pain Scale 1: Numeric (0 - 10) (03/01/18 0650)  Pain Intensity 1: 9 (03/01/18 0650)   Managed    Neurological Status: At baseline    Mental Status and Level of Consciousness: Arousable    Pulmonary Status:   O2 Device: Oxygen mask (03/01/18 0910)   Adequate oxygenation and airway patent    Complications related to anesthesia: None    Post-anesthesia assessment completed. No concerns    Signed By: Darren Valentin MD     March 1, 2018            Patient complaining of pain in her back, will attempt positioning as much as possible given sheath and groin access and will   Supplement pain medication as tolerated.

## 2018-03-01 NOTE — IP AVS SNAPSHOT
Summary of Care Report The Summary of Care report has been created to help improve care coordination. Users with access to [employer] or [employer] [address] (Web-based application) may access additional patient information including the Discharge Summary. If you are not currently a [address] user and need more information, please call the number listed below in the [place] [address] section and ask to be connected with Medical Records. Facility Information Name Address Phone [address] [address] [address] [phone] Patient Information Patient Name Sex  Ramiro Hernandez ([id number]) Female 1965 Discharge Information Admitting Provider Service [place] / [id number] [facility] [address] / [phone] Discharge Provider Discharge Date/Time Discharge Disposition Destination (none) 3/5/2018 Midday (Pending) AHR (none) Patient Language Language ENGLISH [13] Hospital Problems as of 3/5/2018  Reviewed: 3/5/2018 10:23 AM by CATHY Beard Class Noted - Resolved Last Modified POA Active Problems * (Principal)Ischemia of left lower extremity  3/1/2018 - Present 3/2/2018 by CATHY Beard Yes Entered by Agapito Romberg, MD  
  Ischemic leg  3/1/2018 - Present 3/2/2018 by CATHY Beard Yes Entered by Agapito Romberg, MD  
  Tobacco use disorder  3/2/2018 - Present 3/2/2018 by CATHY Beard Yes Entered by CATHY Beard Non-Hospital Problems as of 3/5/2018  Reviewed: 3/5/2018 10:23 AM by CATHY Beard Class Noted - Resolved Last Modified Active Problems   Bilateral low back pain without sciatica  2015 - Present 2015 by Walter Madison MD  
  Entered by Walter Madison MD  
 Hypersomnia  12/6/2015 - Present 6/1/2017 by Freddy Spears MD  
  Entered by Elias Trejo MD  
  Obesity  12/6/2015 - Present 12/6/2015 by Elias Trejo MD  
  Entered by Elias Trejo MD  
  Obesity, Class I, BMI 30-34.9  12/6/2015 - Present 9/29/2017 by Freddy Spears MD  
  Entered by Elias Trejo MD  
  Hyperlipidemia  1/28/2016 - Present 7/21/2017 by Elias Trejo MD  
  Entered by Elias Trejo MD  
  Essential hypertension  1/28/2016 - Present 1/28/2016 by Elias Trejo MD  
  Entered by Elias Trejo MD  
  Coronary artery disease involving native coronary artery without angina pectoris  1/28/2016 - Present 1/28/2016 by Elias Trejo MD  
  Entered by Elias Trejo MD  
  Chronic fatigue  3/23/2017 - Present 3/23/2017 by Elias Trejo MD  
  Entered by Elias Trejo MD  
  Snoring  6/1/2017 - Present 6/1/2017 by Freddy Spears MD  
  Entered by Freddy Spears MD  
  Arterial stenosis ([facility]  1/10/2018 - Present 1/10/2018 by Elias Trejo MD  
  Entered by Elias Trejo MD  
  Overview Signed 1/10/2018  3:55 PM by Elias Trejo MD  
   Multiple areas. Under care of vascular doctors and cardiologist. 
  
  
  Thrombosis of left femoral artery ([facility] [address]  1/16/2018 - Present 1/16/2018 by Elisa Escobedo MD  
  Entered by Elisa Escobedo MD  
  
You are allergic to the following No active allergies Current Discharge Medication List  
  
START taking these medications Dose & Instructions Dispensing Information Comments  
 enoxaparin 120 mg/0.8 mL injection Commonly known as:  LOVENOX Dose:  120 mg  
120 mg by SubCUTAneous route every twelve (12) hours every twelve (12) hours for 5 days. Quantity:  10 Syringe Refills:  0  
   
 senna-docusate 8.6-50 mg per tablet Commonly known as:  Cecil Limb Start taking on:  3/6/2018 Notes to Patient:  Resume normal schedule Dose:  2 Tab Take 2 Tabs by mouth daily. Indications: constipation Quantity:  60 Tab Refills:  prn  
   
 traMADol 50 mg tablet Commonly known as:  ULTRAM  
 Dose:  50 mg Take 1 Tab by mouth every six (6) hours as needed for Pain. Max Daily Amount: 200 mg. Quantity:  20 Tab Refills:  0  
   
 warfarin 5 mg tablet Commonly known as:  COUMADIN Dose:  5 mg Take 1 Tab by mouth every evening. Indications: Distal thromboembolus (PVD) Quantity:  30 Tab Refills:  1 CONTINUE these medications which have CHANGED Dose & Instructions Dispensing Information Comments  
 clobetasol 0.05 % topical cream  
Commonly known as:  Christine Morales What changed:   
- when to take this 
- reasons to take this Notes to Patient:  Resume normal schedule Apply  to affected area two (2) times a day. Quantity:  60 g Refills:  2 CONTINUE these medications which have NOT CHANGED Dose & Instructions Dispensing Information Comments  
 acetaminophen-codeine 300-30 mg per tablet Commonly known as:  TYLENOL-CODEINE #3 Dose:  1 Tab Take 1 Tab by mouth every four (4) hours as needed for Pain. Max Daily Amount: 6 Tabs. Quantity:  12 Tab Refills:  0  
   
 albuterol 90 mcg/actuation inhaler Commonly known as:  PROVENTIL HFA, VENTOLIN HFA, PROAIR HFA Dose:  2 Puff Take 2 Puffs by inhalation every six (6) hours as needed for Wheezing or Shortness of Breath. Quantity:  1 Inhaler Refills:  0  
   
 amLODIPine 10 mg tablet Commonly known as:  Jillyn Boast TAKE 1 TABLET (10 MG) BY MOUTH DAILY. Refills:  11 Armodafinil 250 mg Tab tablet Commonly known as:  Marisabel Guillen Notes to Patient:  Resume normal schedule Dose:  250 mg Take 1 Tab by mouth Daily (before breakfast). Max Daily Amount: 250 mg.  
 Quantity:  30 Tab Refills:  5  
   
 aspirin delayed-release 81 mg tablet Dose:  81 mg Take 81 mg by mouth daily. Refills:  0  
   
 atorvastatin 80 mg tablet Commonly known as:  LIPITOR Dose:  80 mg Take 80 mg by mouth nightly. Refills:  0  
   
 budesonide-formoterol 160-4.5 mcg/actuation Hfaa Commonly known as:  SYMBICORT Dose:  2 Puff Take 2 Puffs by inhalation two (2) times a day. Quantity:  1 Inhaler Refills:  0  
   
 carvedilol 12.5 mg tablet Commonly known as:  Brittany Lam Dose:  12.5 mg Take 12.5 mg by mouth two (2) times a day. Refills:  0  
   
 clonazePAM 1 mg tablet Commonly known as:  Samanta Pates Dose:  1 mg Take 1 Tab by mouth daily as needed. Quantity:  30 Tab Refills:  0  
   
 furosemide 40 mg tablet Commonly known as:  LASIX TAKE 1 TABLET BY MOUTH EVERY DAY Quantity:  30 Tab Refills:  1  
   
 isosorbide mononitrate ER 60 mg CR tablet Commonly known as:  IMDUR Dose:  60 mg Take 60 mg by mouth two (2) times a day. Refills:  0  
   
 levothyroxine 25 mcg tablet Commonly known as:  SYNTHROID Dose:  25 mcg Take 1 Tab by mouth Daily (before breakfast). Quantity:  90 Tab Refills:  0  
   
 meclizine 25 mg tablet Commonly known as:  ANTIVERT Notes to Patient:  Resume normal schedule One tab three times a day as needed for vertigo Quantity:  15 Tab Refills:  0  
   
 multivitamin tablet Commonly known as:  ONE A DAY Notes to Patient:  Resume normal schedule Dose:  1 Tab Take 1 Tab by mouth daily. Stopped 1/12/18  Indications: did not hold Refills:  0  
   
 nitroglycerin 0.4 mg SL tablet Commonly known as:  NITROSTAT Dose:  0.4 mg  
1 Tab by SubLINGual route every five (5) minutes as needed. Quantity:  25 Tab Refills:  3  
   
 olmesartan 40 mg tablet Commonly known as:  [employer] Dose:  40 mg Take 1 Tab by mouth every evening. Quantity:  30 Tab Refills:  6  
   
 omeprazole 20 mg capsule Commonly known as:  PRILOSEC Notes to Patient:  Resume normal schedule TAKE 1 CAPSULE BY MOUTH DAILY. INDICATIONS: GASTROESOPHAGEAL REFLUX, HEARTBURN Quantity:  30 Cap Refills:  2  
   
 ranolazine  mg SR tablet Commonly known as:  RANEXA Dose:  500 mg Take 500 mg by mouth daily. Refills:  0 STOP taking these medications Comments EFFIENT 10 mg tablet Generic drug:  prasugrel  
   
   
 rivaroxaban 20 mg Tab tablet Commonly known as:  [employer] Current Immunizations Name Date Influenza Vaccine 11/9/2016, 11/3/2015, 11/28/2011 Influenza Vaccine (Quad) Mdck Pf 10/4/2017 Pneumococcal Conjugate (PCV-13) 8/31/2016 TD Vaccine 1/29/2001 Surgery Information ID Date/Time Status Primary Surgeon All Procedures Location [id number] 3/1/2018  7:30 AM Posted Hadley Lu MD ARTERIOGRAM LEFT LOWER EXTREMITY WITH LYSIS CATHETER PLACEMENT [facility] MAIN OR    
 [id number] 3/1/2018 11:53 AM Pablo Olvera MD LEFT LOWER EXTREMITY LYSIS RECHECK  [facility] MAIN OR    
 [de-identified] 3/2/2018  7:30 AM Posted Hadley Lu MD LEFT LOWER EXTREMITY LYSIS RECHECK  [facility] MAIN OR Follow-up Information Follow up With Details Comments Contact Info Arlene Boone MD Go on 3/13/2018 Follow up appointment 8:45am [address] [facility] [address] 
[phone] Hadley Lu MD Go on 3/21/2018 For wound re-check Wednesday 9:[address] [facility] [address] [facility] [address] 
[phone] Coumadin Clinic associated with Dr. Nunn  office Call Tomorrow and follow up if consult was recieved from Dr. Nunn  office to follow up with clinic need appointment for this week. [phone] Discharge RN spoke with Alysia Marquez the [facility] this office plan to obtain consult. [facility] Schedule an appointment as soon as possible for a visit For follow up after surgery. Discharge Instructions DISCHARGE SUMMARY from Nurse PATIENT INSTRUCTIONS: 
 
 
F-face looks uneven A-arms unable to move or move unevenly S-speech slurred or non-existent T-time-call 911 as soon as signs and symptoms begin-DO NOT go Back to bed or wait to see if you get better-TIME IS BRAIN. Warning Signs of HEART ATTACK Call 911 if you have these symptoms: 
? Chest discomfort. Most heart attacks involve discomfort in the center of the chest that lasts more than a few minutes, or that goes away and comes back. It can feel like uncomfortable pressure, squeezing, fullness, or pain. ? Discomfort in other areas of the upper body. Symptoms can include pain or discomfort in one or both arms, the back, neck, jaw, or stomach. ? Shortness of breath with or without chest discomfort. ? Other signs may include breaking out in a cold sweat, nausea, or lightheadedness. Don't wait more than five minutes to call [address]! Fast action can save your life. Calling 911 is almost always the fastest way to get lifesaving treatment. Emergency Medical Services staff can begin treatment when they arrive  up to an hour sooner than if someone gets to the hospital by car. The discharge information has been reviewed with the {PATIENT PARENT GUARDIAN:38629}. The {PATIENT PARENT GUARDIAN:92935} verbalized understanding. Discharge medications reviewed with the {[facility] meds reviewed [facility]:97516} and appropriate educational materials and side effects teaching were provided. ___________________________________________________________________________________________________________________________________ Chart Review Routing History Recipient Method Report Sent By Chrissy Somers Modesta Santos MD  
Phone: [phone] In Basket Notes Report Surya Valdez MD [[id number]] 1/8/2018 10:36 AM 1/8/2018 Suzette Vo MD  
Phone: [phone] In Basket Notes Report Surya Valdez MD [[id number]] 1/8/2018 10:36 AM 1/8/2018 Nathaly Garcia MD  
Phone: [phone] In Basket Notes Report Surya Valdez MD [[id number]] 1/8/2018 10:36 AM 1/8/2018 [facility] Fax: [phone] Fax [facility] IP AMB RESULT REPORT IMAGING Randell Speak [[id number]] 1/29/2018  4:35 PM 01/15/2018 [facility] Fax: [phone] Fax [facility] IP AMB RESULT REPORT IMAGING Randell Speak [[id number]] 1/29/2018  4:36 PM 01/02/2018

## 2018-03-01 NOTE — PROGRESS NOTES
Dual skin assessment performed with RN. Sheath to R groin C/D/I, infusions per MAR. Pt rolled side-to-side, all skin assessed. Tattoos noted. Otherwise, skin warm, dry, intact, and appropriate for ethnicity.

## 2018-03-01 NOTE — PERIOP NOTES
TRANSFER - OUT REPORT:    Verbal report given to Jan on Parnell Dubin  being transferred to CVICU for routine progression of care       Report consisted of patients Situation, Background, Assessment and   Recommendations(SBAR). Information from the following report(s) Procedure Summary was reviewed with the receiving nurse. Lines:   Peripheral IV 03/01/18 Left Antecubital (Active)   Site Assessment Clean, dry, & intact 3/1/2018 10:47 AM   Phlebitis Assessment 0 3/1/2018 10:47 AM   Infiltration Assessment 0 3/1/2018 10:47 AM   Dressing Status Clean, dry, & intact 3/1/2018 10:47 AM   Dressing Type Transparent 3/1/2018 10:47 AM   Hub Color/Line Status Green 3/1/2018 10:47 AM       Peripheral IV 03/01/18 Right Antecubital (Active)   Site Assessment Clean, dry, & intact 3/1/2018 10:47 AM   Phlebitis Assessment 0 3/1/2018 10:47 AM   Infiltration Assessment 0 3/1/2018 10:47 AM   Dressing Status Clean, dry, & intact 3/1/2018 10:47 AM   Dressing Type Transparent 3/1/2018 10:47 AM   Hub Color/Line Status Pink 3/1/2018 10:47 AM        Opportunity for questions and clarification was provided.       Patient transported with:   O2 @ 2 liters  Registered Nurse   CRNA

## 2018-03-01 NOTE — PERIOP NOTES
TRANSFER - OUT REPORT:    Verbal report given to Darell Alva (name) on Swetha Bonilla  being transferred to [facility](unit) for routine post - op       Report consisted of patients Situation, Background, Assessment and   Recommendations(SBAR). Information from the following report(s) SBAR, OR Summary, Procedure Summary and MAR was reviewed with the receiving nurse. Lines:   Peripheral IV 03/01/18 Left Antecubital (Active)   Site Assessment Clean, dry, & intact 3/1/2018 10:47 AM   Phlebitis Assessment 0 3/1/2018 10:47 AM   Infiltration Assessment 0 3/1/2018 10:47 AM   Dressing Status Clean, dry, & intact 3/1/2018 10:47 AM   Dressing Type Transparent 3/1/2018 10:47 AM   Hub Color/Line Status Green 3/1/2018 10:47 AM       Peripheral IV 03/01/18 Right Antecubital (Active)   Site Assessment Clean, dry, & intact 3/1/2018 10:47 AM   Phlebitis Assessment 0 3/1/2018 10:47 AM   Infiltration Assessment 0 3/1/2018 10:47 AM   Dressing Status Clean, dry, & intact 3/1/2018 10:47 AM   Dressing Type Transparent 3/1/2018 10:47 AM   Hub Color/Line Status Pink 3/1/2018 10:47 AM        Opportunity for questions and clarification was provided. Patient transported with:   O2 @ 3 liters    VTE prophylaxis orders have been written for Swetha Bonilla.

## 2018-03-01 NOTE — PROGRESS NOTES
TRANSFER - IN REPORT:    Verbal report received from CRNA(name) on Teri Cole  being received from OR(unit) for routine progression of care      Report consisted of patients Situation, Background, Assessment and   Recommendations(SBAR). Information from the following report(s) SBAR, OR Summary, Procedure Summary, Intake/Output, MAR, Recent Results and Med Rec Status was reviewed with the receiving nurse. Opportunity for questions and clarification was provided. Assessment completed upon patients arrival to unit and care assumed.

## 2018-03-01 NOTE — H&P
[facility]   [address], [facility] FAX: [address] Sandra Hernandez  1965    Chief Complaint   Patient presents with    Foot Pain           HPI   Ms. Lio Bacon is a 50y.o. year old female who presents to emergency department with 2 day history of worsening left leg pain. Patient has a history of peripheral vascular disease status post left SFA stent and popliteal artery stent placed back in January. Postop course was complicated by postop thrombosis of stent which required thrombolyzes and placed on long-term anticoagulation. Patient states she has been taking her Eliquis every day.   She denies any chest pain or shortness of breath    Current Facility-Administered Medications   Medication Dose Route Frequency Provider Last Rate Last Dose    heparin 25,000 units in dextrose 500 mL infusion  18-36 Units/kg/hr IntraVENous TITRATE Sydnie Frank MD        alteplase (CATHFLO) 6 mg in sterile water (preservative free) 6 mL injection  6 mg InterCATHeter ONCE Arcelia Mclaughlin MD        ceFAZolin (ANCEF) 2 g/20 mL in sterile water IV syringe  2 g IntraVENous ON CALL TO OR Arcelia Mclaughlin MD   Stopped at 03/01/18 0703     No Known Allergies  Past Medical History:   Diagnosis Date    Arthritis     CAD (coronary artery disease) CABG 2009    cabg 2009-- stent x 1--2015-- followed by dr. Olu Sy Chronic back pain states cannot lie flat due to back pain    Chronic kidney disease     stents x 2--    Chronic obstructive pulmonary disease (HCC)     daily inhaler---- no home O2    Chronic pain     Coagulation defect ([address]     DDD (degenerative disc disease), lumbar     Depression     Fibromyalgia     GERD (gastroesophageal reflux disease)     controlled with med    Hypercholesterolemia     Hypertension     controlled with med    Joint ache     Left leg pain     Liver disease     enlarged per pt    Memory impairment     MI, old x 2    2009    Narcolepsy     Obesity (BMI 30-39.9)     36.6    Persistent insomnia      PVD [facility]peripheral vascular disease) [facility])     S/P CABG x 4     Stented coronary artery     EF 55-60% echo 12/15    Thyroid disease     hypo    Vertigo      Family History   Problem Relation Age of Onset    Diabetes Mother     Heart Disease Mother     Kidney Disease Mother     Lung Disease Mother     Diabetes Father     Heart Disease Father     Kidney Disease Father     Lung Disease Father      Past Surgical History:   Procedure Laterality Date    HX  SECTION      x3    HX CHOLECYSTECTOMY      HX CORONARY ARTERY BYPASS GRAFT  2009    x 4 grafts    HX CORONARY STENT PLACEMENT  2015    x1 spring    HX HYSTERECTOMY      HX ORTHOPAEDIC      left knee arthroscopy     HX OTHER SURGICAL  2010    renal stents     HX SALPINGO-OOPHORECTOMY      VASCULAR SURGERY PROCEDURE UNLIST Left 01/15/2018    arteriogram fem and pop angio w/stenting    VASCULAR SURGERY PROCEDURE UNLIST Left 2018    arteriogram, fem pop thromectomy    VASCULAR SURGERY PROCEDURE UNLIST Left 2018    f/u lysis     Social History   Substance Use Topics    Smoking status: Former Smoker     Packs/day: 1.00     Years: 36.00     Types: Cigarettes     Quit date: 10/9/2016    Smokeless tobacco: Never Used    Alcohol use No      Comment:          Review of Systems  Constitutional: Negative for fever and chills. HENT: Negative for congestion and sore throat. Skin: Negative for rash and itching. Eyes: Negative for blurred vision and double vision. Respiratory: Negative for cough and shortness of breath. Cardiovascular: Negative for chest pain, palpitations, claudication and leg swelling. Gastrointestinal: Negative for nausea, vomiting and abdominal pain. Genitourinary: Negative for dysuria, hematuria and flank pain. Musculoskeletal: Negative for joint pain and falls.   Neurological: Negative for dizziness, sensory change, focal weakness, loss of consciousness and headaches. PHYSICAL EXAM     Vitals:    03/01/18 0559 03/01/18 0650   BP: 143/89    BP 1 Location: Left arm Left arm   BP Patient Position:  At rest   Pulse: 72 83   Resp: 16 18   Temp: 98 °F (36.7 °C) 98.3 °F (36.8 °C)   SpO2: 96% 99%   Weight: 215 lb (97.5 kg)    Height: 5' 7\" (1.702 m)         Constitutional: she appears well-developed. No distress. HENT: Hearing intact. Head: Atraumatic. Eyes: Pupils are equal, round, and reactive to light. Neck: Normal range of motion. Cardiovascular: Regular rhythm. Pulmonary/Chest: Effort normal and breath sounds normal. No respiratory distress. Abdominal: Soft. Bowel sounds are normal. she exhibits no distension. There is no tenderness. There is no guarding. No hernia. Musculoskeletal: Normal range of motion. Neurological: She is alert. CN II- XII grossly intact  Skin: Warm and dry. Vascular: Palpable femoral pulses, nonpalpable pedal pulses right Doppler posterior tibial signal in the right left no Doppler signal motor and sensory intact no open ulcerations      Imaging Results  CTA on prelim showed no evidence of any aortic iliac disease, proximal right SFA is occluded with reconstitutes briefly at the below-knee pop with occlusion of the popliteal artery stent with reconstitution of the tibioperoneal trunk with anterior and posterior tibial runoff to the ankle    ASSESSMENT AND PLAN   Ms. Jonn Thakkar is a 50y.o. year old female with occlusion of left SFA and popliteal artery stent with worsening left left leg pain. Patient currently on heparin will plan for left leg arteriogram possible thrombolyzes. Discussed with the patient the risks and benefits of the procedure. Patient been marked and consented    Elements of this note have been dictated using speech recognition software. As a result, errors of speech recognition may have occurred.

## 2018-03-01 NOTE — ED NOTES
I have reviewed discharge instructions with the patient. The patient verbalized understanding. Patient left ED via Discharge Method: ambulatory to Home with (family). Opportunity for questions and clarification provided. Patient given 0 scripts. To continue your aftercare when you leave the hospital, you may receive an automated call from our care team to check in on how you are doing. This is a free service and part of our promise to provide the best care and service to meet your aftercare needs.  If you have questions, or wish to unsubscribe from this service please call [phone]. Thank you for Choosing our [facility] Emergency Department.

## 2018-03-01 NOTE — ED NOTES
TRANSFER - OUT REPORT:    Verbal report given to Mateo Myrick RN(name) on Dolph Flight  being transferred to ER(unit) for urgent transfer       Report consisted of patients Situation, Background, Assessment and   Recommendations(SBAR). Information from the following report(s) SBAR, Kardex, ED Summary, MAR, Accordion, Recent Results and Med Rec Status was reviewed with the receiving nurse. Lines:       Opportunity for questions and clarification was provided.       Patient transported with:   revoPT

## 2018-03-01 NOTE — PERIOP NOTES
Patient has belongings locked up with security. These items included 1 bracelet, 1 set of myranda earrings, 1 cellphone, dental work and 1 set of glasses.

## 2018-03-01 NOTE — OP NOTES
[address] [facility]  MR#: [id number]  : 1965  ACCOUNT #: [de-identified]   DATE OF SERVICE: 2018    CLINICAL SERVICE: Vascular Surgery. PREOPERATIVE DIAGNOSIS: Status post left ischemic leg with thrombolysis catheter placement. POSTOPERATIVE DIAGNOSIS:  Status post left ischemic leg with thrombolysis catheter placement. SURGICAL PROCEDURE: Follow up thrombolysis catheter check to the left lower extremity arteriogram.      SURGEON: John Winters. Libby Logan MD    ANESTHESIA:  General.    ESTIMATED BLOOD LOSS:     SPECIMENS REMOVED:     COMPLICATIONS:  None. IMPLANTS:     FINDINGS: The SFA and popliteal artery stents were patent still with mild intraluminal thrombus, the below knee popliteal artery was patent with patent anterior tibial and posterior tibial dominant runoff down to the ankle. PROCEDURE IN DETAIL:  After giving informed consent, the patient was brought back to the operating room. Anesthesia was then induced. Preop antibiotics were given. The patient's right groin was then prepped and draped in the normal sterile fashion. We then exchanged the catheter over a 0.018 wire, which was placed down the peroneal artery. We did a follow up sheathogram, which showed the SFA and popliteal artery. The stents were patent, however, still had some in-stent thrombus. The posterior tibioperoneal trunk was patent with anterior tibial and posterior tibial artery being patent. The peroneal also being patent with an anterior tibial artery and the posterior tibial providing collateral flow through the plantar vessels. We then replaced the TPA multilevel catheter in the distal below knee pop extending up into the SFA artery. Will be on thrombolysis overnight.       MD Mariza Liriano / Naif.Favorite  D: 2018 13:55     T: 2018 15:16  JOB #: [id number]

## 2018-03-01 NOTE — PERIOP NOTES
1110- Report from [place], RN for lunch relief. [address] [facility] Posey at bedside to evaluate patient. No orders received at this time. 11:55 AM  Report back to [place], RN.

## 2018-03-01 NOTE — ANESTHESIA PREPROCEDURE EVALUATION
Anesthetic History               Review of Systems / Medical History  Patient summary reviewed, nursing notes reviewed and pertinent labs reviewed    Pulmonary    COPD: mild      Undiagnosed apnea and smoker         Neuro/Psych         Psychiatric history     Cardiovascular    Hypertension: well controlled          Past MI (2009), CAD, PAD, cardiac stents (4/15), CABG (2009) and hyperlipidemia    Exercise tolerance: <4 METS  Comments: Stent in 4/2015    Patient is a poor historian, she reports a vague history of chestpain and or shortness of breath that seem to be longstanding, she cannot describe her symptoms, she says she sometimes gets short of breath upon walking across the room, by the end of the interview I felt the patient was very suggestable. She denies current chest pain, shortness of breath or changes in her activity in the past 3 months, however she reports leg pain limiting her activity since December. GI/Hepatic/Renal     GERD: well controlled           Endo/Other      Hypothyroidism: well controlled  Obesity and arthritis     Other Findings            Physical Exam    Airway  Mallampati: II  TM Distance: 4 - 6 cm  Neck ROM: normal range of motion   Mouth opening: Normal     Cardiovascular    Rhythm: regular  Rate: normal    Murmur: Grade 2, Aortic area  Pertinent negatives: No peripheral edema   Dental    Dentition: Full upper dentures, Full lower dentures and Edentulous     Pulmonary  Breath sounds clear to auscultation               Abdominal  GI exam deferred       Other Findings            Anesthetic Plan    ASA: 3  Anesthesia type: total IV anesthesia and general - backup          Induction: Intravenous  Anesthetic plan and risks discussed with: Patient      Pt tolerated GA with LMA this am. She is returning to OR for additional lysis. Will plan GA with LMA unless she easily tolerates TIVA.

## 2018-03-02 ENCOUNTER — APPOINTMENT (OUTPATIENT)
Dept: INTERVENTIONAL RADIOLOGY/VASCULAR | Age: 53
DRG: 316 | End: 2018-03-02
Attending: SURGERY
Payer: MEDICARE

## 2018-03-02 ENCOUNTER — ANESTHESIA (OUTPATIENT)
Dept: SURGERY | Age: 53
DRG: 316 | End: 2018-03-02
Payer: MEDICARE

## 2018-03-02 PROBLEM — F17.200 TOBACCO USE DISORDER: Status: ACTIVE | Noted: 2018-03-02

## 2018-03-02 LAB
ACT BLD: 125 SECS (ref 70–128)
ANION GAP SERPL CALC-SCNC: 5 MMOL/L (ref 7–16)
APTT PPP: 28.7 SEC (ref 23.2–35.3)
BUN SERPL-MCNC: 9 MG/DL (ref 6–23)
CALCIUM SERPL-MCNC: 7.8 MG/DL (ref 8.3–10.4)
CHLORIDE SERPL-SCNC: 107 MMOL/L (ref 98–107)
CO2 SERPL-SCNC: 29 MMOL/L (ref 21–32)
CREAT SERPL-MCNC: 0.73 MG/DL (ref 0.6–1)
ERYTHROCYTE [DISTWIDTH] IN BLOOD BY AUTOMATED COUNT: 14.1 % (ref 11.9–14.6)
FIBRINOGEN PPP-MCNC: 348 MG/DL (ref 190–501)
GLUCOSE BLD STRIP.AUTO-MCNC: 119 MG/DL (ref 65–100)
GLUCOSE SERPL-MCNC: 105 MG/DL (ref 65–100)
HCT VFR BLD AUTO: 36.5 % (ref 35.8–46.3)
HGB BLD-MCNC: 12.1 G/DL (ref 11.7–15.4)
INR PPP: 1.1
MCH RBC QN AUTO: 30.1 PG (ref 26.1–32.9)
MCHC RBC AUTO-ENTMCNC: 33.2 G/DL (ref 31.4–35)
MCV RBC AUTO: 90.8 FL (ref 79.6–97.8)
PLATELET # BLD AUTO: 191 K/UL (ref 150–450)
PMV BLD AUTO: 10.4 FL (ref 10.8–14.1)
POTASSIUM SERPL-SCNC: 3.8 MMOL/L (ref 3.5–5.1)
PROTHROMBIN TIME: 13.4 SEC (ref 11.5–14.5)
RBC # BLD AUTO: 4.02 M/UL (ref 4.05–5.25)
SODIUM SERPL-SCNC: 141 MMOL/L (ref 136–145)
WBC # BLD AUTO: 11.4 K/UL (ref 4.3–11.1)

## 2018-03-02 PROCEDURE — C1769 GUIDE WIRE: HCPCS

## 2018-03-02 PROCEDURE — 74011250636 HC RX REV CODE- 250/636

## 2018-03-02 PROCEDURE — 74011250636 HC RX REV CODE- 250/636: Performed by: SURGERY

## 2018-03-02 PROCEDURE — 94760 N-INVAS EAR/PLS OXIMETRY 1: CPT

## 2018-03-02 PROCEDURE — 80048 BASIC METABOLIC PNL TOTAL CA: CPT | Performed by: SURGERY

## 2018-03-02 PROCEDURE — 76060000032 HC ANESTHESIA 0.5 TO 1 HR: Performed by: SURGERY

## 2018-03-02 PROCEDURE — 77030020143 HC AIRWY LARYN INTUB CGAS -A: Performed by: ANESTHESIOLOGY

## 2018-03-02 PROCEDURE — 74011000250 HC RX REV CODE- 250

## 2018-03-02 PROCEDURE — 94640 AIRWAY INHALATION TREATMENT: CPT

## 2018-03-02 PROCEDURE — 74011636320 HC RX REV CODE- 636/320: Performed by: SURGERY

## 2018-03-02 PROCEDURE — 85027 COMPLETE CBC AUTOMATED: CPT | Performed by: NURSE PRACTITIONER

## 2018-03-02 PROCEDURE — 85347 COAGULATION TIME ACTIVATED: CPT

## 2018-03-02 PROCEDURE — C1760 CLOSURE DEV, VASC: HCPCS

## 2018-03-02 PROCEDURE — 37214 CESSJ THERAPY CATH REMOVAL: CPT

## 2018-03-02 PROCEDURE — 85384 FIBRINOGEN ACTIVITY: CPT | Performed by: NURSE PRACTITIONER

## 2018-03-02 PROCEDURE — 74011000250 HC RX REV CODE- 250: Performed by: SURGERY

## 2018-03-02 PROCEDURE — 76010000138 HC OR TIME 0.5 TO 1 HR: Performed by: SURGERY

## 2018-03-02 PROCEDURE — 85610 PROTHROMBIN TIME: CPT | Performed by: NURSE PRACTITIONER

## 2018-03-02 PROCEDURE — C1887 CATHETER, GUIDING: HCPCS

## 2018-03-02 PROCEDURE — 74011250637 HC RX REV CODE- 250/637: Performed by: SURGERY

## 2018-03-02 PROCEDURE — C8929 TTE W OR WO FOL WCON,DOPPLER: HCPCS

## 2018-03-02 PROCEDURE — 74011250637 HC RX REV CODE- 250/637: Performed by: ANESTHESIOLOGY

## 2018-03-02 PROCEDURE — 82962 GLUCOSE BLOOD TEST: CPT

## 2018-03-02 PROCEDURE — 65660000004 HC RM CVT STEPDOWN

## 2018-03-02 PROCEDURE — 85730 THROMBOPLASTIN TIME PARTIAL: CPT | Performed by: NURSE PRACTITIONER

## 2018-03-02 PROCEDURE — B41G1ZZ FLUOROSCOPY OF LEFT LOWER EXTREMITY ARTERIES USING LOW OSMOLAR CONTRAST: ICD-10-PCS | Performed by: SURGERY

## 2018-03-02 PROCEDURE — 77010033678 HC OXYGEN DAILY

## 2018-03-02 RX ORDER — SODIUM CHLORIDE, SODIUM LACTATE, POTASSIUM CHLORIDE, CALCIUM CHLORIDE 600; 310; 30; 20 MG/100ML; MG/100ML; MG/100ML; MG/100ML
100 INJECTION, SOLUTION INTRAVENOUS CONTINUOUS
Status: DISCONTINUED | OUTPATIENT
Start: 2018-03-02 | End: 2018-03-02

## 2018-03-02 RX ORDER — SODIUM CHLORIDE 0.9 % (FLUSH) 0.9 %
5-10 SYRINGE (ML) INJECTION AS NEEDED
Status: DISCONTINUED | OUTPATIENT
Start: 2018-03-02 | End: 2018-03-02

## 2018-03-02 RX ORDER — ATORVASTATIN CALCIUM 40 MG/1
80 TABLET, FILM COATED ORAL
Status: DISCONTINUED | OUTPATIENT
Start: 2018-03-02 | End: 2018-03-05 | Stop reason: HOSPADM

## 2018-03-02 RX ORDER — ENOXAPARIN SODIUM 150 MG/ML
110 INJECTION SUBCUTANEOUS EVERY 12 HOURS
Status: DISCONTINUED | OUTPATIENT
Start: 2018-03-02 | End: 2018-03-05 | Stop reason: HOSPADM

## 2018-03-02 RX ORDER — OXYCODONE HYDROCHLORIDE 5 MG/1
5 TABLET ORAL
Status: DISCONTINUED | OUTPATIENT
Start: 2018-03-02 | End: 2018-03-05 | Stop reason: HOSPADM

## 2018-03-02 RX ORDER — OXYCODONE AND ACETAMINOPHEN 10; 325 MG/1; MG/1
1 TABLET ORAL AS NEEDED
Status: DISCONTINUED | OUTPATIENT
Start: 2018-03-02 | End: 2018-03-05 | Stop reason: HOSPADM

## 2018-03-02 RX ORDER — ASPIRIN 81 MG/1
81 TABLET ORAL DAILY
Status: DISCONTINUED | OUTPATIENT
Start: 2018-03-02 | End: 2018-03-02

## 2018-03-02 RX ORDER — LEVOTHYROXINE SODIUM 50 UG/1
25 TABLET ORAL
Status: DISCONTINUED | OUTPATIENT
Start: 2018-03-02 | End: 2018-03-05 | Stop reason: HOSPADM

## 2018-03-02 RX ORDER — LIDOCAINE HYDROCHLORIDE 10 MG/ML
0.1 INJECTION INFILTRATION; PERINEURAL AS NEEDED
Status: DISCONTINUED | OUTPATIENT
Start: 2018-03-02 | End: 2018-03-05 | Stop reason: HOSPADM

## 2018-03-02 RX ORDER — FENTANYL CITRATE 50 UG/ML
25 INJECTION, SOLUTION INTRAMUSCULAR; INTRAVENOUS ONCE
Status: DISCONTINUED | OUTPATIENT
Start: 2018-03-02 | End: 2018-03-02

## 2018-03-02 RX ORDER — SODIUM CHLORIDE, SODIUM LACTATE, POTASSIUM CHLORIDE, CALCIUM CHLORIDE 600; 310; 30; 20 MG/100ML; MG/100ML; MG/100ML; MG/100ML
75 INJECTION, SOLUTION INTRAVENOUS CONTINUOUS
Status: DISCONTINUED | OUTPATIENT
Start: 2018-03-02 | End: 2018-03-02

## 2018-03-02 RX ORDER — WARFARIN SODIUM 5 MG/1
10 TABLET ORAL EVERY EVENING
Status: DISCONTINUED | OUTPATIENT
Start: 2018-03-02 | End: 2018-03-04

## 2018-03-02 RX ORDER — OLMESARTAN MEDOXOMIL 40 MG/1
40 TABLET ORAL EVERY EVENING
Status: DISCONTINUED | OUTPATIENT
Start: 2018-03-02 | End: 2018-03-05 | Stop reason: HOSPADM

## 2018-03-02 RX ORDER — MIDAZOLAM HYDROCHLORIDE 1 MG/ML
2 INJECTION, SOLUTION INTRAMUSCULAR; INTRAVENOUS
Status: DISCONTINUED | OUTPATIENT
Start: 2018-03-02 | End: 2018-03-05 | Stop reason: HOSPADM

## 2018-03-02 RX ORDER — FAMOTIDINE 20 MG/1
20 TABLET, FILM COATED ORAL ONCE
Status: COMPLETED | OUTPATIENT
Start: 2018-03-02 | End: 2018-03-02

## 2018-03-02 RX ORDER — ISOSORBIDE MONONITRATE 30 MG/1
60 TABLET, EXTENDED RELEASE ORAL DAILY
Status: DISCONTINUED | OUTPATIENT
Start: 2018-03-02 | End: 2018-03-05 | Stop reason: HOSPADM

## 2018-03-02 RX ORDER — ACETAMINOPHEN 325 MG/1
650 TABLET ORAL
Status: DISCONTINUED | OUTPATIENT
Start: 2018-03-02 | End: 2018-03-05 | Stop reason: HOSPADM

## 2018-03-02 RX ORDER — HYDROMORPHONE HYDROCHLORIDE 2 MG/ML
0.5 INJECTION, SOLUTION INTRAMUSCULAR; INTRAVENOUS; SUBCUTANEOUS
Status: DISCONTINUED | OUTPATIENT
Start: 2018-03-02 | End: 2018-03-05 | Stop reason: HOSPADM

## 2018-03-02 RX ORDER — ONDANSETRON 2 MG/ML
INJECTION INTRAMUSCULAR; INTRAVENOUS AS NEEDED
Status: DISCONTINUED | OUTPATIENT
Start: 2018-03-02 | End: 2018-03-02 | Stop reason: HOSPADM

## 2018-03-02 RX ORDER — MIDAZOLAM HYDROCHLORIDE 1 MG/ML
2 INJECTION, SOLUTION INTRAMUSCULAR; INTRAVENOUS ONCE
Status: DISCONTINUED | OUTPATIENT
Start: 2018-03-02 | End: 2018-03-02

## 2018-03-02 RX ORDER — ACETAMINOPHEN AND CODEINE PHOSPHATE 300; 30 MG/1; MG/1
1 TABLET ORAL
Status: DISCONTINUED | OUTPATIENT
Start: 2018-03-02 | End: 2018-03-05 | Stop reason: HOSPADM

## 2018-03-02 RX ORDER — WARFARIN 7.5 MG/1
7.5 TABLET ORAL ONCE
Status: DISCONTINUED | OUTPATIENT
Start: 2018-03-02 | End: 2018-03-02

## 2018-03-02 RX ORDER — PROPOFOL 10 MG/ML
INJECTION, EMULSION INTRAVENOUS AS NEEDED
Status: DISCONTINUED | OUTPATIENT
Start: 2018-03-02 | End: 2018-03-02 | Stop reason: HOSPADM

## 2018-03-02 RX ORDER — SODIUM CHLORIDE 9 MG/ML
50 INJECTION, SOLUTION INTRAVENOUS CONTINUOUS
Status: DISCONTINUED | OUTPATIENT
Start: 2018-03-02 | End: 2018-03-02

## 2018-03-02 RX ORDER — SODIUM CHLORIDE 0.9 % (FLUSH) 0.9 %
5-10 SYRINGE (ML) INJECTION EVERY 8 HOURS
Status: DISCONTINUED | OUTPATIENT
Start: 2018-03-02 | End: 2018-03-02

## 2018-03-02 RX ORDER — ISOSORBIDE MONONITRATE 60 MG/1
60 TABLET, EXTENDED RELEASE ORAL 2 TIMES DAILY
Status: DISCONTINUED | OUTPATIENT
Start: 2018-03-02 | End: 2018-03-02

## 2018-03-02 RX ORDER — NITROGLYCERIN 0.4 MG/1
0.4 TABLET SUBLINGUAL
Status: DISCONTINUED | OUTPATIENT
Start: 2018-03-02 | End: 2018-03-05 | Stop reason: HOSPADM

## 2018-03-02 RX ORDER — SODIUM CHLORIDE, SODIUM LACTATE, POTASSIUM CHLORIDE, CALCIUM CHLORIDE 600; 310; 30; 20 MG/100ML; MG/100ML; MG/100ML; MG/100ML
INJECTION, SOLUTION INTRAVENOUS
Status: DISCONTINUED | OUTPATIENT
Start: 2018-03-02 | End: 2018-03-02 | Stop reason: HOSPADM

## 2018-03-02 RX ORDER — CARVEDILOL 12.5 MG/1
12.5 TABLET ORAL 2 TIMES DAILY WITH MEALS
Status: DISCONTINUED | OUTPATIENT
Start: 2018-03-02 | End: 2018-03-05 | Stop reason: HOSPADM

## 2018-03-02 RX ORDER — LIDOCAINE HYDROCHLORIDE 20 MG/ML
INJECTION, SOLUTION EPIDURAL; INFILTRATION; INTRACAUDAL; PERINEURAL AS NEEDED
Status: DISCONTINUED | OUTPATIENT
Start: 2018-03-02 | End: 2018-03-02 | Stop reason: HOSPADM

## 2018-03-02 RX ORDER — ACETAMINOPHEN 10 MG/ML
INJECTION, SOLUTION INTRAVENOUS AS NEEDED
Status: DISCONTINUED | OUTPATIENT
Start: 2018-03-02 | End: 2018-03-02 | Stop reason: HOSPADM

## 2018-03-02 RX ORDER — HEPARIN SODIUM 5000 [USP'U]/ML
INJECTION, SOLUTION INTRAVENOUS; SUBCUTANEOUS AS NEEDED
Status: DISCONTINUED | OUTPATIENT
Start: 2018-03-02 | End: 2018-03-02 | Stop reason: HOSPADM

## 2018-03-02 RX ORDER — PRASUGREL 10 MG/1
10 TABLET, FILM COATED ORAL DAILY
Status: DISCONTINUED | OUTPATIENT
Start: 2018-03-02 | End: 2018-03-05 | Stop reason: HOSPADM

## 2018-03-02 RX ORDER — RANOLAZINE 500 MG/1
500 TABLET, EXTENDED RELEASE ORAL DAILY
Status: DISCONTINUED | OUTPATIENT
Start: 2018-03-02 | End: 2018-03-05 | Stop reason: HOSPADM

## 2018-03-02 RX ORDER — FUROSEMIDE 40 MG/1
40 TABLET ORAL DAILY
Status: DISCONTINUED | OUTPATIENT
Start: 2018-03-02 | End: 2018-03-05 | Stop reason: HOSPADM

## 2018-03-02 RX ORDER — HYDROMORPHONE HYDROCHLORIDE 2 MG/ML
INJECTION, SOLUTION INTRAMUSCULAR; INTRAVENOUS; SUBCUTANEOUS AS NEEDED
Status: DISCONTINUED | OUTPATIENT
Start: 2018-03-02 | End: 2018-03-02 | Stop reason: HOSPADM

## 2018-03-02 RX ADMIN — CARVEDILOL 12.5 MG: 12.5 TABLET, FILM COATED ORAL at 17:46

## 2018-03-02 RX ADMIN — PROPOFOL 200 MG: 10 INJECTION, EMULSION INTRAVENOUS at 07:24

## 2018-03-02 RX ADMIN — OLMESARTAN MEDOXOMIL 40 MG: 40 TABLET, FILM COATED ORAL at 20:49

## 2018-03-02 RX ADMIN — ENOXAPARIN SODIUM 110 MG: 120 INJECTION SUBCUTANEOUS at 11:04

## 2018-03-02 RX ADMIN — WARFARIN SODIUM 10 MG: 5 TABLET ORAL at 17:47

## 2018-03-02 RX ADMIN — FUROSEMIDE 40 MG: 40 TABLET ORAL at 10:27

## 2018-03-02 RX ADMIN — Medication 2 G: at 05:39

## 2018-03-02 RX ADMIN — ALBUTEROL SULFATE 2 PUFF: 90 AEROSOL, METERED RESPIRATORY (INHALATION) at 08:43

## 2018-03-02 RX ADMIN — SODIUM CHLORIDE, SODIUM LACTATE, POTASSIUM CHLORIDE, CALCIUM CHLORIDE: 600; 310; 30; 20 INJECTION, SOLUTION INTRAVENOUS at 07:20

## 2018-03-02 RX ADMIN — ATORVASTATIN CALCIUM 80 MG: 40 TABLET, FILM COATED ORAL at 20:39

## 2018-03-02 RX ADMIN — RANOLAZINE 500 MG: 500 TABLET, FILM COATED, EXTENDED RELEASE ORAL at 10:26

## 2018-03-02 RX ADMIN — WATER 4 MG: 1 INJECTION INTRAMUSCULAR; INTRAVENOUS; SUBCUTANEOUS at 07:44

## 2018-03-02 RX ADMIN — ONDANSETRON 4 MG: 2 INJECTION INTRAMUSCULAR; INTRAVENOUS at 07:51

## 2018-03-02 RX ADMIN — SODIUM CHLORIDE 100 ML/HR: 900 INJECTION, SOLUTION INTRAVENOUS at 00:21

## 2018-03-02 RX ADMIN — SODIUM CHLORIDE 100 ML/HR: 900 INJECTION, SOLUTION INTRAVENOUS at 08:15

## 2018-03-02 RX ADMIN — MORPHINE SULFATE 2 MG: 2 INJECTION, SOLUTION INTRAMUSCULAR; INTRAVENOUS at 05:43

## 2018-03-02 RX ADMIN — MORPHINE SULFATE 2 MG: 2 INJECTION, SOLUTION INTRAMUSCULAR; INTRAVENOUS at 00:16

## 2018-03-02 RX ADMIN — ALBUTEROL SULFATE 2 PUFF: 90 AEROSOL, METERED RESPIRATORY (INHALATION) at 21:07

## 2018-03-02 RX ADMIN — Medication 10 ML: at 05:39

## 2018-03-02 RX ADMIN — Medication 10 ML: at 17:52

## 2018-03-02 RX ADMIN — CARVEDILOL 12.5 MG: 12.5 TABLET, FILM COATED ORAL at 10:26

## 2018-03-02 RX ADMIN — ACETAMINOPHEN 650 MG: 325 TABLET ORAL at 21:46

## 2018-03-02 RX ADMIN — ISOSORBIDE MONONITRATE 60 MG: 60 TABLET, EXTENDED RELEASE ORAL at 10:26

## 2018-03-02 RX ADMIN — LEVOTHYROXINE SODIUM 25 MCG: 50 TABLET ORAL at 10:26

## 2018-03-02 RX ADMIN — Medication 10 ML: at 21:00

## 2018-03-02 RX ADMIN — ACETAMINOPHEN 1000 MG: 10 INJECTION, SOLUTION INTRAVENOUS at 07:38

## 2018-03-02 RX ADMIN — HYDROMORPHONE HYDROCHLORIDE 1 MG: 2 INJECTION, SOLUTION INTRAMUSCULAR; INTRAVENOUS; SUBCUTANEOUS at 07:49

## 2018-03-02 RX ADMIN — ENOXAPARIN SODIUM 110 MG: 120 INJECTION SUBCUTANEOUS at 20:48

## 2018-03-02 RX ADMIN — LIDOCAINE HYDROCHLORIDE 100 MG: 20 INJECTION, SOLUTION EPIDURAL; INFILTRATION; INTRACAUDAL; PERINEURAL at 07:24

## 2018-03-02 RX ADMIN — FAMOTIDINE 20 MG: 20 TABLET, FILM COATED ORAL at 10:26

## 2018-03-02 RX ADMIN — PRASUGREL HYDROCHLORIDE 10 MG: 10 TABLET, FILM COATED ORAL at 10:26

## 2018-03-02 RX ADMIN — MORPHINE SULFATE 2 MG: 2 INJECTION, SOLUTION INTRAMUSCULAR; INTRAVENOUS at 09:47

## 2018-03-02 RX ADMIN — MORPHINE SULFATE 2 MG: 2 INJECTION, SOLUTION INTRAMUSCULAR; INTRAVENOUS at 17:54

## 2018-03-02 RX ADMIN — MORPHINE SULFATE 2 MG: 2 INJECTION, SOLUTION INTRAMUSCULAR; INTRAVENOUS at 02:31

## 2018-03-02 RX ADMIN — ASPIRIN 81 MG 81 MG: 81 TABLET ORAL at 10:27

## 2018-03-02 RX ADMIN — MORPHINE SULFATE 2 MG: 2 INJECTION, SOLUTION INTRAMUSCULAR; INTRAVENOUS at 22:31

## 2018-03-02 RX ADMIN — PERFLUTREN 1 ML: 6.52 INJECTION, SUSPENSION INTRAVENOUS at 10:00

## 2018-03-02 NOTE — PROGRESS NOTES
Warfarin dosing per pharmacist    Lucy Hanna is a 50 y.o. female. Height: 5' 7\" (170.2 cm)    Weight: 107.6 kg (237 lb 3.4 oz)    Indication:  Distal thromboembolus    Goal INR:  2-3    Home dose:  10 mg alternating with 15 mg (per PTA med list 2015)    Risk factors or significant drug interactions:  lipitor    Other anticoagulants:  Lovenox bridge    Daily Monitoring  Date  INR     Warfarin dose HGB              Notes  3/2  1.1  10 mg  12.1      Pharmacy consulted to dose warfarin 3/2/18. Patient with significant h/o PVD s/p LLE thrombolysis for occluded SFA and popliteal stents. Most recently on Eliquis now changing to warfarin. Per prescription history patient filled the 10 mg tablets back in 2015 and was taking 10 mg alternating with 15 mg. Will start back with 10 mg daily currently. Lovenox bridge. Will follow daily INR. Thank you,  Anthony Tubbs, Pharm. D.   Clinical Pharmacist  [phone]

## 2018-03-02 NOTE — BRIEF OP NOTE
[address]   [address] FAX: [phone]    Brief Op Note Template Note    Pre-Op Diagnosis: S/P COLD FOOD    Post-Op Diagnosis:  S/P COLD FOOD    Procedures: Procedure(s):  LEFT LOWER EXTREMITY LYSIS RECHECK     Surgeon: Rakel Perez MD    Assistants: Surgeon(s):  Rakel Perez MD      Anesthesia:  MAC     Findings: Left SFA and popliteal artery stent patent, anterior tibial peroneal patent,  posterior tibial still thrombus     Tourniquet Time:  * No tourniquets in log *    Estimated Blood Loss:               Specimens: None           Implants:  * No implants in log *    Complications: None               Signed: Rakel Perez MD      Elements of this note have been dictated using speech recognition software. As a result, errors of speech recognition may have occurred.

## 2018-03-02 NOTE — ANESTHESIA POSTPROCEDURE EVALUATION
Post-Anesthesia Evaluation and Assessment    Patient: Lio Bacon MRN: [id number]  SSN: [id number]    YOB: 1965  Age: 50 y.o. Sex: female       Cardiovascular Function/Vital Signs  Visit Vitals    /73    Pulse 75    Temp 36.8 °C (98.2 °F)    Resp 23    Ht 5' 7\" (1.702 m)    Wt 107.6 kg (237 lb 3.4 oz)    SpO2 98%    BMI 37.15 kg/m2       Patient is status post general anesthesia for Procedure(s):  LEFT LOWER EXTREMITY LYSIS RECHECK . Nausea/Vomiting: None    Postoperative hydration reviewed and adequate. Pain:  Pain Scale 1: Visual (03/02/18 0816)  Pain Intensity 1: 0 (03/02/18 0816)   Managed    Neurological Status:   Neuro (WDL): Within Defined Limits (03/01/18 1047)  Neuro  Neurologic State: Drowsy; Eyes open spontaneously (03/02/18 5869)  Orientation Level: Oriented X4 (03/02/18 0816)  Cognition: Appropriate decision making; Appropriate for age attention/concentration; Appropriate safety awareness; Follows commands (03/02/18 0816)  Speech: Clear (03/02/18 0816)  Assessment L Pupil: Brisk;Round (03/02/18 0816)  Size L Pupil (mm): 3 (03/02/18 0816)  Assessment R Pupil: Brisk;Round (03/02/18 0816)  Size R Pupil (mm): 3 (03/02/18 0816)  LUE Motor Response: Purposeful;Spontaneous  (03/02/18 0816)  LLE Motor Response: Purposeful;Spontaneous  (03/02/18 0816)  RUE Motor Response: Purposeful;Spontaneous  (03/02/18 0816)  RLE Motor Response: Purposeful;Spontaneous  (03/02/18 0816)   At baseline    Mental Status and Level of Consciousness: Arousable    Pulmonary Status:   O2 Device: Nasal cannula (03/02/18 0843)   Adequate oxygenation and airway patent    Complications related to anesthesia: None    Post-anesthesia assessment completed.  No concerns    Signed By: Leonardo Metzger MD     March 2, 2018

## 2018-03-02 NOTE — PROGRESS NOTES
TRANSFER - OUT REPORT:    Verbal [facility] given to SIMON [facility](unit) for routine progression of care       Report consisted of patients Situation, Background, Assessment and   Recommendations(SBAR). Information from the following report(s) SBAR, OR Summary, Procedure Summary, Intake/Output and Recent Results was reviewed with the receiving nurse. Lines:   Peripheral IV 03/01/18 Left Antecubital (Active)   Site Assessment Clean, dry, & intact 3/2/2018 11:00 AM   Phlebitis Assessment 0 3/2/2018 11:00 AM   Infiltration Assessment 0 3/2/2018 11:00 AM   Dressing Status Clean;Occlusive; Old drainage 3/2/2018 11:00 AM   Dressing Type Tape;Transparent 3/2/2018 11:00 AM   Hub Color/Line Status Green;Capped;Flushed;Patent 3/2/2018 11:00 AM   Action Taken Open ports on tubing capped 3/1/2018  7:03 PM   Alcohol Cap Used No 3/2/2018 11:00 AM       Peripheral IV 03/01/18 Right Antecubital (Active)   Site Assessment Clean, dry, & intact 3/2/2018 11:00 AM   Phlebitis Assessment 0 3/2/2018 11:00 AM   Infiltration Assessment 0 3/2/2018 11:00 AM   Dressing Status Clean, dry, & intact; Occlusive 3/2/2018 11:00 AM   Dressing Type Tape;Transparent 3/2/2018 11:00 AM   Hub Color/Line Status Pink;Capped;Flushed;Patent 3/2/2018 11:00 AM   Action Taken Open ports on tubing capped 3/1/2018  7:03 PM   Alcohol Cap Used No 3/2/2018 11:00 AM        Opportunity for questions and clarification was provided.       Patient transported with:   Monitor  Registered Nurse

## 2018-03-02 NOTE — PROGRESS NOTES
Patient continues to complain of chronic lower back pain. States the prn medication \"isn't really helping\". Patient repositioned. Heating pack applied to lower back.   Will monitor closely

## 2018-03-02 NOTE — ANESTHESIA PREPROCEDURE EVALUATION
Anesthetic History   No history of anesthetic complications            Review of Systems / Medical History  Patient summary reviewed, nursing notes reviewed and pertinent labs reviewed    Pulmonary    COPD: mild      Undiagnosed apnea and smoker         Neuro/Psych         Psychiatric history     Cardiovascular    Hypertension: well controlled          Past MI, CAD, PAD, cardiac stents, CABG (x 4) and hyperlipidemia    Exercise tolerance: <4 METS  Comments: EF 55%   GI/Hepatic/Renal     GERD: well controlled    Renal disease: CRI       Endo/Other      Hypothyroidism: well controlled  Obesity, morbid obesity and arthritis     Other Findings   Comments: Chronic pain  Depression  Insomnia  LBP  DDD  Fibromyalgia  vertigo     Anesthetic History               Review of Systems / Medical History  Patient summary reviewed, nursing notes reviewed and pertinent labs reviewed    Pulmonary    COPD: mild      Undiagnosed apnea and smoker         Neuro/Psych         Psychiatric history     Cardiovascular    Hypertension: well controlled          Past MI (2009), CAD, PAD, cardiac stents (4/15), CABG (2009) and hyperlipidemia    Exercise tolerance: <4 METS  Comments: Stent in 4/2015    Patient is a poor historian, she reports a vague history of chestpain and or shortness of breath that seem to be longstanding, she cannot describe her symptoms, she says she sometimes gets short of breath upon walking across the room, by the end of the interview I felt the patient was very suggestable. She denies current chest pain, shortness of breath or changes in her activity in the past 3 months, however she reports leg pain limiting her activity since December.    GI/Hepatic/Renal     GERD: well controlled           Endo/Other      Hypothyroidism: well controlled  Obesity and arthritis     Other Findings            Physical Exam    Airway  Mallampati: II  TM Distance: 4 - 6 cm  Neck ROM: normal range of motion   Mouth opening: Normal Cardiovascular    Rhythm: regular  Rate: normal    Murmur: Grade 2, Aortic area  Pertinent negatives: No peripheral edema   Dental    Dentition: Full upper dentures, Full lower dentures and Edentulous     Pulmonary  Breath sounds clear to auscultation               Abdominal  GI exam deferred       Other Findings            Anesthetic Plan    ASA: 3  Anesthesia type: total IV anesthesia and general - backup          Induction: Intravenous  Anesthetic plan and risks discussed with: Patient      Pt tolerated GA with LMA this am. She is returning to OR for additional lysis. Will plan GA with LMA unless she easily tolerates TIVA.           Physical Exam    Airway      Neck ROM: normal range of motion   Mouth opening: Normal     Cardiovascular               Dental         Pulmonary                 Abdominal         Other Findings            Anesthetic Plan    ASA: 3  Anesthesia type: general          Induction: Intravenous  Anesthetic plan and risks discussed with: Patient      LMA

## 2018-03-02 NOTE — OP NOTES
[address] [facility]  MR#: [id number]  : 1965  ACCOUNT #: [de-identified]   DATE OF SERVICE: 2018    CLINICAL SERVICE:  Vascular surgery. PREOPERATIVE DIAGNOSIS:  Follow up lysis check. POSTOPERATIVE DIAGNOSIS:  Follow up lysis check. PROCEDURE PERFORMED:  Left lower extremity arteriogram with followup thrombolysis, catheter check. SURGEON:  Arti Fritz. Elizabet Perea MD.     ANESTHESIA:  Sedation. ASSISTANT:      SPECIMENS REMOVED:      ESTIMATED BLOOD LOSS:      COMPLICATIONS:      IMPLANTS:      OPERATIVE FINDINGS:  The common femoral, SFA and popliteal arteries all patent. The tibioperoneal trunk was patent. Anterior tibial and the peroneal was done with a runoff down to the foot. The posterior tibial artery was occluded proximally. PROCEDURE IN DETAIL:  After giving informed consent, the patient was brought to the operating room. Anesthesia was then induced. Preop antibiotics were given. The patient's right groin was all prepped and draped in a normal sterile fashion. Over the thrombolysis catheter, we placed 0.018 wire. When we remove the catheter, we did a followup digital subtraction which showed no evidence of any disease in the common femoral, SFA and popliteal artery stents. The below knee popliteal artery was patent with anterior tibial artery and peroneal to be a dominant runoff down to the ankle. The posterior tibial artery was occluded proximally and reconstituted distally. We were happy with the results. We then removed the catheter and did ACT and it was 124. We closed it with Mynx. We held pressure for 15 minutes. The patient was then taken back to the PACU in stable condition.       MD Jennifer Menendez / Vanna  D: 2018 08:24     T: 2018 13:04  JOB #: [id number]

## 2018-03-02 NOTE — OP NOTES
[address] [facility]  MR#: [id number]  : 1965  ACCOUNT #: [de-identified]   DATE OF SERVICE: 2018    CLINICAL SERVICE:  Vascular surgery. PREOPERATIVE DIAGNOSIS:  Ischemic left foot with occluded left SFA and popliteal artery stent. POSTOPERATIVE DIAGNOSIS:  Ischemic left foot with occluded left SFA and popliteal artery stent. SURGICAL PROCEDURE:  1. Ultrasound-guided access of right common femoral artery with placement of catheter in aorta. 2.  Left lower extremity arteriogram second order abdomen, pelvis and legs. 3.  Placement of thrombolysis catheter in the SFA and popliteal artery. ATTENDING SURGEON:  Lonnie Patel MD    ASSISTANT:      ANESTHESIA:  General.    COMPLICATIONS:  None. SPECIMENS REMOVED:  None. ESTIMATED BLOOD LOSS:  15 mL     IMPLANTS:      INDICATIONS FOR PROCEDURE:  A 71-year-old female with history of peripheral vascular disease, status post SFA and popliteal artery angioplasty and stent back in January. Postoperatively, several days later, she occluded that stent and undergo thrombolysis overnight and was placed on Xarelto. She presented to the Emergency Department with worsening pain in the left leg. CTA showed she had occluded SFA and popliteal artery reconstitutes the popliteal artery in the tibial vessels. She was consented for the procedure. OPERATIVE FINDINGS:  1. There was no evidence of any aortoiliac disease. 2.  Bilateral hypogastric arteries were patent without any evidence of significant disease. 3.  Left lower extremity angiogram showed a patent common femoral artery and the deep profunda artery with the proximal SFA artery patent with the mid SFA and the mid SFA stent occluding the above knee popliteal artery stent occludes.   There were reconstituted in the below-knee popliteal artery and tibioperoneal trunk is patent with all 3 vessels patent; however, occludes distally above the malleolus. PROCEDURE IN DETAIL:  After giving informed consent, the patient was brought to the operating room. Anesthesia was then induced. Preop antibiotics were given before skin incision. The patient's bilateral groin was all prepped and draped in normal sterile fashion. Ultrasound-guided access was used to access the right common femoral artery over the common femoral head. Using micropuncture technique, we upsized to a 5-Martiniquais sheath over an .035 Starter wire. Omni Flush catheter was then placed in the distal aorta for an aortogram.  Please see above finding for anatomical findings. We were able to get the catheter up and over into the left common femoral artery and we did a left lower extremity runoff which showed a mid SFA and popliteal artery all occluded with reconstitution to below the knee popliteal artery and tibioperoneal trunks. We then upsized to a 6-Martiniquais Hunter sheath over 0.035 Amplatz wire. We then used a Glidewire and a Zoanne Primrose catheter to get through the occlusion of the SFA and the popliteal artery. The catheter was then placed in below knee popliteal artery. We confirmed we were intraluminal with the lower extremity duplex which showed filling of the tibial vessels. We were able to select the posterior tibial artery, were able to get the wire down and also did an arteriogram in the distal tibial which showed occlusion of all 3 vessels distally below the malleolus. We exchanged for 0.035 wire and then brought into the field a 135, 50 cm working  tPA catheter which we placed in the popliteal artery, SFA artery segments. We confirmed we were intraluminal postprocedure. We then injected 4 mg of tPA going down to the deep tibial, right above the tibioperoneal trunk down to the tibial vessels and we hooked up a tPA catheter with a 1 mg per hour in the sheath. The patient was extubated and transferred to PACU in stable condition.       Ivan Conner MD MADI /   D: 03/01/2018 09:05     T: 03/01/2018 13:17  JOB #: [id number]

## 2018-03-02 NOTE — PROGRESS NOTES
TRANSFER - IN REPORT:    Verbal report received from OR (name) on Shalini Barrientos  being received from OR (unit) for routine post - op      Report consisted of patients Situation, Background, Assessment and   Recommendations(SBAR). Information from the following report(s) SBAR, Kardex, OR Summary, Intake/Output, MAR, Accordion and Recent Results was reviewed with the receiving nurse. Opportunity for questions and clarification was provided. Assessment completed upon patients arrival to unit and care assumed. Care assumed by Dwayne.  SIMON

## 2018-03-03 LAB
INR PPP: 1.3
PROTHROMBIN TIME: 15.3 SEC (ref 11.5–14.5)

## 2018-03-03 PROCEDURE — 36415 COLL VENOUS BLD VENIPUNCTURE: CPT | Performed by: SURGERY

## 2018-03-03 PROCEDURE — 94760 N-INVAS EAR/PLS OXIMETRY 1: CPT

## 2018-03-03 PROCEDURE — 74011250637 HC RX REV CODE- 250/637: Performed by: ANESTHESIOLOGY

## 2018-03-03 PROCEDURE — 94640 AIRWAY INHALATION TREATMENT: CPT

## 2018-03-03 PROCEDURE — 74011250637 HC RX REV CODE- 250/637: Performed by: SURGERY

## 2018-03-03 PROCEDURE — 65660000004 HC RM CVT STEPDOWN

## 2018-03-03 PROCEDURE — 85610 PROTHROMBIN TIME: CPT | Performed by: SURGERY

## 2018-03-03 PROCEDURE — 74011250636 HC RX REV CODE- 250/636: Performed by: SURGERY

## 2018-03-03 RX ORDER — AMOXICILLIN 250 MG
2 CAPSULE ORAL DAILY
Status: DISCONTINUED | OUTPATIENT
Start: 2018-03-03 | End: 2018-03-05 | Stop reason: HOSPADM

## 2018-03-03 RX ADMIN — ISOSORBIDE MONONITRATE 60 MG: 60 TABLET, EXTENDED RELEASE ORAL at 08:32

## 2018-03-03 RX ADMIN — OXYCODONE HYDROCHLORIDE AND ACETAMINOPHEN 1 TABLET: 10; 325 TABLET ORAL at 02:05

## 2018-03-03 RX ADMIN — WARFARIN SODIUM 10 MG: 5 TABLET ORAL at 17:19

## 2018-03-03 RX ADMIN — Medication 10 ML: at 21:22

## 2018-03-03 RX ADMIN — Medication 10 ML: at 12:23

## 2018-03-03 RX ADMIN — ENOXAPARIN SODIUM 110 MG: 120 INJECTION SUBCUTANEOUS at 08:32

## 2018-03-03 RX ADMIN — RANOLAZINE 500 MG: 500 TABLET, FILM COATED, EXTENDED RELEASE ORAL at 08:33

## 2018-03-03 RX ADMIN — LACTULOSE 20 G: 20 SOLUTION ORAL at 17:18

## 2018-03-03 RX ADMIN — ALBUTEROL SULFATE 2 PUFF: 90 AEROSOL, METERED RESPIRATORY (INHALATION) at 20:00

## 2018-03-03 RX ADMIN — CARVEDILOL 12.5 MG: 12.5 TABLET, FILM COATED ORAL at 08:33

## 2018-03-03 RX ADMIN — OXYCODONE HYDROCHLORIDE 5 MG: 5 TABLET ORAL at 00:12

## 2018-03-03 RX ADMIN — PRASUGREL HYDROCHLORIDE 10 MG: 10 TABLET, FILM COATED ORAL at 08:39

## 2018-03-03 RX ADMIN — MORPHINE SULFATE 2 MG: 2 INJECTION, SOLUTION INTRAMUSCULAR; INTRAVENOUS at 12:10

## 2018-03-03 RX ADMIN — ALBUTEROL SULFATE 2 PUFF: 90 AEROSOL, METERED RESPIRATORY (INHALATION) at 08:21

## 2018-03-03 RX ADMIN — LEVOTHYROXINE SODIUM 25 MCG: 50 TABLET ORAL at 06:32

## 2018-03-03 RX ADMIN — OLMESARTAN MEDOXOMIL 40 MG: 40 TABLET, FILM COATED ORAL at 21:21

## 2018-03-03 RX ADMIN — ENOXAPARIN SODIUM 110 MG: 120 INJECTION SUBCUTANEOUS at 21:21

## 2018-03-03 RX ADMIN — STANDARDIZED SENNA CONCENTRATE AND DOCUSATE SODIUM 2 TABLET: 8.6; 5 TABLET, FILM COATED ORAL at 12:10

## 2018-03-03 RX ADMIN — CARVEDILOL 12.5 MG: 12.5 TABLET, FILM COATED ORAL at 17:19

## 2018-03-03 RX ADMIN — ATORVASTATIN CALCIUM 80 MG: 40 TABLET, FILM COATED ORAL at 21:21

## 2018-03-03 RX ADMIN — OXYCODONE HYDROCHLORIDE AND ACETAMINOPHEN 1 TABLET: 10; 325 TABLET ORAL at 07:10

## 2018-03-03 RX ADMIN — OXYCODONE HYDROCHLORIDE AND ACETAMINOPHEN 1 TABLET: 10; 325 TABLET ORAL at 15:47

## 2018-03-03 RX ADMIN — MORPHINE SULFATE 2 MG: 2 INJECTION, SOLUTION INTRAMUSCULAR; INTRAVENOUS at 04:53

## 2018-03-03 RX ADMIN — FUROSEMIDE 40 MG: 40 TABLET ORAL at 08:33

## 2018-03-03 RX ADMIN — ASPIRIN 81 MG 81 MG: 81 TABLET ORAL at 08:33

## 2018-03-03 RX ADMIN — OXYCODONE HYDROCHLORIDE AND ACETAMINOPHEN 1 TABLET: 10; 325 TABLET ORAL at 21:58

## 2018-03-03 NOTE — PROGRESS NOTES
TRANSFER - IN REPORT:    Verbal report received from [address] [place] [address] on [facility]  being received from CV[facility] for routine progression of care      Report consisted of patients Situation, Background, Assessment and   Recommendations(SBAR). Information from the following report(s) SBAR, Kardex, OR Summary, Intake/Output, MAR and Recent Results was reviewed with the receiving nurse. Opportunity for questions and clarification was provided. Assessment completed upon patients arrival to unit and care assumed. Dual skin assessment completed with RN. No redness or skin breakdown on sacrum/coccyx area noted. Skin intact.

## 2018-03-03 NOTE — PROGRESS NOTES
CARDIOTHORACIC SURGERY PROGRESS NOTE    3/3/2018  10:54 AM     Chart reviewed. Interval History/Events of Past 24 hours:   Stable  no pain  Foot warm    Subjective:  Patient seen and examined on rounds today. Spoke with available staff regarding patient . Objective:  Vital signs:   Visit Vitals    /61 (BP 1 Location: Right arm, BP Patient Position: At rest)    Pulse 83    Temp 98.9 °F (37.2 °C)    Resp 18    Ht 5' 7\" (1.702 m)    Wt 238 lb (108 kg)    SpO2 96%    BMI 37.28 kg/m2     Temp (24hrs), Av.6 °F (37.6 °C), Min:98.1 °F (36.7 °C), Max:103 °F (39.4 °C)    Admission Weight: Last Weight   Weight: 215 lb (97.5 kg) Weight: 238 lb (108 kg)     Physical Examination:     General:  Alert, oriented   Lungs: Clear to ascultation without rales, wheezes or rhonchi. Heart:  RRR without rub or murmur. Abdomen: Active sounds. Soft and non-tender without masses. Extremities: Edema present. Warm and well perfused. Neuro:   No deficit. DVT Prophylaxis:   pneumatic compression boots:   Compression stockings:    Heparin:            Labs:  Lab Results   Component Value Date/Time    WBC 11.4 (H) 2018 02:28 AM    HCT 36.5 2018 02:28 AM     2018 02:28 AM      Lab Results   Component Value Date/Time     2018 02:28 AM    K 3.8 2018 02:28 AM     2018 02:28 AM    CO2 29 2018 02:28 AM     (H) 2018 02:28 AM    BUN 9 2018 02:28 AM    CREA 0.73 2018 02:28 AM    CREA 0.67 2018 03:13 AM    CREA 0.82 2018 09:47 AM                 Assessment:  Doing well    Plans:  1.   Observe over the weekend        Jaron Grijalva DO

## 2018-03-03 NOTE — PROGRESS NOTES
Warfarin dosing per pharmacist    Dejan Corey is a 50 y.o. female. Height: 5' 7\" (170.2 cm)    Weight: 108 kg (238 lb)    Indication:  Distal thromboembolus    Goal INR:  2-3    Home dose:  10 mg alternating with 15 mg (per PTA med list 2015)    Risk factors or significant drug interactions:  lipitor    Other anticoagulants:  Lovenox bridge    Daily Monitoring  Date  INR     Warfarin dose HGB              Notes  3/2  1.1  10 mg  12.1   3/3  1.3  10 mg  ---     Pharmacy consulted to dose warfarin 3/2/18. Patient with significant h/o PVD s/p LLE thrombolysis for occluded SFA and popliteal stents. Most recently on Eliquis now changing to warfarin. Per prescription history patient filled the 10 mg tablets back in 2015 and was taking 10 mg alternating with 15 mg. INR to 1.3  Continue 10 mg this evening. Following daily INR. Thank you,  Wilton Aleman, Pharm. D.   Clinical Pharmacist  [phone]

## 2018-03-04 LAB
INR PPP: 2.2
PROTHROMBIN TIME: 23.6 SEC (ref 11.5–14.5)

## 2018-03-04 PROCEDURE — 94640 AIRWAY INHALATION TREATMENT: CPT

## 2018-03-04 PROCEDURE — 65660000004 HC RM CVT STEPDOWN

## 2018-03-04 PROCEDURE — 74011250636 HC RX REV CODE- 250/636: Performed by: SURGERY

## 2018-03-04 PROCEDURE — 97161 PT EVAL LOW COMPLEX 20 MIN: CPT

## 2018-03-04 PROCEDURE — 74011250637 HC RX REV CODE- 250/637: Performed by: ANESTHESIOLOGY

## 2018-03-04 PROCEDURE — 94760 N-INVAS EAR/PLS OXIMETRY 1: CPT

## 2018-03-04 PROCEDURE — 36415 COLL VENOUS BLD VENIPUNCTURE: CPT | Performed by: SURGERY

## 2018-03-04 PROCEDURE — 74011250637 HC RX REV CODE- 250/637: Performed by: SURGERY

## 2018-03-04 PROCEDURE — 85610 PROTHROMBIN TIME: CPT | Performed by: SURGERY

## 2018-03-04 RX ORDER — WARFARIN SODIUM 5 MG/1
5 TABLET ORAL SEE ADMIN INSTRUCTIONS
Status: DISCONTINUED | OUTPATIENT
Start: 2018-03-04 | End: 2018-03-04

## 2018-03-04 RX ADMIN — FUROSEMIDE 40 MG: 40 TABLET ORAL at 08:18

## 2018-03-04 RX ADMIN — ENOXAPARIN SODIUM 110 MG: 120 INJECTION SUBCUTANEOUS at 08:18

## 2018-03-04 RX ADMIN — OXYCODONE HYDROCHLORIDE AND ACETAMINOPHEN 1 TABLET: 10; 325 TABLET ORAL at 19:41

## 2018-03-04 RX ADMIN — ISOSORBIDE MONONITRATE 60 MG: 60 TABLET, EXTENDED RELEASE ORAL at 08:16

## 2018-03-04 RX ADMIN — ALBUTEROL SULFATE 2 PUFF: 90 AEROSOL, METERED RESPIRATORY (INHALATION) at 19:14

## 2018-03-04 RX ADMIN — ENOXAPARIN SODIUM 110 MG: 120 INJECTION SUBCUTANEOUS at 19:46

## 2018-03-04 RX ADMIN — ATORVASTATIN CALCIUM 80 MG: 40 TABLET, FILM COATED ORAL at 19:42

## 2018-03-04 RX ADMIN — OXYCODONE HYDROCHLORIDE AND ACETAMINOPHEN 1 TABLET: 10; 325 TABLET ORAL at 10:54

## 2018-03-04 RX ADMIN — ASPIRIN 81 MG 81 MG: 81 TABLET ORAL at 08:17

## 2018-03-04 RX ADMIN — ALBUTEROL SULFATE 2 PUFF: 90 AEROSOL, METERED RESPIRATORY (INHALATION) at 08:31

## 2018-03-04 RX ADMIN — CARVEDILOL 12.5 MG: 12.5 TABLET, FILM COATED ORAL at 16:37

## 2018-03-04 RX ADMIN — Medication 10 ML: at 15:47

## 2018-03-04 RX ADMIN — MORPHINE SULFATE 2 MG: 2 INJECTION, SOLUTION INTRAMUSCULAR; INTRAVENOUS at 01:15

## 2018-03-04 RX ADMIN — Medication 10 ML: at 05:06

## 2018-03-04 RX ADMIN — OLMESARTAN MEDOXOMIL 40 MG: 40 TABLET, FILM COATED ORAL at 16:36

## 2018-03-04 RX ADMIN — PRASUGREL HYDROCHLORIDE 10 MG: 10 TABLET, FILM COATED ORAL at 08:18

## 2018-03-04 RX ADMIN — RANOLAZINE 500 MG: 500 TABLET, FILM COATED, EXTENDED RELEASE ORAL at 08:17

## 2018-03-04 RX ADMIN — STANDARDIZED SENNA CONCENTRATE AND DOCUSATE SODIUM 2 TABLET: 8.6; 5 TABLET, FILM COATED ORAL at 08:17

## 2018-03-04 RX ADMIN — CARVEDILOL 12.5 MG: 12.5 TABLET, FILM COATED ORAL at 08:18

## 2018-03-04 RX ADMIN — LEVOTHYROXINE SODIUM 25 MCG: 50 TABLET ORAL at 06:19

## 2018-03-04 RX ADMIN — MORPHINE SULFATE 2 MG: 2 INJECTION, SOLUTION INTRAMUSCULAR; INTRAVENOUS at 16:35

## 2018-03-04 RX ADMIN — OXYCODONE HYDROCHLORIDE AND ACETAMINOPHEN 1 TABLET: 10; 325 TABLET ORAL at 03:28

## 2018-03-04 RX ADMIN — Medication 10 ML: at 19:42

## 2018-03-04 NOTE — PROGRESS NOTES
Problem: Mobility Impaired (Adult and Pediatric)  Goal: *Acute Goals and Plan of Care (Insert Text)  No goals indicated. Pt is functioning at baseline. PHYSICAL THERAPY: Initial Assessment, Discharge 3/4/2018  INPATIENT: Hospital Day: 4  Payor: CARE IMPROVEMENT PLUS / [place] / Product Type: Managed Care Medicare /      NAME/AGE/GENDER: Evette Eli is a 50 y.o. female   PRIMARY DIAGNOSIS: COLD LEFT FOOT  Ischemia of left lower extremity  S/P COLD FOOD  Ischemic leg  S/P COLD FOOD Ischemia of left lower extremity Ischemia of left lower extremity  Procedure(s) (LRB):  LEFT LOWER EXTREMITY LYSIS RECHECK  (Left)  2 Days Post-Op  ICD-10: Treatment Diagnosis:   · Generalized Muscle Weakness (M62.81)   Precaution/Allergies:  Review of patient's allergies indicates no known allergies. ASSESSMENT:     Ms. Irene Barrera presents sitting on edge of bed, spouse present, pt agreeable to treatment. Pt found to be independent/modified independent with all functional mobility and gait with a straight cane. Pt able to ambulate 500' with modified independence with straight cane on level surfaces. Minimal verbal cues provided for more correct use of straight cane. Pt demonstrated understanding. Pt also ascended/descended 8 steps with one rail and straight cane. Again, verbal cues need for correct sequence pattern, then pt was able to tell back and demonstrate correct technique. Pt reports that this is her baseline. Pt has been ambulating at home and in community with a cane and is very comfortable continuing with this. Pt states that she does not desire to ambulate without the cane as it is a security and a comfort for her to have. Secondary to pt performing at baseline, with the exception of a mild antalgic gait pattern which will improve as surgical discomfort improves, pt has no physical therapy needs. Pt is in agreement to discontinue physical therapy at this time. No PT needs identified.      This section established at most recent assessment   PROBLEM LIST (Impairments causing functional limitations):  1. Decreased Ambulation Ability/Technique   INTERVENTIONS PLANNED: (Benefits and precautions of physical therapy have been discussed with the patient.)  1. Following verbal cues, gait pattern improved during this evaluation. No further invterventions planned. TREATMENT PLAN: Frequency/Duration: Discharge PT  Rehabilitation Potential For Stated Goals: no goals indicated. RECOMMENDED REHABILITATION/EQUIPMENT: (at time of discharge pending progress): Due to the probability of continued deficits (see above) this patient will not likely need continued skilled physical therapy after discharge. Equipment:    pt to continue using her straight cane              HISTORY:   History of Present Injury/Illness (Reason for Referral):  Per chart: Ms. Will Solis is a 50y.o. year old female who presents to emergency department with 2 day history of worsening left leg pain. Patient has a history of peripheral vascular disease status post left SFA stent and popliteal artery stent placed back in January. Postop course was complicated by postop thrombosis of stent which required thrombolyzes and placed on long-term anticoagulation. Patient states she has been taking her Eliquis every day. She denies any chest pain or shortness of breath  Past Medical History/Comorbidities:   Ms. Grace Mann  has a past medical history of Arthritis; CAD (coronary artery disease) (CABG 2009); Chronic back pain (states cannot lie flat due to back pain); Chronic kidney disease; Chronic obstructive pulmonary disease ([address] Chronic pain; Coagulation defect ([address] DDD (degenerative disc disease), lumbar; Depression; Fibromyalgia; GERD (gastroesophageal reflux disease); Hypercholesterolemia; Hypertension; Joint ache; Left leg pain; Liver disease; Memory impairment; MI, old (x 2); Narcolepsy; Obesity (BMI 30-39.9);  Persistent insomnia ( ); PVD (peripheral vascular disease) ([facility] S/P CABG x 4 (); Stented coronary artery; Thyroid disease; and Vertigo. She also has no past medical history of Adverse effect of anesthesia; Difficult intubation; Malignant hyperthermia due to anesthesia; Nausea & vomiting; Pseudocholinesterase deficiency; or Psychiatric disorder. Ms. Spurgeon Lennox  has a past surgical history that includes hx cholecystectomy; hx coronary stent placement (); hx other surgical (); hx  section; hx hysterectomy; hx salpingo-oophorectomy; hx orthopaedic (); hx coronary artery bypass graft (); vascular surgery procedure unlist (Left, 01/15/2018); vascular surgery procedure unlist (Left, 2018); and vascular surgery procedure unlist (Left, 2018). Social History/Living Environment:   Home Environment: Apartment  # Steps to Enter: 0  Wheelchair Ramp: No  One/Two Story Residence: One story  Living Alone: No  Support Systems: Spouse/Significant Other/Partner  Patient Expects to be Discharged to[de-identified] Apartment  Current DME Used/Available at Home: Cane, straight  Tub or Shower Type: Tub/Shower combination  Prior Level of Function/Work/Activity:  Pt was independent with a straight cane in community and at home. Pt lives with spouse and granddaughter. Drives. Does not work. No recent falls. Independent with all ADLs. Personal Factors:          Sex:  female        Age:  50 y.o. Number of Personal Factors/Comorbidities that affect the Plan of Care: 1-2: MODERATE COMPLEXITY   EXAMINATION:   Most Recent Physical Functioning:   Gross Assessment:  AROM: Within functional limits  Strength:  Within functional limits  Coordination: Within functional limits  Sensation: Intact               Posture:  Posture (WDL): Within defined limits  Balance:  Sitting: Intact  Standing: Intact (with straight cane) Bed Mobility:  Rolling: Independent  Supine to Sit: Independent  Sit to Supine: Independent  Scooting: Independent  Wheelchair Mobility:     Transfers:  Sit to Stand: Independent  Stand to Sit: Independent  Bed to Chair: Independent  Gait:     Speed/Melinda: Pace decreased (<100 feet/min)  Swing Pattern: Left asymmetrical  Stance: Left decreased  Gait Abnormalities: Antalgic  Distance (ft): 500 Feet (ft)  Assistive Device: Cane, straight  Ambulation - Level of Assistance: Independent; Modified independent  Number of Stairs Trained: 8  Stairs - Level of Assistance: Independent; Modified independent  Rail Use:  (left rail, straight cane in right)      Body Structures Involved:  1. arteries Body Functions Affected:  1. Movement Related Activities and Participation Affected:  1. General Tasks and Demands  2. Mobility   Number of elements that affect the Plan of Care: 4+: HIGH COMPLEXITY   CLINICAL PRESENTATION:   Presentation: Stable and uncomplicated: LOW COMPLEXITY   CLINICAL DECISION MAKIN[address] [place], [place] [id number] AM-PAC 6 Clicks   Basic Mobility Inpatient Short Form  How much difficulty does the patient currently have. .. Unable A Lot A Little None   1. Turning over in bed (including adjusting bedclothes, sheets and blankets)? [] 1   [] 2   [] 3   [x] 4   2. Sitting down on and standing up from a chair with arms ( e.g., wheelchair, bedside commode, etc.)   [] 1   [] 2   [] 3   [x] 4   3. Moving from lying on back to sitting on the side of the bed? [] 1   [] 2   [] 3   [x] 4   How much help from another person does the patient currently need. .. Total A Lot A Little None   4. Moving to and from a bed to a chair (including a wheelchair)? [] 1   [] 2   [] 3   [x] 4   5. Need to walk in hospital room? [] 1   [] 2   [] 3   [x] 4   6. Climbing 3-5 steps with a railing? [] 1   [] 2   [] 3   [x] 4   © , Trustees of [address] [place], [place] [address], under license to [employer].  All rights reserved      Score:  Initial: 24 Most Recent: X (Date: -- )    Interpretation of Tool:  Represents activities that are increasingly more difficult (i.e. Bed mobility, Transfers, Gait). Score 24 23 22-20 19-15 14-10 9-7 6     Modifier CH CI CJ CK CL CM CN      ? Mobility - Walking and Moving Around:     - CURRENT STATUS: CH - 0% impaired, limited or restricted    - GOAL STATUS: CH - 0% impaired, limited or restricted    - D/C STATUS:  CH - 0% impaired, limited or restricted  Payor: CARE IMPROVEMENT PLUS / [place]PR[place] / Product Type: [employer] Care Medicare /      Medical Necessity:     · none. Reason for Services/Other Comments:  · none. Use of outcome tool(s) and clinical judgement create a POC that gives a: Clear prediction of patient's progress: LOW COMPLEXITY            TREATMENT:   (In addition to Assessment/Re-Assessment sessions the following treatments were rendered)   Pre-treatment Symptoms/Complaints:  \"I like using my cane. She's my friend. \"  Pain: Initial:   Pain Intensity 1: 0  Post Session:  0/10     Assessment/Reassessment only, no treatment provided today    Braces/Orthotics/Lines/Etc:   · O2 Device: Room air  Treatment/Session Assessment:    · Response to Treatment:  Tolerated without difficulty. · Interdisciplinary Collaboration:   o Physical Therapist  o Registered Nurse  · After treatment position/precautions:   o Bed/Chair-wheels locked  o Bed in low position  o Call light within reach  o RN notified  o Family at bedside  o sitting on edge of bed per pt request   · Compliance with Program/Exercises: no home exercise program warranted. · Recommendations/Intent for next treatment session:  Discharge PT.   Total Treatment Duration:  PT Patient Time In/Time Out  Time In: 1016  Time Out: Lizeth Benavides [phone], PT

## 2018-03-04 NOTE — PROGRESS NOTES
Bedside and Verbal shift change report given to Carmita Junior (oncoming nurse) by Ainsley Velazquez (offgoing nurse). Report included the following information SBAR, Kardex, Intake/Output, MAR, Recent Results and Med Rec Status.

## 2018-03-04 NOTE — PROGRESS NOTES
CARDIOTHORACIC SURGERY PROGRESS NOTE    3/4/2018  7:35 AM     Chart reviewed. Interval History/Events of Past 24 hours:   Doing  Well, foot warm, incisions dry    Subjective:  Patient seen and examined on rounds today. Spoke with available staff regarding patient . Objective:  Vital signs:   Visit Vitals    /53    Pulse 79    Temp 99.8 °F (37.7 °C)    Resp 18    Ht 5' 7\" (1.702 m)    Wt 238 lb 15.7 oz (108.4 kg)    SpO2 95%    BMI 37.43 kg/m2     Temp (24hrs), Av.9 °F (37.7 °C), Min:99.5 °F (37.5 °C), Max:100.5 °F (38.1 °C)    Admission Weight: Last Weight   Weight: 215 lb (97.5 kg) Weight: 238 lb 15.7 oz (108.4 kg)     Physical Examination:     General:  Alert, oriented   Lungs: Clear to ascultation without rales, wheezes or rhonchi. Heart:  RRR without rub or murmur. Abdomen: Active sounds. Soft and non-tender without masses. Extremities: Edema not present. Warm and well perfused. Neuro:   No deficit. DVT Prophylaxis:   pneumatic compression boots: No  Compression stockings:  No  Heparin:  Yes          Labs:  Lab Results   Component Value Date/Time    WBC 11.4 (H) 2018 02:28 AM    HCT 36.5 2018 02:28 AM     2018 02:28 AM      Lab Results   Component Value Date/Time     2018 02:28 AM    K 3.8 2018 02:28 AM     2018 02:28 AM    CO2 29 2018 02:28 AM     (H) 2018 02:28 AM    BUN 9 2018 02:28 AM    CREA 0.73 2018 02:28 AM    CREA 0.67 2018 03:13 AM    CREA 0.82 2018 09:47 AM             Assessment:  Doing well  Anticoagulate and then home    Plans:  1.   Home soon    Quynh Hosknis DO

## 2018-03-04 NOTE — PROGRESS NOTES
Bedside and Verbal shift change report given to Elizabeth Sams (oncoming nurse) by Boo Vicente (offgoing nurse). Report included the following information SBAR, Kardex, Intake/Output, MAR, Recent Results and Med Rec Status.

## 2018-03-04 NOTE — PROGRESS NOTES
Warfarin dosing per pharmacist    Julia Mclean is a 50 y.o. female. Height: 5' 7\" (170.2 cm)    Weight: 108.4 kg (238 lb 15.7 oz)    Indication:  Distal thromboembolus    Goal INR:  2-3    Home dose:  10 mg alternating with 15 mg (per PTA med list 2015)    Risk factors or significant drug interactions:  lipitor    Other anticoagulants:  Lovenox bridge    Daily Monitoring  Date  INR     Warfarin dose HGB              Notes  3/2  1.1  10 mg  12.1   3/3  1.3  10 mg  ---   3/4  2.2  Hold  ---    Pharmacy consulted to dose warfarin 3/2/18. Patient with significant h/o PVD s/p LLE thrombolysis for occluded SFA and popliteal stents. Most recently on Eliquis now changing to warfarin. Per prescription history patient filled the 10 mg tablets back in 2015 and was taking 10 mg alternating with 15 mg. INR quickly to 2.2 after only 2 doses. Patient looks to be more sensitive than previous dosing currently. Hold tonight's dose. Following daily INR    Thank you,  Maty Patel, Pharm. D.   Clinical Pharmacist  [phone]

## 2018-03-05 VITALS
HEART RATE: 67 BPM | WEIGHT: 237.5 LBS | DIASTOLIC BLOOD PRESSURE: 71 MMHG | HEIGHT: 67 IN | RESPIRATION RATE: 18 BRPM | TEMPERATURE: 98.1 F | OXYGEN SATURATION: 98 % | SYSTOLIC BLOOD PRESSURE: 111 MMHG | BODY MASS INDEX: 37.28 KG/M2

## 2018-03-05 LAB
INR PPP: 1.7
PROTHROMBIN TIME: 19 SEC (ref 11.5–14.5)

## 2018-03-05 PROCEDURE — 74011250636 HC RX REV CODE- 250/636: Performed by: SURGERY

## 2018-03-05 PROCEDURE — 74011250637 HC RX REV CODE- 250/637: Performed by: ANESTHESIOLOGY

## 2018-03-05 PROCEDURE — 85610 PROTHROMBIN TIME: CPT | Performed by: SURGERY

## 2018-03-05 PROCEDURE — 74011250637 HC RX REV CODE- 250/637: Performed by: SURGERY

## 2018-03-05 PROCEDURE — 36415 COLL VENOUS BLD VENIPUNCTURE: CPT | Performed by: SURGERY

## 2018-03-05 PROCEDURE — 94640 AIRWAY INHALATION TREATMENT: CPT

## 2018-03-05 RX ORDER — TRAMADOL HYDROCHLORIDE 50 MG/1
50 TABLET ORAL
Qty: 20 TAB | Refills: 0 | Status: SHIPPED
Start: 2018-03-05

## 2018-03-05 RX ORDER — ENOXAPARIN SODIUM 150 MG/ML
120 INJECTION SUBCUTANEOUS EVERY 12 HOURS
Qty: 10 SYRINGE | Refills: 0 | Status: SHIPPED | OUTPATIENT
Start: 2018-03-05 | End: 2018-03-10

## 2018-03-05 RX ORDER — AMOXICILLIN 250 MG
2 CAPSULE ORAL DAILY
Qty: 60 TAB | Status: SHIPPED | OUTPATIENT
Start: 2018-03-06

## 2018-03-05 RX ORDER — WARFARIN SODIUM 5 MG/1
5 TABLET ORAL EVERY EVENING
Status: DISCONTINUED | OUTPATIENT
Start: 2018-03-05 | End: 2018-03-05 | Stop reason: HOSPADM

## 2018-03-05 RX ORDER — WARFARIN SODIUM 5 MG/1
5 TABLET ORAL EVERY EVENING
Qty: 30 TAB | Refills: 1 | Status: SHIPPED | OUTPATIENT
Start: 2018-03-05

## 2018-03-05 RX ADMIN — OXYCODONE HYDROCHLORIDE AND ACETAMINOPHEN 1 TABLET: 10; 325 TABLET ORAL at 08:40

## 2018-03-05 RX ADMIN — ISOSORBIDE MONONITRATE 60 MG: 60 TABLET, EXTENDED RELEASE ORAL at 08:40

## 2018-03-05 RX ADMIN — OXYCODONE HYDROCHLORIDE AND ACETAMINOPHEN 1 TABLET: 10; 325 TABLET ORAL at 00:19

## 2018-03-05 RX ADMIN — STANDARDIZED SENNA CONCENTRATE AND DOCUSATE SODIUM 2 TABLET: 8.6; 5 TABLET, FILM COATED ORAL at 08:39

## 2018-03-05 RX ADMIN — ENOXAPARIN SODIUM 110 MG: 120 INJECTION SUBCUTANEOUS at 10:52

## 2018-03-05 RX ADMIN — RANOLAZINE 500 MG: 500 TABLET, FILM COATED, EXTENDED RELEASE ORAL at 08:39

## 2018-03-05 RX ADMIN — FUROSEMIDE 40 MG: 40 TABLET ORAL at 08:39

## 2018-03-05 RX ADMIN — LEVOTHYROXINE SODIUM 25 MCG: 50 TABLET ORAL at 05:25

## 2018-03-05 RX ADMIN — ASPIRIN 81 MG 81 MG: 81 TABLET ORAL at 08:40

## 2018-03-05 RX ADMIN — PRASUGREL HYDROCHLORIDE 10 MG: 10 TABLET, FILM COATED ORAL at 08:40

## 2018-03-05 RX ADMIN — CARVEDILOL 12.5 MG: 12.5 TABLET, FILM COATED ORAL at 08:39

## 2018-03-05 RX ADMIN — Medication 10 ML: at 05:05

## 2018-03-05 RX ADMIN — ALBUTEROL SULFATE 2 PUFF: 90 AEROSOL, METERED RESPIRATORY (INHALATION) at 07:12

## 2018-03-05 NOTE — PROGRESS NOTES
Warfarin dosing per pharmacist    Arcelia Reza is a 50 y.o. female. Height: 5' 7\" (170.2 cm)    Weight: 107.7 kg (237 lb 8 oz)    Indication:  Distal thromboembolus    Goal INR:  2-3    Home dose:  10 mg alternating with 15 mg (per PTA med list 2015)    Risk factors or significant drug interactions:  lipitor    Other anticoagulants:  Lovenox bridge    Daily Monitoring  Date  INR     Warfarin dose HGB              Notes  3/2  1.1  10 mg  12.1   3/3  1.3  10 mg  ---   3/4  2.2  Hold  ---  3/5  1.7  5 mg  ----    Pharmacy consulted to dose warfarin 3/2/18. Patient with significant h/o PVD s/p LLE thrombolysis for occluded SFA and popliteal stents. Most recently on Eliquis now changing to warfarin. Per prescription history patient filled the 10 mg tablets back in 2015 and was taking 10 mg alternating with 15 mg. INR quickly to 2.2 after only 2 doses. Dose held last night and now INR 1.7. Will give 5 mg tonight and continue to follow.     Thank you,  Dorota Hoskins, PharmD  Clinical Pharmacist  [phone]

## 2018-03-05 NOTE — PROGRESS NOTES
Pt set up for shower instructed on how to clean R groin area with Hibiclens and the rest of her skin with Hibiclens.

## 2018-03-05 NOTE — PROGRESS NOTES
Reviewed with patient in detail how to care for R groin site.  Reviewed s/s of infection and when to call MD.

## 2018-03-05 NOTE — PROGRESS NOTES
[address]   [address] [place] [address]. [address] FAX: [phone]         VASCULAR SURGERY FLOOR PROGRESS NOTE    Admit Date: 3/1/2018  POD: 3 Days Post-Op    Procedure:  Procedure(s):  LEFT LOWER EXTREMITY LYSIS RECHECK     Subjective:     Patient has no new complaints. Objective:     Vitals:  Blood pressure 101/72, pulse 62, temperature 99.9 °F (37.7 °C), resp. rate 16, height 5' 7\" (1.702 m), weight 237 lb 8 oz (107.7 kg), SpO2 95 %. Temp (24hrs), Av °F (37.2 °C), Min:97.6 °F (36.4 °C), Max:99.9 °F (37.7 °C)      Intake / Output:    Intake/Output Summary (Last 24 hours) at 18 0730  Last data filed at 18 1804   Gross per 24 hour   Intake              640 ml   Output                0 ml   Net              640 ml       Physical Exam:    Constitutional: she appears well-developed. No distress. HENT:   Head: Atraumatic. Eyes: Pupils are equal, round, and reactive to light. Neck: Normal range of motion. Cardiovascular: Regular rhythm. Pulmonary/Chest: Effort normal and breath sounds normal. No respiratory distress. Abdominal: Soft. Bowel sounds are normal. she exhibits no distension. There is no tenderness. There is no guarding. No hernia. Musculoskeletal: Normal range of motion. Neurological: She is alert. CN II- XII grossly intact  Vascular: Right groin no hematoma palpable left anterior tibial pulse    Labs: No results for input(s): HGB, WBC, K, GLU, HGBEXT in the last 72 hours.     No lab exists for component:  CREA    Data Review     Assessment:     Patient Active Problem List    Diagnosis Date Noted    Tobacco use disorder 2018    Ischemia of left lower extremity 2018    Ischemic leg 2018    Thrombosis of left femoral artery ([address] 2018    Arterial stenosis ([facility] 01/10/2018    Snoring 2017    Chronic fatigue 2017    Hyperlipidemia 2016    Essential hypertension 2016    Coronary artery disease involving native coronary artery without angina pectoris 01/28/2016    Bilateral low back pain without sciatica 12/06/2015    Hypersomnia 12/06/2015    Obesity 12/06/2015    Obesity, Class I, BMI 30-34.9 12/06/2015       Plan/Recommendations/Medical Decision Making:     Patient lower extremity well-perfused with palpable pedal pulses  We will continue Lovenox and Coumadin bridge-ideal INR between 2 and 3  -Possible option of discharge today with therapeutic Lovenox at home and Coumadin bridge will have nurses set up INR check on discharge    Elements of this note have been dictated using speech recognition software. As a result, errors of speech recognition may have occurred.

## 2018-03-05 NOTE — PROGRESS NOTES
Discharge instructions, follow up appointments and prescriptions reviewed with patient and family. Both verbalize understanding. All personal belongings taken with patient. Family member will drive patient home. Patient escorted to discharge area via wheelchair. Patient is stable at discharge. Rx given for Ultram, enoxaparin, senna-docusate(home medication, warfarin. PIV and monitor removed.

## 2018-03-05 NOTE — DISCHARGE INSTRUCTIONS
DISCHARGE SUMMARY from Nurse    PATIENT INSTRUCTIONS:    After general anesthesia or intravenous sedation, for 24 hours or while taking prescription Narcotics:  · Limit your activities  · Do not drive and operate hazardous machinery  · Do not make important personal or business decisions  · Do  not drink alcoholic beverages  · If you have not urinated within 8 hours after discharge, please contact your surgeon on call. Report the following to your surgeon:  · Excessive pain, swelling, redness or odor of or around the surgical area  · Temperature over 100.5  · Nausea and vomiting lasting longer than 4 hours or if unable to take medications  · Any signs of decreased circulation or nerve impairment to extremity: change in color, persistent  numbness, tingling, coldness or increase pain  · Any questions    What to do at Home:  Recommended activity: {discharge activity:84847}, ***    If you experience any of the following symptoms ***, please follow up with ***. *  Please give a list of your current medications to your Primary Care Provider. *  Please update this list whenever your medications are discontinued, doses are      changed, or new medications (including over-the-counter products) are added. *  Please carry medication information at all times in case of emergency situations. These are general instructions for a healthy lifestyle:    No smoking/ No tobacco products/ Avoid exposure to second hand smoke  Surgeon General's Warning:  Quitting smoking now greatly reduces serious risk to your health.     Obesity, smoking, and sedentary lifestyle greatly increases your risk for illness    A healthy diet, regular physical exercise & weight monitoring are important for maintaining a healthy lifestyle    You may be retaining fluid if you have a history of heart failure or if you experience any of the following symptoms:  Weight gain of 3 pounds or more overnight or 5 pounds in a week, increased swelling in our hands or feet or shortness of breath while lying flat in bed. Please call your doctor as soon as you notice any of these symptoms; do not wait until your next office visit. Recognize signs and symptoms of STROKE:    F-face looks uneven    A-arms unable to move or move unevenly    S-speech slurred or non-existent    T-time-call 911 as soon as signs and symptoms begin-DO NOT go       Back to bed or wait to see if you get better-TIME IS BRAIN. Warning Signs of HEART ATTACK     Call 911 if you have these symptoms:   Chest discomfort. Most heart attacks involve discomfort in the center of the chest that lasts more than a few minutes, or that goes away and comes back. It can feel like uncomfortable pressure, squeezing, fullness, or pain.  Discomfort in other areas of the upper body. Symptoms can include pain or discomfort in one or both arms, the back, neck, jaw, or stomach.  Shortness of breath with or without chest discomfort.  Other signs may include breaking out in a cold sweat, nausea, or lightheadedness. Don't wait more than five minutes to call 911 - MINUTES MATTER! Fast action can save your life. Calling 911 is almost always the fastest way to get lifesaving treatment. Emergency Medical Services staff can begin treatment when they arrive -- up to an hour sooner than if someone gets to the hospital by car. The discharge information has been reviewed with the {PATIENT PARENT GUARDIAN:53507}. The {PATIENT PARENT GUARDIAN:63387} verbalized understanding. Discharge medications reviewed with the {Dishcarge meds reviewed GYKK:12760} and appropriate educational materials and side effects teaching were provided.   ___________________________________________________________________________________________________________________________________

## 2018-03-05 NOTE — PROGRESS NOTES
Shower complete I manually compressed area around puncture site small amounts of purulent drainage oozed from puncture site. Cleaned area with sterile 4x4 gauze and Hibiclens soap folded 4x4 gauze Tegaderm applied to site.

## 2018-03-05 NOTE — PROGRESS NOTES
During assessment purulent drainage noted from R groin small amount area very tender to touch. Dr. Jose Luis barron made aware of purulent drainage from groin. MD stated he was aware that he saw that during his am assessment no new orders received.

## 2018-03-05 NOTE — DISCHARGE SUMMARY
[address] [place] [address]          Physician Discharge Summary     Patient: Shalini Barrientos MRN: [id number]  SSN: [id number]    YOB: 1965  Age: 50 y.o. Sex: female       Admit Date: 3/1/2018    Discharge Date: 3/5/2018      Admitting Physician: Abbie Akers MD     Discharge Provider: Hansel Alexander PA-C    Admission Diagnoses:   COLD LEFT FOOT  Ischemia of left lower extremity  Ischemic leg    Discharge Diagnoses:   Problem List as of 3/5/2018  Date Reviewed: 3/5/2018          Codes Class Noted - Resolved    Tobacco use disorder ICD-10-CM: F17.200  ICD-9-CM: 305.1  3/2/2018 - Present        * (Principal)Ischemia of left lower extremity ICD-10-CM: I99.8  ICD-9-CM: 459.9  3/1/2018 - Present        Ischemic leg ICD-10-CM: I99.8  ICD-9-CM: 459.9  3/1/2018 - Present        Thrombosis of left femoral artery ([facility] [address] ICD-10-CM: I74.3  ICD-9-CM: 444.22  1/16/2018 - Present        Arterial stenosis ([facility] [address] ICD-10-CM: I77.1  ICD-9-CM: 447.1  1/10/2018 - Present    Overview Signed 1/10/2018  3:55 PM by Marylene Bruin, MD     Multiple areas.   Under care of vascular doctors and cardiologist.             Snoring ICD-10-CM: R06.83  ICD-9-CM: 786.09  6/1/2017 - Present        Chronic fatigue ICD-10-CM: R53.82  ICD-9-CM: 780.79  3/23/2017 - Present        Hyperlipidemia ICD-10-CM: E78.5  ICD-9-CM: 272.4  1/28/2016 - Present        Essential hypertension ICD-10-CM: I10  ICD-9-CM: 401.9  1/28/2016 - Present        Coronary artery disease involving native coronary artery without angina pectoris ICD-10-CM: I25.10  ICD-9-CM: 414.01  1/28/2016 - Present        Bilateral low back pain without sciatica ICD-10-CM: M54.5  ICD-9-CM: 724.2  12/6/2015 - Present        Hypersomnia ICD-10-CM: G47.10  ICD-9-CM: 780.54  12/6/2015 - Present        Obesity ICD-10-CM: E66.9  ICD-9-CM: 278.00  12/6/2015 - Present        Obesity, Class I, BMI 30-34.9 ICD-10-CM: E66.9  ICD-9-CM: 278.00  12/6/2015 - Present        RESOLVED: Obesity (BMI 30.0-34.9) ICD-10-CM: E66.9  ICD-9-CM: 278.00  6/22/2016 - 5/23/2017               Procedures for this Admission: Procedure(s):  3/1/2018, AM:  1. Ultrasound-guided access of right common femoral artery with placement of catheter in aorta. 2.  Left lower extremity arteriogram second order abdomen, pelvis and legs. 3.  Placement of thrombolysis catheter in the SFA and popliteal artery. 3/1/2018, PM:  Follow up thrombolysis catheter check to the left lower extremity arteriogram.  3/2/2018, AM:  Left lower extremity arteriogram with followup thrombolysis, catheter check.       Discharged Condition: stable    Brief History: Nadine Arambula was admitted with the following history of present illness. The patient is a 54yo female with history of peripheral vascular disease, status post SFA and popliteal artery angioplasty and stent back in January, 2018. Postoperatively several days later, she occluded that stent, underwent thrombolysis overnight and was placed on Xarelto. She presented to the Emergency Department 3/1/2018 with worsening pain in the left leg and poorly palpable pulses. CTA showed she had occluded SFA and popliteal artery reconstituted in the tibial vessels. Dr. Mirta Abreu recommended left lower extremity intervention and re-vascularization attempt. She agreed and was consented for the procedures. Hospital Course: On day of admission, the patient was taken to the operating room and underwent the first of several procedures, placement of thrombolysis catheter in the left SFA and popliteal artery. She was allowed to recover in PACU then returned to the OR for the second procedure, L LE arteriogram and thrombolysis catheter check. She was then transferred to CV ICU and monitored overnight; the following morning she was taken back to the OR for the third procedure, follow-up thrombolysis and catheter check.  Her pain was controlled, and she tolerated an advancing diet. She was transferred to CV Stepdown unit and continued to convalesce. She participated with physical therapy. On POD #3, she was stable and deemed suitable for discharge to home with follow-up appointment with us and with [facility] and their warfarin clinic. Consults: None    Significant Diagnostic Studies: labs; radiology: LE duplex, CTA abdomen with run-off and intra-operative fluoroscopy; cardiac graphics: 2D echocardiogram    Treatments:   IV hydration  antibiotics: Ancef  analgesia: acetaminophen, acetaminophen w/ codeine, Dilaudid IV, morphine IV, oxycodone w/ acetaminophen and oxycodone IR  cardiac meds: carvedilol, furosemide, isosorbide mononitrate ER, olmesartan  anticoagulation: ASA, Effient  surgery: as noted above    Discharge Exam:  GENERAL: alert, cooperative, no distress, appears older than stated age  EYE: EOM intact, no nystagmus  NECK: supple, no JVD or bruits  LUNG: clear to auscultation bilaterally, normal respiratory effort  HEART: regular rate and rhythm  ABDOMEN: soft, nontender, nondistended, bowel sounds normoactive  EXTREMITIES: warm, normal ROM; right groin puncture wound with small amount of serous drainage on gauze, moderate induration but area with numerous scars, no erythema or purulence  PULSES: radial palpable 2+ and symmetric bilaterally; right dorsalis pedis and left posterior tibialis palpable    Disposition: home with follow-up appointment with us (next week) and with [facility] (next week) and their warfarin clinic (this week). Goal INR between 2 and 3. Patient Instructions:   Current Discharge Medication List      START taking these medications    Details   enoxaparin (LOVENOX) 120 mg/0.8 mL injection 120 mg by SubCUTAneous route every twelve (12) hours every twelve (12) hours for 5 days. Qty: 10 Syringe, Refills: 0      senna-docusate (PERICOLACE) 8.6-50 mg per tablet Take 2 Tabs by mouth daily. Indications: constipation  Qty: 60 Tab, Refills: prn      warfarin (COUMADIN) 5 mg tablet Take 1 Tab by mouth every evening. Indications: Distal thromboembolus (PVD)  Qty: 30 Tab, Refills: 1      traMADol (ULTRAM) 50 mg tablet Take 1 Tab by mouth every six (6) hours as needed for Pain. Max Daily Amount: 200 mg. Qty: 20 Tab, Refills: 0    Associated Diagnoses: Ischemia of left lower extremity         CONTINUE these medications which have NOT CHANGED    Details   meclizine (ANTIVERT) 25 mg tablet One tab three times a day as needed for vertigo  Qty: 15 Tab, Refills: 0    Associated Diagnoses: Vertigo      clonazePAM (KLONOPIN) 1 mg tablet Take 1 Tab by mouth daily as needed. Qty: 30 Tab, Refills: 0    Associated Diagnoses: Anxiety      levothyroxine (SYNTHROID) 25 mcg tablet Take 1 Tab by mouth Daily (before breakfast). Qty: 90 Tab, Refills: 0    Associated Diagnoses: Acquired hypothyroidism      omeprazole (PRILOSEC) 20 mg capsule TAKE 1 CAPSULE BY MOUTH DAILY. INDICATIONS: GASTROESOPHAGEAL REFLUX, HEARTBURN  Qty: 30 Cap, Refills: 2      Armodafinil (NUVIGIL) 250 mg tab tablet Take 1 Tab by mouth Daily (before breakfast). Max Daily Amount: 250 mg.  Qty: 30 Tab, Refills: 5      furosemide (LASIX) 40 mg tablet TAKE 1 TABLET BY MOUTH EVERY DAY  Qty: 30 Tab, Refills: 1      aspirin delayed-release 81 mg tablet Take 81 mg by mouth daily. ranolazine ER (RANEXA) 500 mg SR tablet Take 500 mg by mouth daily. amLODIPine (NORVASC) 10 mg tablet TAKE 1 TABLET (10 MG) BY MOUTH DAILY. Refills: 11      albuterol (PROVENTIL HFA, VENTOLIN HFA, PROAIR HFA) 90 mcg/actuation inhaler Take 2 Puffs by inhalation every six (6) hours as needed for Wheezing or Shortness of Breath. Qty: 1 Inhaler, Refills: 0      budesonide-formoterol (SYMBICORT) 160-4.5 mcg/actuation HFA inhaler Take 2 Puffs by inhalation two (2) times a day.   Qty: 1 Inhaler, Refills: 0      olmesartan (BENICAR) 40 mg tablet Take 1 Tab by mouth every evening. Qty: 30 Tab, Refills: 6    Associated Diagnoses: Essential hypertension      atorvastatin (LIPITOR) 80 mg tablet Take 80 mg by mouth nightly. carvedilol (COREG) 12.5 mg tablet Take 12.5 mg by mouth two (2) times a day. isosorbide mononitrate ER (IMDUR) 60 mg CR tablet Take 60 mg by mouth two (2) times a day. multivitamin (ONE A DAY) tablet Take 1 Tab by mouth daily. Stopped 1/12/18  Indications: did not hold      acetaminophen-codeine (TYLENOL-CODEINE #3) 300-30 mg per tablet Take 1 Tab by mouth every four (4) hours as needed for Pain. Max Daily Amount: 6 Tabs. Qty: 12 Tab, Refills: 0    Associated Diagnoses: Costochondritis      clobetasol (TEMOVATE) 0.05 % topical cream Apply  to affected area two (2) times a day. Qty: 60 g, Refills: 2    Associated Diagnoses: Eczema, dyshidrotic      nitroglycerin (NITROSTAT) 0.4 mg SL tablet 1 Tab by SubLINGual route every five (5) minutes as needed. Qty: 25 Tab, Refills: 3    Associated Diagnoses: Coronary artery disease involving native coronary artery without angina pectoris         STOP taking these medications       rivaroxaban (XARELTO) 20 mg tab tablet Comments:   Reason for Stopping:         prasugrel (EFFIENT) 10 mg tablet Comments:   Reason for Stopping:               Reference MD discharge instructions, as well as those provided by nursing for diet, labs, medications, activity, wound care and any outpatient referrals. I have reviewed the patients controlled substance prescription history as maintained in the [place] prescription monitoring program so that the prescription(s) for a controlled substance can be given.       Follow-up Appointments   Procedures    FOLLOW UP VISIT Appointment in: Ten Days Follow up in office with Dr. Azar Lucas / Марина Anton NP in     Follow up in office with Dr. Azar Lucas / Марина Anton NP in     Standing Status:   Standing     Number of Occurrences:   1     Order Specific Question: Appointment in     Answer:   1 week        Signed: Agueda Garza PA-C  3/5/2018 10:53 AM  Physician Assistant with Vascular Surgery Niru Mcpherson MD / Jameson Lebron.  Adrienne Roche MD / Altagracia Nur MD

## 2018-03-21 PROBLEM — E66.01 SEVERE OBESITY (BMI 35.0-39.9) WITH COMORBIDITY (HCC): Status: ACTIVE | Noted: 2018-03-21

## 2018-08-10 ENCOUNTER — APPOINTMENT (OUTPATIENT)
Dept: CT IMAGING | Age: 53
End: 2018-08-10
Attending: EMERGENCY MEDICINE
Payer: MEDICARE

## 2018-08-10 ENCOUNTER — HOSPITAL ENCOUNTER (EMERGENCY)
Age: 53
Discharge: HOME | End: 2018-08-10
Attending: EMERGENCY MEDICINE
Payer: MEDICARE

## 2018-08-10 VITALS
HEIGHT: 66 IN | RESPIRATION RATE: 16 BRPM | DIASTOLIC BLOOD PRESSURE: 79 MMHG | BODY MASS INDEX: 33.75 KG/M2 | WEIGHT: 210 LBS | TEMPERATURE: 97.6 F | HEART RATE: 65 BPM | OXYGEN SATURATION: 96 % | SYSTOLIC BLOOD PRESSURE: 155 MMHG

## 2018-08-10 DIAGNOSIS — R51.9 FRONTAL HEADACHE: Primary | ICD-10-CM

## 2018-08-10 DIAGNOSIS — R53.83 FATIGUE, UNSPECIFIED TYPE: ICD-10-CM

## 2018-08-10 DIAGNOSIS — R03.0 ELEVATED BLOOD PRESSURE READING: ICD-10-CM

## 2018-08-10 LAB
ALBUMIN SERPL-MCNC: 3.6 G/DL (ref 3.5–5)
ALBUMIN/GLOB SERPL: 0.9 {RATIO} (ref 1.2–3.5)
ALP SERPL-CCNC: 49 U/L (ref 50–136)
ALT SERPL-CCNC: 32 U/L (ref 12–65)
ANION GAP SERPL CALC-SCNC: 6 MMOL/L (ref 7–16)
AST SERPL-CCNC: 29 U/L (ref 15–37)
ATRIAL RATE: 57 BPM
BASOPHILS # BLD: 0 K/UL
BASOPHILS NFR BLD: 1 % (ref 0–2)
BILIRUB SERPL-MCNC: 0.2 MG/DL (ref 0.2–1.1)
BUN SERPL-MCNC: 8 MG/DL (ref 6–23)
CALCIUM SERPL-MCNC: 8.6 MG/DL (ref 8.3–10.4)
CALCULATED P AXIS, ECG09: 69 DEGREES
CALCULATED R AXIS, ECG10: 69 DEGREES
CALCULATED T AXIS, ECG11: 89 DEGREES
CHLORIDE SERPL-SCNC: 106 MMOL/L (ref 98–107)
CO2 SERPL-SCNC: 29 MMOL/L (ref 21–32)
CREAT SERPL-MCNC: 0.73 MG/DL (ref 0.6–1)
DIAGNOSIS, 93000: NORMAL
DIFFERENTIAL METHOD BLD: NORMAL
EOSINOPHIL # BLD: 0.2 K/UL
EOSINOPHIL NFR BLD: 2 % (ref 0.5–7.8)
ERYTHROCYTE [DISTWIDTH] IN BLOOD BY AUTOMATED COUNT: 13.1 %
GLOBULIN SER CALC-MCNC: 3.9 G/DL (ref 2.3–3.5)
GLUCOSE SERPL-MCNC: 94 MG/DL (ref 65–100)
HCT VFR BLD AUTO: 42 % (ref 35.8–46.3)
HGB BLD-MCNC: 14 G/DL (ref 11.7–15.4)
IMM GRANULOCYTES # BLD: 0 K/UL
IMM GRANULOCYTES NFR BLD AUTO: 0 % (ref 0–5)
INR PPP: 1.5
LYMPHOCYTES # BLD: 2.4 K/UL
LYMPHOCYTES NFR BLD: 29 % (ref 13–44)
MCH RBC QN AUTO: 30.4 PG (ref 26.1–32.9)
MCHC RBC AUTO-ENTMCNC: 33.3 G/DL (ref 31.4–35)
MCV RBC AUTO: 91.1 FL (ref 79.6–97.8)
MONOCYTES # BLD: 0.6 K/UL
MONOCYTES NFR BLD: 8 % (ref 4–12)
NEUTS SEG # BLD: 4.8 K/UL
NEUTS SEG NFR BLD: 60 % (ref 43–78)
NRBC # BLD: 0 K/UL (ref 0–0.2)
P-R INTERVAL, ECG05: 164 MS
PLATELET # BLD AUTO: 252 K/UL (ref 150–450)
PMV BLD AUTO: 10.5 FL (ref 9.4–12.3)
POTASSIUM SERPL-SCNC: 4.1 MMOL/L (ref 3.5–5.1)
PROT SERPL-MCNC: 7.5 G/DL (ref 6.3–8.2)
PROTHROMBIN TIME: 18.9 SEC (ref 11.5–14.5)
Q-T INTERVAL, ECG07: 448 MS
QRS DURATION, ECG06: 92 MS
QTC CALCULATION (BEZET), ECG08: 436 MS
RBC # BLD AUTO: 4.61 M/UL (ref 4.05–5.2)
SODIUM SERPL-SCNC: 141 MMOL/L (ref 136–145)
TROPONIN I BLD-MCNC: 0 NG/ML (ref 0.02–0.05)
VENTRICULAR RATE, ECG03: 57 BPM
WBC # BLD AUTO: 8 K/UL (ref 4.3–11.1)

## 2018-08-10 PROCEDURE — 81003 URINALYSIS AUTO W/O SCOPE: CPT | Performed by: EMERGENCY MEDICINE

## 2018-08-10 PROCEDURE — 96374 THER/PROPH/DIAG INJ IV PUSH: CPT | Performed by: EMERGENCY MEDICINE

## 2018-08-10 PROCEDURE — 84484 ASSAY OF TROPONIN QUANT: CPT

## 2018-08-10 PROCEDURE — 85025 COMPLETE CBC W/AUTO DIFF WBC: CPT

## 2018-08-10 PROCEDURE — 70450 CT HEAD/BRAIN W/O DYE: CPT

## 2018-08-10 PROCEDURE — 96375 TX/PRO/DX INJ NEW DRUG ADDON: CPT | Performed by: EMERGENCY MEDICINE

## 2018-08-10 PROCEDURE — 99285 EMERGENCY DEPT VISIT HI MDM: CPT | Performed by: EMERGENCY MEDICINE

## 2018-08-10 PROCEDURE — 85610 PROTHROMBIN TIME: CPT

## 2018-08-10 PROCEDURE — 74011250636 HC RX REV CODE- 250/636: Performed by: EMERGENCY MEDICINE

## 2018-08-10 PROCEDURE — 93005 ELECTROCARDIOGRAM TRACING: CPT | Performed by: EMERGENCY MEDICINE

## 2018-08-10 PROCEDURE — 80053 COMPREHEN METABOLIC PANEL: CPT

## 2018-08-10 RX ORDER — BUTALBITAL, ACETAMINOPHEN AND CAFFEINE 300; 40; 50 MG/1; MG/1; MG/1
1 CAPSULE ORAL
Qty: 12 CAP | Refills: 0 | Status: SHIPPED | OUTPATIENT
Start: 2018-08-10

## 2018-08-10 RX ORDER — DIPHENHYDRAMINE HYDROCHLORIDE 50 MG/ML
25 INJECTION, SOLUTION INTRAMUSCULAR; INTRAVENOUS ONCE
Status: COMPLETED | OUTPATIENT
Start: 2018-08-10 | End: 2018-08-10

## 2018-08-10 RX ORDER — DEXAMETHASONE SODIUM PHOSPHATE 100 MG/10ML
10 INJECTION INTRAMUSCULAR; INTRAVENOUS
Status: COMPLETED | OUTPATIENT
Start: 2018-08-10 | End: 2018-08-10

## 2018-08-10 RX ORDER — METOCLOPRAMIDE HYDROCHLORIDE 5 MG/ML
10 INJECTION INTRAMUSCULAR; INTRAVENOUS
Status: COMPLETED | OUTPATIENT
Start: 2018-08-10 | End: 2018-08-10

## 2018-08-10 RX ADMIN — DEXAMETHASONE SODIUM PHOSPHATE 10 MG: 10 INJECTION INTRAMUSCULAR; INTRAVENOUS at 09:53

## 2018-08-10 RX ADMIN — METOCLOPRAMIDE 10 MG: 5 INJECTION, SOLUTION INTRAMUSCULAR; INTRAVENOUS at 09:50

## 2018-08-10 RX ADMIN — DIPHENHYDRAMINE HYDROCHLORIDE 25 MG: 50 INJECTION, SOLUTION INTRAMUSCULAR; INTRAVENOUS at 09:51

## 2018-08-10 NOTE — DISCHARGE INSTRUCTIONS
Continue your current medications. You're on the maximal dose already of amlodipine and Benicar. He can increase your Coreg to 25 mg twice a day (2 pills in the morning and night). Called your primary care doctor for appointment to recheck your blood pressure. .  Call the people that regulate your Coumadin to report your INR 1.8 and see if he needed an adjustment. Recheck sooner for worse headache/fever/vomiting/stiffneck/rash/passing out. Headache: Care Instructions  Your Care Instructions    Headaches have many possible causes. Most headaches aren't a sign of a more serious problem, and they will get better on their own. Home treatment may help you feel better faster. The doctor has checked you carefully, but problems can develop later. If you notice any problems or new symptoms, get medical treatment right away. Follow-up care is a key part of your treatment and safety. Be sure to make and go to all appointments, and call your doctor if you are having problems. It's also a good idea to know your test results and keep a list of the medicines you take. How can you care for yourself at home? · Do not drive if you have taken a prescription pain medicine. · Rest in a quiet, dark room until your headache is gone. Close your eyes and try to relax or go to sleep. Don't watch TV or read. · Put a cold, moist cloth or cold pack on the painful area for 10 to 20 minutes at a time. Put a thin cloth between the cold pack and your skin. · Use a warm, moist towel or a heating pad set on low to relax tight shoulder and neck muscles. · Have someone gently massage your neck and shoulders. · Take pain medicines exactly as directed. ¨ If the doctor gave you a prescription medicine for pain, take it as prescribed. ¨ If you are not taking a prescription pain medicine, ask your doctor if you can take an over-the-counter medicine.   · Be careful not to take pain medicine more often than the instructions allow, because you may get worse or more frequent headaches when the medicine wears off. · Do not ignore new symptoms that occur with a headache, such as a fever, weakness or numbness, vision changes, or confusion. These may be signs of a more serious problem. To prevent headaches  · Keep a headache diary so you can figure out what triggers your headaches. Avoiding triggers may help you prevent headaches. Record when each headache began, how long it lasted, and what the pain was like (throbbing, aching, stabbing, or dull). Write down any other symptoms you had with the headache, such as nausea, flashing lights or dark spots, or sensitivity to bright light or loud noise. Note if the headache occurred near your period. List anything that might have triggered the headache, such as certain foods (chocolate, cheese, wine) or odors, smoke, bright light, stress, or lack of sleep. · Find healthy ways to deal with stress. Headaches are most common during or right after stressful times. Take time to relax before and after you do something that has caused a headache in the past.  · Try to keep your muscles relaxed by keeping good posture. Check your jaw, face, neck, and shoulder muscles for tension, and try relaxing them. When sitting at a desk, change positions often, and stretch for 30 seconds each hour. · Get plenty of sleep and exercise. · Eat regularly and well. Long periods without food can trigger a headache. · Treat yourself to a massage. Some people find that regular massages are very helpful in relieving tension. · Limit caffeine by not drinking too much coffee, tea, or soda. But don't quit caffeine suddenly, because that can also give you headaches. · Reduce eyestrain from computers by blinking frequently and looking away from the computer screen every so often. Make sure you have proper eyewear and that your monitor is set up properly, about an arm's length away. · Seek help if you have depression or anxiety. Your headaches may be linked to these conditions. Treatment can both prevent headaches and help with symptoms of anxiety or depression. When should you call for help? Call 911 anytime you think you may need emergency care. For example, call if:    · You have signs of a stroke. These may include:  ¨ Sudden numbness, paralysis, or weakness in your face, arm, or leg, especially on only one side of your body. ¨ Sudden vision changes. ¨ Sudden trouble speaking. ¨ Sudden confusion or trouble understanding simple statements. ¨ Sudden problems with walking or balance. ¨ A sudden, severe headache that is different from past headaches.    Call your doctor now or seek immediate medical care if:    · You have a new or worse headache.     · Your headache gets much worse. Where can you learn more? Go to http://mitchell-karen.info/. Enter M271 in the search box to learn more about \"Headache: Care Instructions. \"  Current as of: October 9, 2017  Content Version: 11.7  © 9560-7218 [employer]. Care instructions adapted under license by [employer] (which disclaims liability or warranty for this information). If you have questions about a medical condition or this instruction, always ask your healthcare professional. [place] [address] any warranty or liability for your use of this information. Fatigue: Care Instructions  Your Care Instructions    Fatigue is a feeling of tiredness, exhaustion, or lack of energy. You may feel fatigue because of too much or not enough activity. It can also come from stress, lack of sleep, boredom, and poor diet. Many medical problems, such as viral infections, can cause fatigue. Emotional problems, especially depression, are often the cause of fatigue. Fatigue is most often a symptom of another problem. Treatment for fatigue depends on the cause.  For example, if you have fatigue because you have a certain health problem, treating this problem also treats your fatigue. If depression or anxiety is the cause, treatment may help. Follow-up care is a key part of your treatment and safety. Be sure to make and go to all appointments, and call your doctor if you are having problems. It's also a good idea to know your test results and keep a list of the medicines you take. How can you care for yourself at home? · Get regular exercise. But don't overdo it. Go back and forth between rest and exercise. · Get plenty of rest.  · Eat a healthy diet. Do not skip meals, especially breakfast.  · Reduce your use of caffeine, tobacco, and alcohol. Caffeine is most often found in coffee, tea, cola drinks, and chocolate. · Limit medicines that can cause fatigue. This includes tranquilizers and cold and allergy medicines. When should you call for help? Watch closely for changes in your health, and be sure to contact your doctor if:    · You have new symptoms such as fever or a rash.     · Your fatigue gets worse.     · You have been feeling down, depressed, or hopeless. Or you may have lost interest in things that you usually enjoy.     · You are not getting better as expected. Where can you learn more? Go to http://mitchell-karen.info/. Enter Z543 in the search box to learn more about \"Fatigue: Care Instructions. \"  Current as of: November 20, 2017  Content Version: 11.7  © 4796-6692 [employer]. Care instructions adapted under license by "[employer] (which disclaims liability or warranty for this information). If you have questions about a medical condition or this instruction, always ask your healthcare professional. [address] any warranty or liability for your use of this information. Elevated Blood Pressure: Care Instructions  Your Care Instructions    Blood pressure is a measure of how hard the blood pushes against the walls of your arteries.  It's normal for blood pressure to go up and down throughout the day. But if it stays up over time, you have high blood pressure. Two numbers tell you your blood pressure. The first number is the systolic pressure. It shows how hard the blood pushes when your heart is pumping. The second number is the diastolic pressure. It shows how hard the blood pushes between heartbeats, when your heart is relaxed and filling with blood. An ideal blood pressure in adults is less than 120/80 (say \"120 over 80\"). High blood pressure is 140/90 or higher. You have high blood pressure if your top number is 140 or higher or your bottom number is 90 or higher, or both. The main test for high blood pressure is simple, fast, and painless. To diagnose high blood pressure, your doctor will test your blood pressure at different times. After testing your blood pressure, your doctor may ask you to test it again when you are home. If you are diagnosed with high blood pressure, you can work with your doctor to make a long-term plan to manage it. Follow-up care is a key part of your treatment and safety. Be sure to make and go to all appointments, and call your doctor if you are having problems. It's also a good idea to know your test results and keep a list of the medicines you take. How can you care for yourself at home? · Do not smoke. Smoking increases your risk for heart attack and stroke. If you need help quitting, talk to your doctor about stop-smoking programs and medicines. These can increase your chances of quitting for good. · Stay at a healthy weight. · Try to limit how much sodium you eat to less than 2,300 milligrams (mg) a day. Your doctor may ask you to try to eat less than 1,500 mg a day. · Be physically active. Get at least 30 minutes of exercise on most days of the week. Walking is a good choice. You also may want to do other activities, such as running, swimming, cycling, or playing tennis or team sports. · Avoid or limit alcohol.  Talk to your doctor about whether you can drink any alcohol. · Eat plenty of fruits, vegetables, and low-fat dairy products. Eat less saturated and total fats. · Learn how to check your blood pressure at home. When should you call for help? Call your doctor now or seek immediate medical care if:  ? · Your blood pressure is much higher than normal (such as 180/110 or higher). ? · You think high blood pressure is causing symptoms such as:  ¨ Severe headache. ¨ Blurry vision. ? Watch closely for changes in your health, and be sure to contact your doctor if:  ? · You do not get better as expected. Where can you learn more? Go to http://mitchell-karen.info/. Enter H589 in the search box to learn more about \"Elevated Blood Pressure: Care Instructions. \"  Current as of: September 21, 2016  Content Version: 11.4  © 7112-7432 [employer]. Care instructions adapted under license by [employer] (which disclaims liability or warranty for this information). If you have questions about a medical condition or this instruction, always ask your healthcare professional. [address] any warranty or liability for your use of this information.

## 2018-08-10 NOTE — ED PROVIDER NOTES
HPI Comments: 45-year-old female has a history of hypertension, coronary artery disease with bypass grafting, peripheral vascular disease with left lower extremity obstruction, diffuse paresthesias in the care of neurologist.  She states fatigued for a few days. The rather sudden onset of frontal headache that is constant and somewhat worsening. Describes dizziness which is nonspecific and not necessarily vertigo. She has no focal numbness or weakness. There is no rash or syncope. Exposure. No fall or head trauma. She's had no fever or vomiting. No chest pain or shortness of breath. Perhaps old diarrhea. States her blood pressures under good control but she is getting some readings of 277-390 systolic likely. Her changes in medicines    Patient is a 50 y.o. female presenting with headaches and hypertension. The history is provided by the patient. Headache    This is a new problem. The current episode started 2 days ago. The problem occurs constantly. The problem has not changed since onset. The pain is located in the frontal region. The quality of the pain is described as dull. The pain is moderate. Associated symptoms include malaise/fatigue, tingling (chronic in all extremities), dizziness and nausea. Pertinent negatives include no fever, no palpitations, no syncope, no shortness of breath, no weakness, no visual change and no vomiting. She has tried acetaminophen for the symptoms. Hypertension    Associated symptoms include malaise/fatigue, headaches, dizziness and nausea. Pertinent negatives include no chest pain, no palpitations, no neck pain, no shortness of breath and no vomiting.         Past Medical History:   Diagnosis Date    Arthritis     CAD (coronary artery disease) CABG 2009    cabg 2009-- stent x 1--2015-- followed by dr. Kelsey Reddy Chronic back pain states cannot lie flat due to back pain    Chronic kidney disease     stents x 2--    Chronic obstructive pulmonary disease ([facility] daily inhaler---- no home O2    Chronic pain     Coagulation defect ([address]     DDD (degenerative disc disease), lumbar     Depression     Fibromyalgia     GERD (gastroesophageal reflux disease)     controlled with med    Hypercholesterolemia     Hypertension     controlled with med    Joint ache     Left leg pain     Liver disease     enlarged per pt    Memory impairment     MI, old x 2    2009    Narcolepsy     Obesity (BMI 30-39.9)     36.6    Persistent insomnia      PVD (peripheral vascular disease) ([address]     S/P CABG x 4     Stented coronary artery     EF 55-60% echo 12/15    Thyroid disease     hypo    Vertigo        Past Surgical History:   Procedure Laterality Date    HX  SECTION      x3    HX CHOLECYSTECTOMY      HX CORONARY ARTERY BYPASS GRAFT  2009    x 4 grafts    HX CORONARY STENT PLACEMENT  2015    x1 spring    HX HYSTERECTOMY      HX ORTHOPAEDIC      left knee arthroscopy     HX OTHER SURGICAL  2010    renal stents     HX SALPINGO-OOPHORECTOMY      VASCULAR SURGERY PROCEDURE UNLIST Left 01/15/2018    arteriogram fem and pop angio w/stenting    VASCULAR SURGERY PROCEDURE UNLIST Left 2018    arteriogram, fem pop thromectomy    VASCULAR SURGERY PROCEDURE UNLIST Left 2018    f/u lysis    VASCULAR SURGERY PROCEDURE UNLIST Left 2018    arteriogam         Family History:   Problem Relation Age of Onset    Diabetes Mother     Heart Disease Mother     Kidney Disease Mother     Lung Disease Mother     Diabetes Father     Heart Disease Father     Kidney Disease Father     Lung Disease Father        Social History     Social History    Marital status: SINGLE     Spouse name: N/A    Number of children: N/A    Years of education: N/A     Occupational History    Not on file.      Social History Main Topics    Smoking status: Former Smoker     Packs/day: 1.00     Years: 36.00     Types: Cigarettes     Quit date: 10/9/2016    Smokeless tobacco: Never Used    Alcohol use No      Comment:      Drug use: No    Sexual activity: Not on file     Other Topics Concern    Not on file     Social History Narrative         ALLERGIES: Review of patient's allergies indicates no known allergies. Review of Systems   Constitutional: Positive for malaise/fatigue. Negative for chills and fever. HENT: Negative for ear pain, sinus pressure and sore throat. Eyes: Negative for pain and visual disturbance. Respiratory: Negative for cough and shortness of breath. Cardiovascular: Negative for chest pain, palpitations and syncope. Gastrointestinal: Positive for nausea. Negative for vomiting. Musculoskeletal: Negative for back pain, gait problem, myalgias, neck pain and neck stiffness. Skin: Negative for color change and rash. Neurological: Positive for dizziness, tingling (chronic in all extremities) and headaches. Negative for syncope, weakness and numbness. All other systems reviewed and are negative. Vitals:    08/10/18 0912 08/10/18 0921   BP: 160/89    Pulse: 69    Resp: (!) 56    Temp: 98.2 °F (36.8 °C)    SpO2: 96% 96%   Weight: 95.3 kg (210 lb)    Height: 5' 6\" (1.676 m)             Physical Exam   Constitutional: She is oriented to person, place, and time. She appears well-developed and well-nourished. No distress. HENT:   Head: Normocephalic and atraumatic. Right Ear: External ear normal.   Left Ear: External ear normal.   Mouth/Throat: Oropharynx is clear and moist. No oropharyngeal exudate. Eyes: Conjunctivae and EOM are normal. Pupils are equal, round, and reactive to light. Neck: Normal range of motion. Neck supple. Cardiovascular: Normal rate, regular rhythm and intact distal pulses. No murmur heard. Pulmonary/Chest: Breath sounds normal. No respiratory distress. Abdominal: No hernia. Neurological: She is alert and oriented to person, place, and time. Gait normal. GCS eye subscore is 4.  GCS verbal subscore is 5. GCS motor subscore is 6. Nl speech  No focal deficits   Skin: Skin is warm and dry. Psychiatric: She has a normal mood and affect. Her speech is normal.   Nursing note and vitals reviewed. MDM  Number of Diagnoses or Management Options  Diagnosis management comments: Due to the fact patient has progressive headache and is on Coumadin, obtain head CT. Rest of examination essentially unremarkable. Check for anemia, dehydration, electrolyte abnormalities. Check EKG. Blood pressure at this time very reasonable. No treatment       Amount and/or Complexity of Data Reviewed  Clinical lab tests: ordered and reviewed  Tests in the radiology section of CPT®: ordered and reviewed  Tests in the medicine section of CPT®: ordered and reviewed  Review and summarize past medical records: yes (Reviewed primary care. Also reviewed a history of left lower extremity surgery for ROM arterial thrombosis.)    Risk of Complications, Morbidity, and/or Mortality  Presenting problems: moderate  Diagnostic procedures: low  Management options: moderate          ED Course       Procedures      Results Include:    Recent Results (from the past 24 hour(s))   CBC WITH AUTOMATED DIFF    Collection Time: 08/10/18  9:38 AM   Result Value Ref Range    WBC 8.0 4.3 - 11.1 K/uL    RBC 4.61 4.05 - 5.2 M/uL    HGB 14.0 11.7 - 15.4 g/dL    HCT 42.0 35.8 - 46.3 %    MCV 91.1 79.6 - 97.8 FL    MCH 30.4 26.1 - 32.9 PG    MCHC 33.3 31.4 - 35.0 g/dL    RDW 13.1 %    PLATELET 752 790 - 046 K/uL    MPV 10.5 9.4 - 12.3 FL    ABSOLUTE NRBC 0.00 0.0 - 0.2 K/uL    DF AUTOMATED      NEUTROPHILS 60 43 - 78 %    LYMPHOCYTES 29 13 - 44 %    MONOCYTES 8 4.0 - 12.0 %    EOSINOPHILS 2 0.5 - 7.8 %    BASOPHILS 1 0.0 - 2.0 %    IMMATURE GRANULOCYTES 0 0.0 - 5.0 %    ABS. NEUTROPHILS 4.8 K/UL    ABS. LYMPHOCYTES 2.4 K/UL    ABS. MONOCYTES 0.6 K/UL    ABS. EOSINOPHILS 0.2 K/UL    ABS. BASOPHILS 0.0 K/UL    ABS. IMM.  GRANS. 0.0 K/UL   METABOLIC PANEL, COMPREHENSIVE    Collection Time: 08/10/18  9:38 AM   Result Value Ref Range    Sodium 141 136 - 145 mmol/L    Potassium 4.1 3.5 - 5.1 mmol/L    Chloride 106 98 - 107 mmol/L    CO2 29 21 - 32 mmol/L    Anion gap 6 (L) 7 - 16 mmol/L    Glucose 94 65 - 100 mg/dL    BUN 8 6 - 23 MG/DL    Creatinine 0.73 0.6 - 1.0 MG/DL    GFR est AA >60 >60 ml/min/1.73m2    GFR est non-AA >60 >60 ml/min/1.73m2    Calcium 8.6 8.3 - 10.4 MG/DL    Bilirubin, total 0.2 0.2 - 1.1 MG/DL    ALT (SGPT) 32 12 - 65 U/L    AST (SGOT) 29 15 - 37 U/L    Alk. phosphatase 49 (L) 50 - 136 U/L    Protein, total 7.5 6.3 - 8.2 g/dL    Albumin 3.6 3.5 - 5.0 g/dL    Globulin 3.9 (H) 2.3 - 3.5 g/dL    A-G Ratio 0.9 (L) 1.2 - 3.5     PROTHROMBIN TIME + INR    Collection Time: 08/10/18  9:38 AM   Result Value Ref Range    Prothrombin time 18.9 (H) 11.5 - 14.5 sec    INR 1.5     POC TROPONIN-I    Collection Time: 08/10/18  9:40 AM   Result Value Ref Range    Troponin-I (POC) 0 (L) 0.02 - 0.05 ng/ml     Ct Head Wo Cont    Result Date: 8/10/2018  Examination: CT scan of the brain without contrast. History: Headache, positional, 52 years Female Technique: 5 mm axial imaging of the brain from the posterior fossa to the vertex. Radiation dose reduction techniques were used for this study:  Our CT scanners use one or all of the following: Automated exposure control, adjustment of the mA and/or kVp according to patient's size, iterative reconstruction. Comparison:  CT brain January 02, 2016 Findings: Small chronic lacunar infarct in the left thalamus unchanged. The ventricles, sulci are age-appropriate. No intracranial hemorrhage or extra-axial collection is identified. No evidence of acute infarct. No mass effect or midline shift is present. Basal cisterns are intact. The visualized paranasal sinuses and mastoid air cells are clear. The orbits, bones, and soft tissues are normal in appearance. IMPRESSION:  No acute intracranial abnormality.   Other chronic findings as above. 11:13 AM  Headache improving.   Systolic blood pressure about 180

## 2018-08-10 NOTE — ED NOTES
I have reviewed discharge instructions with the patient. The patient verbalized understanding. Patient left ED via Discharge Method: ambulatory to Home with family. Opportunity for questions and clarification provided. Patient given 1 scripts. To continue your aftercare when you leave the hospital, you may receive an automated call from our care team to check in on how you are doing. This is a free service and part of our promise to provide the best care and service to meet your aftercare needs.  If you have questions, or wish to unsubscribe from this service please call [phone]. Thank you for Choosing our [facility].

## 2018-08-10 NOTE — ED TRIAGE NOTES
Pt to ED c/o \"not feeling right. \" Headache for 3 days. No known trauma. Pt on coumadin for clotting disorder. Uses a cane since December surgeries. Pt states hypertension issues as well. No slurred speech noted. States tingling in feet.

## 2018-09-22 ENCOUNTER — HOSPITAL ENCOUNTER (EMERGENCY)
Age: 53
Discharge: HOME | End: 2018-09-22
Attending: EMERGENCY MEDICINE
Payer: MEDICARE

## 2018-09-22 ENCOUNTER — APPOINTMENT (OUTPATIENT)
Dept: CT IMAGING | Age: 53
End: 2018-09-22
Attending: EMERGENCY MEDICINE
Payer: MEDICARE

## 2018-09-22 VITALS
BODY MASS INDEX: 36.96 KG/M2 | TEMPERATURE: 98.4 F | HEIGHT: 66 IN | WEIGHT: 230 LBS | RESPIRATION RATE: 16 BRPM | HEART RATE: 75 BPM | SYSTOLIC BLOOD PRESSURE: 149 MMHG | OXYGEN SATURATION: 96 % | DIASTOLIC BLOOD PRESSURE: 84 MMHG

## 2018-09-22 DIAGNOSIS — R10.31 ABDOMINAL PAIN, RIGHT LOWER QUADRANT: Primary | ICD-10-CM

## 2018-09-22 LAB
ALBUMIN SERPL-MCNC: 3.6 G/DL (ref 3.5–5)
ALBUMIN/GLOB SERPL: 0.8 {RATIO}
ALP SERPL-CCNC: 58 U/L (ref 50–136)
ALT SERPL-CCNC: 46 U/L (ref 12–65)
ANION GAP SERPL CALC-SCNC: 8 MMOL/L
AST SERPL-CCNC: 37 U/L (ref 15–37)
BASOPHILS # BLD: 0.1 K/UL (ref 0–0.2)
BASOPHILS NFR BLD: 1 % (ref 0–2)
BILIRUB SERPL-MCNC: 0.2 MG/DL (ref 0.2–1.1)
BUN SERPL-MCNC: 14 MG/DL (ref 6–23)
CALCIUM SERPL-MCNC: 8.8 MG/DL (ref 8.3–10.4)
CHLORIDE SERPL-SCNC: 101 MMOL/L (ref 98–107)
CO2 SERPL-SCNC: 28 MMOL/L (ref 21–32)
CREAT SERPL-MCNC: 0.97 MG/DL (ref 0.6–1)
DIFFERENTIAL METHOD BLD: ABNORMAL
EOSINOPHIL # BLD: 0.2 K/UL (ref 0–0.8)
EOSINOPHIL NFR BLD: 2 % (ref 0.5–7.8)
ERYTHROCYTE [DISTWIDTH] IN BLOOD BY AUTOMATED COUNT: 13.4 %
GLOBULIN SER CALC-MCNC: 4.7 G/DL (ref 2.3–3.5)
GLUCOSE SERPL-MCNC: 122 MG/DL (ref 65–100)
HCT VFR BLD AUTO: 38.8 % (ref 35.8–46.3)
HGB BLD-MCNC: 12.6 G/DL (ref 11.7–15.4)
IMM GRANULOCYTES # BLD: 0.2 K/UL (ref 0–0.5)
IMM GRANULOCYTES NFR BLD AUTO: 1 % (ref 0–5)
LIPASE SERPL-CCNC: 148 U/L (ref 73–393)
LYMPHOCYTES # BLD: 3.3 K/UL (ref 0.5–4.6)
LYMPHOCYTES NFR BLD: 24 % (ref 13–44)
MCH RBC QN AUTO: 29.6 PG (ref 26.1–32.9)
MCHC RBC AUTO-ENTMCNC: 32.5 G/DL (ref 31.4–35)
MCV RBC AUTO: 91.3 FL (ref 79.6–97.8)
MONOCYTES # BLD: 1.2 K/UL (ref 0.1–1.3)
MONOCYTES NFR BLD: 9 % (ref 4–12)
NEUTS SEG # BLD: 8.9 K/UL (ref 1.7–8.2)
NEUTS SEG NFR BLD: 64 % (ref 43–78)
NRBC # BLD: 0 K/UL (ref 0–0.2)
PLATELET # BLD AUTO: 368 K/UL (ref 150–450)
PMV BLD AUTO: 10.6 FL (ref 9.4–12.3)
POTASSIUM SERPL-SCNC: 3.6 MMOL/L (ref 3.5–5.1)
PROT SERPL-MCNC: 8.3 G/DL
RBC # BLD AUTO: 4.25 M/UL (ref 4.05–5.2)
SODIUM SERPL-SCNC: 137 MMOL/L (ref 136–145)
WBC # BLD AUTO: 13.9 K/UL (ref 4.3–11.1)

## 2018-09-22 PROCEDURE — 81003 URINALYSIS AUTO W/O SCOPE: CPT | Performed by: EMERGENCY MEDICINE

## 2018-09-22 PROCEDURE — 83690 ASSAY OF LIPASE: CPT

## 2018-09-22 PROCEDURE — 74011250636 HC RX REV CODE- 250/636: Performed by: EMERGENCY MEDICINE

## 2018-09-22 PROCEDURE — 74011636320 HC RX REV CODE- 636/320: Performed by: EMERGENCY MEDICINE

## 2018-09-22 PROCEDURE — 96361 HYDRATE IV INFUSION ADD-ON: CPT | Performed by: EMERGENCY MEDICINE

## 2018-09-22 PROCEDURE — 74011000258 HC RX REV CODE- 258: Performed by: EMERGENCY MEDICINE

## 2018-09-22 PROCEDURE — 99284 EMERGENCY DEPT VISIT MOD MDM: CPT | Performed by: EMERGENCY MEDICINE

## 2018-09-22 PROCEDURE — 74177 CT ABD & PELVIS W/CONTRAST: CPT

## 2018-09-22 PROCEDURE — 85025 COMPLETE CBC W/AUTO DIFF WBC: CPT

## 2018-09-22 PROCEDURE — 80053 COMPREHEN METABOLIC PANEL: CPT

## 2018-09-22 PROCEDURE — 96375 TX/PRO/DX INJ NEW DRUG ADDON: CPT | Performed by: EMERGENCY MEDICINE

## 2018-09-22 PROCEDURE — 96374 THER/PROPH/DIAG INJ IV PUSH: CPT | Performed by: EMERGENCY MEDICINE

## 2018-09-22 RX ORDER — MORPHINE SULFATE 10 MG/ML
4 INJECTION, SOLUTION INTRAMUSCULAR; INTRAVENOUS
Status: COMPLETED | OUTPATIENT
Start: 2018-09-22 | End: 2018-09-22

## 2018-09-22 RX ORDER — ONDANSETRON 2 MG/ML
4 INJECTION INTRAMUSCULAR; INTRAVENOUS
Status: COMPLETED | OUTPATIENT
Start: 2018-09-22 | End: 2018-09-22

## 2018-09-22 RX ORDER — HYOSCYAMINE SULFATE 0.12 MG/1
0.12 TABLET SUBLINGUAL
Qty: 12 TAB | Refills: 0 | Status: SHIPPED | OUTPATIENT
Start: 2018-09-22

## 2018-09-22 RX ORDER — SODIUM CHLORIDE 0.9 % (FLUSH) 0.9 %
10 SYRINGE (ML) INJECTION
Status: COMPLETED | OUTPATIENT
Start: 2018-09-22 | End: 2018-09-22

## 2018-09-22 RX ADMIN — SODIUM CHLORIDE 1000 ML: 900 INJECTION, SOLUTION INTRAVENOUS at 00:50

## 2018-09-22 RX ADMIN — SODIUM CHLORIDE 100 ML: 900 INJECTION, SOLUTION INTRAVENOUS at 02:07

## 2018-09-22 RX ADMIN — Medication 10 ML: at 02:07

## 2018-09-22 RX ADMIN — IOPAMIDOL 100 ML: 755 INJECTION, SOLUTION INTRAVENOUS at 02:07

## 2018-09-22 RX ADMIN — ONDANSETRON 4 MG: 2 INJECTION INTRAMUSCULAR; INTRAVENOUS at 00:51

## 2018-09-22 RX ADMIN — MORPHINE SULFATE 4 MG: 10 INJECTION INTRAVENOUS at 00:51

## 2018-09-22 NOTE — ED NOTES
I have reviewed discharge instructions with the patient. The patient verbalized understanding. Patient left ED via Discharge Method: wheelchair to Home with (friend, self). Opportunity for questions and clarification provided. Patient given 1 scripts. To continue your aftercare when you leave the hospital, you may receive an automated call from our care team to check in on how you are doing. This is a free service and part of our promise to provide the best care and service to meet your aftercare needs.  If you have questions, or wish to unsubscribe from this service please call [phone]. Thank you for Choosing our [facility].

## 2018-09-22 NOTE — ED PROVIDER NOTES
HPI Comments: 82-year-old white female presents with right upper quadrant pain for the past 2 days. Pain worsened approximately 3 hours prior to arrival.  She has been able to eat in spite of the pain over the past 2 days. She has had some mild nausea but no vomiting, fever, diarrhea or urinary changes. No aggravating or alleviating factors for the pain. She is status post cholecystectomy approximately 20 years ago. Patient is a 48 y.o. female presenting with abdominal pain. The history is provided by the patient. Abdominal Pain Associated symptoms include nausea. Pertinent negatives include no fever, no diarrhea, no vomiting, no constipation, no dysuria, no headaches, no chest pain and no back pain. Past Medical History:  
Diagnosis Date  Arthritis  CAD (coronary artery disease) CABG   
 cabg -- stent x 1--2015-- followed by dr. Grace Conley  Chronic back pain states cannot lie flat due to back pain  Chronic kidney disease   
 stents x 2--  
 Chronic obstructive pulmonary disease (HCC) daily inhaler---- no home O2  Chronic pain  Coagulation defect ([address]  DDD (degenerative disc disease), lumbar  Depression  Fibromyalgia  GERD (gastroesophageal reflux disease)   
 controlled with med  Hypercholesterolemia  Hypertension   
 controlled with med  Joint ache  Left leg pain  Liver disease   
 enlarged per pt  Memory impairment  MI, old x 2  
   Narcolepsy  Obesity (BMI 30-39.9)   
 36.6  Persistent insomnia  PVD (peripheral vascular disease) ([address]  S/P CABG x 4   Stented coronary artery EF 55-60% echo 12/15  Thyroid disease   
 hypo  Vertigo Past Surgical History:  
Procedure Laterality Date  HX  SECTION    
 x3  
 HX CHOLECYSTECTOMY  HX CORONARY ARTERY BYPASS GRAFT  2009  
 x 4 grafts  HX CORONARY STENT PLACEMENT  2015  
 x1 spring  HX HYSTERECTOMY  HX ORTHOPAEDIC  06/24  
 left knee arthroscopy  HX OTHER SURGICAL  2010  
 renal stents  HX SALPINGO-OOPHORECTOMY  VASCULAR SURGERY PROCEDURE UNLIST Left 01/15/2018  
 arteriogram fem and pop angio w/stenting  VASCULAR SURGERY PROCEDURE UNLIST Left 01/16/2018  
 arteriogram, fem pop thromectomy  VASCULAR SURGERY PROCEDURE UNLIST Left 01/17/2018  
 f/u lysis  VASCULAR SURGERY PROCEDURE UNLIST Left 03/02/2018  
 arteriogam  
 
   
Family History:  
Problem Relation Age of Onset  Diabetes Mother  Heart Disease Mother  Kidney Disease Mother  Lung Disease Mother  Diabetes Father  Heart Disease Father  Kidney Disease Father  Lung Disease Father Social History Social History  Marital status: SINGLE Spouse name: N/A  
 Number of children: N/A  
 Years of education: N/A Occupational History  Not on file. Social History Main Topics  Smoking status: Former Smoker Packs/day: 1.00 Years: 36.00 Types: Cigarettes Quit date: 10/9/2015  Smokeless tobacco: Never Used  Alcohol use No  
   Comment:  Drug use: No  
 Sexual activity: Not on file Other Topics Concern  Not on file Social History Narrative ALLERGIES: Review of patient's allergies indicates no known allergies. Review of Systems Constitutional: Negative for fever. HENT: Negative for congestion. Respiratory: Negative for cough and shortness of breath. Cardiovascular: Negative for chest pain. Gastrointestinal: Positive for abdominal pain and nausea. Negative for constipation, diarrhea and vomiting. Genitourinary: Negative for dysuria. Musculoskeletal: Negative for back pain and neck pain. Skin: Negative for rash. Neurological: Negative for headaches. Vitals:  
 09/22/18 0020 BP: 155/84 Pulse: 83 Resp: 17 Temp: 98.4 °F (36.9 °C) SpO2: 95% Weight: 104.3 kg (230 lb) Height: 5' 6\" (1.676 m) Physical Exam  
Constitutional: She is oriented to person, place, and time. She appears well-developed and well-nourished. No distress. HENT:  
Head: Normocephalic and atraumatic. Mouth/Throat: Oropharynx is clear and moist.  
Eyes: Conjunctivae are normal. Pupils are equal, round, and reactive to light. No scleral icterus. Neck: Normal range of motion. Neck supple. Cardiovascular: Normal rate and regular rhythm. No murmur heard. Pulmonary/Chest: Effort normal and breath sounds normal.  
Abdominal: Soft. She exhibits no distension. There is no tenderness. Musculoskeletal: Normal range of motion. She exhibits no edema. Neurological: She is alert and oriented to person, place, and time. Skin: Skin is warm and dry. Psychiatric: She has a normal mood and affect. Nursing note and vitals reviewed. MDM Number of Diagnoses or Management Options Diagnosis management comments: Blood work unremarkable except for mild leukocytosis. Urinalysis normal.  CT abdomen and pelvis obtained secondary to right-sided abdominal pain with leukocytosis, shows a normal appendix and no acute abnormality. Patient appears safe for discharge home. We'll treat her pain with Levsin. Amount and/or Complexity of Data Reviewed Clinical lab tests: ordered and reviewed Tests in the radiology section of CPT®: ordered and reviewed Tests in the medicine section of CPT®: ordered and reviewed Independent visualization of images, tracings, or specimens: yes Risk of Complications, Morbidity, and/or Mortality Presenting problems: moderate Diagnostic procedures: moderate Management options: moderate ED Course Procedures

## 2018-09-22 NOTE — ED TRIAGE NOTES
Pt presents drinking a soft drink complaining of right side abd pain with N/V/D. Denies trouble with urination. Reports vascular surgery on her left leg on Tuesday at [facility] and states she is a kidney pt.

## 2018-09-22 NOTE — DISCHARGE INSTRUCTIONS

## 2020-06-23 NOTE — ED PROVIDER NOTES
HPI Comments: Patient presents to emergency with complaints of a achy type pain to the left sternal border that does not radiate the pain started last night while at rest is intermittent lasting a few minutes at a time. It did wake her up intermittently over the night as well. The patient has a history of coronary disease and has had a CABG performed in the past she is followed by [facility]. 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 The patient is currently on Zarrella 2. She reports a persistent right scapular pain for the past 2 weeks as well and persistent lower extremity pain since having the procedure. She denies any nausea or vomiting she denies any shortness of breath review of systems otherwise negative    Patient is a 50 y.o. female presenting with chest pain. The history is provided by the patient. Chest Pain    This is a new problem. The current episode started 12 to 24 hours ago. The problem has not changed since onset. Episode Length: \"a few minutes\" The problem occurs constantly. The pain is associated with rest. Pain location: left sternal border. The pain is at a severity of 9/10. The patient is experiencing no pain. Quality: aching. The pain does not radiate. Exacerbated by: nothing. Pertinent negatives include no abdominal pain, no back pain, no claudication, no cough, no diaphoresis, no dizziness, no exertional chest pressure, no fever, no headaches, no hemoptysis, no irregular heartbeat, no leg pain, no lower extremity edema, no malaise/fatigue, no nausea, no near-syncope, no numbness, no orthopnea, no palpitations, no PND, no shortness of breath, no sputum production, no vomiting and no weakness. She has tried nitroglycerin for the symptoms. The treatment provided mild relief. Risk factors include cardiac disease and hypertension. Her past medical history is significant for HTN. Her past medical Rx sent   history does not include aneurysm, cancer, DM, DVT, PE or CHF. Procedural history includes cardiac catheterization and CABG.        Past Medical History:   Diagnosis Date    Arthritis     CAD (coronary artery disease) CABG     cabg -- stent x 1---- followed by dr. Bishnu [place] Chronic back pain states cannot lie flat due to back pain    Chronic kidney disease     stents x 2--    Chronic obstructive pulmonary disease (HCC)     daily inhaler---- no home O2    Chronic pain     Coagulation defect ([address]     DDD (degenerative disc disease), lumbar     Depression     Fibromyalgia     GERD (gastroesophageal reflux disease)     controlled with med    Hypercholesterolemia     Hypertension     controlled with med    Joint ache     Left leg pain     Liver disease     enlarged per pt    Memory impairment     MI, old x 2        Narcolepsy     Obesity (BMI 30-39.9)     36.6    Persistent insomnia      PVD (peripheral vascular disease) ([address]     S/P CABG x 4     Stented coronary artery     EF 55-60% echo 12/15    Thyroid disease     hypo    Vertigo        Past Surgical History:   Procedure Laterality Date    HX  SECTION      x3    HX CHOLECYSTECTOMY      HX CORONARY ARTERY BYPASS GRAFT  2009    x 4 grafts    HX CORONARY STENT PLACEMENT  2015    x1 spring    HX HYSTERECTOMY      HX ORTHOPAEDIC      left knee arthroscopy     HX OTHER SURGICAL  2010    renal stents     HX SALPINGO-OOPHORECTOMY      VASCULAR SURGERY PROCEDURE UNLIST Left 01/15/2018    arteriogram fem and pop angio w/stenting         Family History:   Problem Relation Age of Onset    Diabetes Mother     Heart Disease Mother     Kidney Disease Mother     Lung Disease Mother     Diabetes Father     Heart Disease Father     Kidney Disease Father     Lung Disease Father        Social History     Social History    Marital status: SINGLE     Spouse name: N/A    Number of children: N/A    Years of education: N/A     Occupational History    Not on file. Social History Main Topics    Smoking status: Former Smoker     Packs/day: 1.00     Years: 36.00     Types: Cigarettes     Quit date: 10/9/2016    Smokeless tobacco: Never Used    Alcohol use No      Comment:      Drug use: No    Sexual activity: Not on file     Other Topics Concern    Not on file     Social History Narrative         ALLERGIES: Review of patient's allergies indicates no known allergies. Review of Systems   Constitutional: Negative for diaphoresis, fever and malaise/fatigue. Respiratory: Negative for cough, hemoptysis, sputum production and shortness of breath. Cardiovascular: Positive for chest pain. Negative for palpitations, orthopnea, claudication, PND and near-syncope. Gastrointestinal: Negative for abdominal pain, nausea and vomiting. Musculoskeletal: Negative for back pain. Neurological: Negative for dizziness, weakness, numbness and headaches. All other systems reviewed and are negative. Vitals:    01/26/18 0939   BP: 146/88   Pulse: 76   Resp: 18   Temp: 98.7 °F (37.1 °C)   SpO2: 98%   Weight: 104.3 kg (230 lb)   Height: 5' 7\" (1.702 m)            Physical Exam   Constitutional: She is oriented to person, place, and time. She appears well-developed and well-nourished. No distress. HENT:   Head: Normocephalic and atraumatic. Eyes: Conjunctivae and EOM are normal. Pupils are equal, round, and reactive to light. Neck: Normal range of motion. Neck supple. Cardiovascular: Normal rate, regular rhythm and normal heart sounds. Pulmonary/Chest: Effort normal and breath sounds normal.   Abdominal: Soft. Bowel sounds are normal.   Musculoskeletal: Normal range of motion. She exhibits no edema, tenderness or deformity. Neurological: She is alert and oriented to person, place, and time. Skin: Skin is warm and dry. Psychiatric: She has a normal mood and affect.  Her behavior is normal.   Nursing note and vitals reviewed. MDM  Number of Diagnoses or Management Options     Amount and/or Complexity of Data Reviewed  Clinical lab tests: ordered and reviewed  Tests in the radiology section of CPT®: ordered and reviewed  Independent visualization of images, tracings, or specimens: yes    Risk of Complications, Morbidity, and/or Mortality  Presenting problems: moderate  Diagnostic procedures: moderate  Management options: moderate    Patient Progress  Patient progress: stable    ED Course       Procedures    EKG at 9:37 AM: Normal sinus rhythm rate of 75 biatrial enlargement and nonspecific ST abnormalities but no acute ischemic changes are noted         Cardiac component evaluation are unremarkable. The patient does have a significant elevation of the CRP suggesting inflammatory process. The patient being on anticoagulants however steroidal and nonsteroidal anti-inflammatories are relatively controlled.   At this time patient was recommended to use ice and other analgesics as needed and follow-up with her primary care

## 2021-01-01 NOTE — ANESTHESIA PREPROCEDURE EVALUATION
Anesthetic History               Review of Systems / Medical History  Patient summary reviewed, nursing notes reviewed and pertinent labs reviewed    Pulmonary    COPD: mild      Undiagnosed apnea and smoker         Neuro/Psych         Psychiatric history     Cardiovascular    Hypertension: well controlled          Past MI (2009), CAD, PAD, cardiac stents (4/15) and CABG (2009)    Exercise tolerance: <4 METS  Comments: Stent in 4/2015   GI/Hepatic/Renal     GERD: well controlled           Endo/Other      Hypothyroidism: well controlled  Obesity and arthritis     Other Findings            Physical Exam    Airway  Mallampati: II  TM Distance: 4 - 6 cm  Neck ROM: normal range of motion   Mouth opening: Normal     Cardiovascular    Rhythm: regular  Rate: normal         Dental    Dentition: Full upper dentures, Full lower dentures and Edentulous     Pulmonary  Breath sounds clear to auscultation               Abdominal  GI exam deferred       Other Findings            Anesthetic Plan    ASA: 3  Anesthesia type: total IV anesthesia          Induction: Intravenous  Anesthetic plan and risks discussed with: Patient      Lysis performed yesterday. No change. Patient NPO.   Plan for TIVA LMTCB   Mychart message sent as well   Please relay below message when call is returned   Vijay Pinto CMA on 2021 at 9:44 AM

## 2022-06-21 NOTE — PROGRESS NOTES
Patient status post follow arteriogram.  Left SFA and popliteal artery is anterior tibial and peroneal direct runoff to the foot, left posterior tibial artery still with some thrombus distally. Patient has palpable pulses. Patient will lay flat for 3 hours until 1130. At that time will check bandages if no hematoma patient can sit up. Will switch patient to therapeutic Lovenox and Coumadin bridge and will DC Xarelto. Discussed with patient and  in detail the severity of patient's hypercoagulable / PVD disease as she is thrombosed recent stents by Dr. Doug Rosales twice in 2 months. Also stressed to the patient about tobacco abuse in the relationship and has with poor outcomes in these settings.     Jessica Beatty Capillary refill less/equal to 2 seconds

## 2023-03-03 NOTE — PROGRESS NOTES
Patient status post catheter check thrombolyzes for left lower extremity ischemia. Arteriogram shows patent SFA and popliteal artery stents with patent runoff down to the foot do anterior tibial and posterior tibial.  Patient still has some mild thrombus in SFA and popliteal artery will continue thrombolyzes overnight. Patient can have a diet. Will be n.p.o. after midnight. 1 mg of TPA continuously through the catheter and 500 units of heparin through the sheath. I will plan to discontinue that in the operating room tomorrow morning. Patient will get every 6 coags including fibrinogen starting at 2:00.     Lesli Aj The patient is a 33y Female complaining of

## 2024-12-16 NOTE — ED NOTES
Walked with pt around ER and O2 sat stayed at 95% and HR at 118. Dr. Lorenza Vo notified. Paresthesias

## (undated) DEVICE — TRAY CATH 16F URIN MTR LTX -- CONVERT TO ITEM 363111